# Patient Record
Sex: MALE | Race: BLACK OR AFRICAN AMERICAN | URBAN - METROPOLITAN AREA
[De-identification: names, ages, dates, MRNs, and addresses within clinical notes are randomized per-mention and may not be internally consistent; named-entity substitution may affect disease eponyms.]

---

## 2017-01-18 ENCOUNTER — OFFICE VISIT (OUTPATIENT)
Dept: INTERNAL MEDICINE CLINIC | Age: 52
End: 2017-01-18

## 2017-01-18 VITALS
DIASTOLIC BLOOD PRESSURE: 71 MMHG | SYSTOLIC BLOOD PRESSURE: 128 MMHG | WEIGHT: 176 LBS | HEIGHT: 67 IN | TEMPERATURE: 98.3 F | RESPIRATION RATE: 16 BRPM | OXYGEN SATURATION: 99 % | HEART RATE: 77 BPM | BODY MASS INDEX: 27.62 KG/M2

## 2017-01-18 DIAGNOSIS — I10 ESSENTIAL HYPERTENSION WITH GOAL BLOOD PRESSURE LESS THAN 140/90: Primary | ICD-10-CM

## 2017-01-18 DIAGNOSIS — R00.2 PALPITATIONS: ICD-10-CM

## 2017-01-18 DIAGNOSIS — Z13.1 SCREENING FOR DIABETES MELLITUS (DM): ICD-10-CM

## 2017-01-18 DIAGNOSIS — E78.2 MIXED HYPERLIPIDEMIA: ICD-10-CM

## 2017-01-18 DIAGNOSIS — N52.9 ERECTILE DYSFUNCTION, UNSPECIFIED ERECTILE DYSFUNCTION TYPE: ICD-10-CM

## 2017-01-18 RX ORDER — SILDENAFIL 50 MG/1
TABLET, FILM COATED ORAL
Qty: 4 TAB | Refills: 0 | Status: SHIPPED | OUTPATIENT
Start: 2017-01-18 | End: 2018-03-20 | Stop reason: SDUPTHER

## 2017-01-18 RX ORDER — HYDROCHLOROTHIAZIDE 12.5 MG/1
CAPSULE ORAL
Refills: 2 | COMMUNITY
Start: 2017-01-07 | End: 2017-01-18 | Stop reason: ALTCHOICE

## 2017-01-18 NOTE — PATIENT INSTRUCTIONS
Erectile Dysfunction: Care Instructions  Your Care Instructions  A man has erectile dysfunction (ED) when he routinely can't get or keep an erection that allows satisfactory sex. He may not be able to have an erection at any time. Or he may not be able to have one that is firm enough or lasts long enough to complete intercourse. ED is not the same as having trouble getting an erection now and then. That's common. It happens to most men at some time. ED can be caused by problems with the blood vessels, nerves, or hormones. It can be caused by diabetes, heart disease, and injuries. Nerve disorders, such as multiple sclerosis or Parkinson's disease, can also cause it. ED can also be caused by medicines, alcohol, and tobacco. Or it may be caused by depression, stress, grief, or relationship problems. Follow-up care is a key part of your treatment and safety. Be sure to make and go to all appointments, and call your doctor if you are having problems. It's also a good idea to know your test results and keep a list of the medicines you take. How can you care for yourself at home? Lifestyle  · Limit alcohol. Have no more than 2 drinks a day. · Do not smoke. Smoking makes it harder for the blood vessels in the penis to relax and let blood flow in. If you need help quitting, talk to your doctor about stop-smoking programs and medicines. These can increase your chances of quitting for good. · Do not use cocaine, heroin, or other illegal drugs. · Try to reduce stress. · Give yourself time to adjust to change. Changes in your job, family, relationships, home life, and other areas can cause stress. And stress can cause erection problems. Work with your partner  · Don't assume that you know what your partner likes when it comes to sex. You may be wrong. Talk about what each of you does and does not enjoy. · Make time outside of the bedroom to talk about your sex life.  If you avoid sex because you are afraid of having erection problems, your partner may worry that you are no longer interested. · If you and your partner have trouble talking about sex, see a therapist who can help you talk about it. Reading books with your partner about sexual health may also help. · Relax. Take time for more foreplay. Worrying about your erections may only make things worse. Medicines  · Tell your doctor about all the medicines that you take. ¨ Some medicines can cause erection problems. ¨ Some medicines can have dangerous interactions with medicines that are prescribed for ED, including over-the-counter medicines and herbal products. · Be safe with medicines. Take your medicines exactly as prescribed. Call your doctor if you think you are having a problem with your medicine. · Talk to your doctor about trying a medicine to help you keep an erection. This could be a medicine such as Viagra, Levitra, or Cialis. If you have a heart problem, ask your doctor if these are safe for you. Do not take these medicines if you take nitroglycerin or other nitrate medicine. When should you call for help? Call your doctor now or seek immediate medical care if:  · You have an erection that lasts longer than 3 hours. · You took an erection-enhancing medicine (such as Cialis, Levitra, or Viagra) in the past 24 hours, and you have angina symptoms, such as chest pain or pressure. Do not take nitroglycerin. · You have erection problems along with pain or difficulty with urination, fever, or pain in the lower belly. Watch closely for changes in your health, and be sure to contact your doctor if you have any problems. Where can you learn more? Go to http://manuela-avtar.info/. Enter [id number] in the search box to learn more about \"Erectile Dysfunction: Care Instructions. \"  Current as of: May 24, 2016  Content Version: 11.1  © 9707-4133 [employer].  Care instructions adapted under license by [employer] (which disclaims liability or warranty for this information). If you have questions about a medical condition or this instruction, always ask your healthcare professional. [address] any warranty or liability for your use of this information.

## 2017-01-18 NOTE — PROGRESS NOTES
Reviewed record  In preparation for visit and have obtained necessary documentation. 1. Have you been to the ER, urgent care clinic since your last visit? Hospitalized since your last visit?no  2. Have you seen or consulted any other health care providers outside of the [facility] since your last visit? Include any pap smears or colon screening. No  Patient has been given information on advanced directives at a previous visit.

## 2017-01-18 NOTE — MR AVS SNAPSHOT
Visit Information Date & Time Provider Department Dept. Phone Encounter #  
 1/18/2017  9:30 AM Sheyla Renonso [facility] [phone] [id number] Follow-up Instructions Return in about 4 months (around 5/18/2017), or if symptoms worsen or fail to improve, for HTN, hyperlipidemia. Your Appointments 6/6/2017  9:00 AM  
6 MONTH with Timur Rivas MD  
[facility] [address] [place] [address]) Appt Note: . [address] [facility]  
[phone] [address] [facility] Upcoming Health Maintenance Date Due COLONOSCOPY 6/12/2020 DTaP/Tdap/Td series (2 - Td) 10/4/2022 Allergies as of 1/18/2017  Review Complete On: 1/18/2017 By: Michelle Martinez MD  
 No Known Allergies Current Immunizations  Reviewed on 10/14/2016 Name Date Influenza Vaccine (Madin [place] Canine Kidney) PF 11/3/2015 Influenza Vaccine Arletha Tere) 10/14/2016 Influenza Vaccine (Quad) PF 9/26/2014 Influenza Vaccine PF 11/13/2013 TDAP Vaccine 10/4/2012 Not reviewed this visit You Were Diagnosed With   
  
 Codes Comments Essential hypertension with goal blood pressure less than 140/90    -  Primary ICD-10-CM: I10 
ICD-9-CM: 401.9 Palpitations     ICD-10-CM: R00.2 ICD-9-CM: 785.1 Mixed hyperlipidemia     ICD-10-CM: E78.2 ICD-9-CM: 272.2 Erectile dysfunction, unspecified erectile dysfunction type     ICD-10-CM: N52.9 ICD-9-CM: 607.84 Screening for diabetes mellitus (DM)     ICD-10-CM: Z13.1 ICD-9-CM: V77.1 Vitals BP Pulse Temp Resp Height(growth percentile) Weight(growth percentile) 128/71 (BP 1 Location: Left arm, BP Patient Position: Sitting) 77 98.3 °F (36.8 °C) (Oral) 16 5' 7\" (1.702 m) 176 lb (79.8 kg) SpO2 BMI Smoking Status 99% 27.57 kg/m2 Former Smoker BMI and BSA Data Body Mass Index Body Surface Area 27.57 kg/m 2 1.94 m 2 Preferred Pharmacy Pharmacy Name Phone [facility] Steven Burton [phone] Your Updated Medication List  
  
   
This list is accurate as of: 1/18/17  9:50 AM.  Always use your most recent med list.  
  
  
  
  
 aspirin delayed-release 81 mg tablet Take 81 mg by mouth daily. hydroCHLOROthiazide 12.5 mg tablet Commonly known as:  HYDRODIURIL Take 1 Tab by mouth daily for 30 days. ibuprofen 200 mg tablet Commonly known as:  MOTRIN Take 200 mg by mouth as needed. lisinopril 10 mg tablet Commonly known as:  PRINIVIL, ZESTRIL  
TAKE ONE TABLET DAILY FOR HYPERTENSION  
  
 metoprolol succinate 25 mg XL tablet Commonly known as:  TOPROL-XL Take 1 Tab by mouth nightly. multivitamin tablet Commonly known as:  ONE A DAY Take 1 Tab by mouth daily. We Performed the Following CBC WITH AUTOMATED DIFF [33090 CPT(R)] HEMOGLOBIN A1C WITH EAG [14406 CPT(R)] LIPID PANEL [93999 CPT(R)] METABOLIC PANEL, COMPREHENSIVE [60513 CPT(R)] PSA SCREENING (SCREENING) [ [facility]] TESTOSTERONE, TOTAL, ADULT MALE [77626 CPT(R)] TSH AND FREE T4 [76444 CPT(R)] Follow-up Instructions Return in about 4 months (around 5/18/2017), or if symptoms worsen or fail to improve, for HTN, hyperlipidemia. Patient Instructions Erectile Dysfunction: Care Instructions Your Care Instructions A man has erectile dysfunction (ED) when he routinely can't get or keep an erection that allows satisfactory sex. He may not be able to have an erection at any time. Or he may not be able to have one that is firm enough or lasts long enough to complete intercourse. ED is not the same as having trouble getting an erection now and then. That's common. It happens to most men at some time. ED can be caused by problems with the blood vessels, nerves, or hormones. It can be caused by diabetes, heart disease, and injuries. Nerve disorders, such as multiple sclerosis or Parkinson's disease, can also cause it. ED can also be caused by medicines, alcohol, and tobacco. Or it may be caused by depression, stress, grief, or relationship problems. Follow-up care is a key part of your treatment and safety. Be sure to make and go to all appointments, and call your doctor if you are having problems. It's also a good idea to know your test results and keep a list of the medicines you take. How can you care for yourself at home? Lifestyle · Limit alcohol. Have no more than 2 drinks a day. · Do not smoke. Smoking makes it harder for the blood vessels in the penis to relax and let blood flow in. If you need help quitting, talk to your doctor about stop-smoking programs and medicines. These can increase your chances of quitting for good. · Do not use cocaine, heroin, or other illegal drugs. · Try to reduce stress. · Give yourself time to adjust to change. Changes in your job, family, relationships, home life, and other areas can cause stress. And stress can cause erection problems. Work with your partner · Don't assume that you know what your partner likes when it comes to sex. You may be wrong. Talk about what each of you does and does not enjoy. · Make time outside of the bedroom to talk about your sex life. If you avoid sex because you are afraid of having erection problems, your partner may worry that you are no longer interested. · If you and your partner have trouble talking about sex, see a therapist who can help you talk about it. Reading books with your partner about sexual health may also help. · Relax. Take time for more foreplay. Worrying about your erections may only make things worse. Medicines · Tell your doctor about all the medicines that you take. ¨ Some medicines can cause erection problems. ¨ Some medicines can have dangerous interactions with medicines that are prescribed for ED, including over-the-counter medicines and herbal products. · Be safe with medicines. Take your medicines exactly as prescribed. Call your doctor if you think you are having a problem with your medicine. · Talk to your doctor about trying a medicine to help you keep an erection. This could be a medicine such as Viagra, Levitra, or Cialis. If you have a heart problem, ask your doctor if these are safe for you. Do not take these medicines if you take nitroglycerin or other nitrate medicine. When should you call for help? Call your doctor now or seek immediate medical care if: 
· You have an erection that lasts longer than 3 hours. · You took an erection-enhancing medicine (such as Cialis, Levitra, or Viagra) in the past 24 hours, and you have angina symptoms, such as chest pain or pressure. Do not take nitroglycerin. · You have erection problems along with pain or difficulty with urination, fever, or pain in the lower belly. Watch closely for changes in your health, and be sure to contact your doctor if you have any problems. Where can you learn more? Go to http://manuela-avtar.info/. Enter [id number] [phone] in the search box to learn more about \"Erectile Dysfunction: Care Instructions. \" Current as of: May 24, 2016 Content Version: 11.1 © 0490-2108 [employer]. Care instructions adapted under license by [employer] (which disclaims liability or warranty for this information). If you have questions about a medical condition or this instruction, always ask your healthcare professional. [place] [address] any warranty or liability for your use of this information. Introducing [facility]! Dear Narciso Goldman: 
Thank you for requesting a [employer] account. Our records indicate that you already have an active [employer] account.   You can access your account anytime at [email or web address] Did you know that you can access your hospital and ER discharge instructions at any time in [employer]? You can also review all of your test results from your hospital stay or ER visit. Additional Information If you have questions, please visit the Frequently Asked Questions section of the Mega[employer] website at [email or web address] Remember, MegaPath is NOT to be used for urgent needs. For medical emergencies, dial 911. Now available from your iPhone and Android! Please provide this summary of care documentation to your next provider. Your primary care clinician is listed as Yolanda Peguero. If you have any questions after today's visit, please call [phone].

## 2017-01-18 NOTE — PROGRESS NOTES
CC:  Chief Complaint   Patient presents with    Hypertension    Palpitations     follow up       [address] [facility] III is a 45 y.o. male. Presents for 3 month follow up evaluation. He has HTN, hyperlipidemia, prediabetes, allergic rhinitis, family history of prostate cancer, and family history of colon cancer.     Saw Dr. Cece Bateman about heart palpitations. Stress test and echo were normal. 24 hr Holter monitor showed episodes of sinus tachycardia that correlated with his symptoms. Was started on metoprolol and no longer has palpitations/sensations of slow or skipped beats. He does complain, however, of having ED since starting metoprolol.     Reports compliance with low-salt, low-fat diet and medications. Does not get much regular exercise. Is always under much stress at work.     Soc Hx  . Has 2 children ages 11 and 6. He works as the [employer]. Former smoker; quit in 1997 (smoked 1/2 ppd for 2 yrs). Drinks 1/2 to 1 glass of wine with dinner. Denies recreational drug use.      Health Maintenance  Flu vaccine: 10/14/16                                                            Tetanus vaccine:  Tdap 10/4/12                                    Colonoscopy: 6/12/15 (diverticulosis, grade 1 internal hemorrhoids); next due in 10 yrs (2020)  Eye exam: Fall 2015 ([facility])                ROS  Constitutional: negative for fevers, chills, night sweats  ENT:   negative for sore throat, nasal congestion, ear pains, hoarseness  Respiratory:  negative for cough, hemoptysis, dyspnea,wheezing  CV:   negative for chest pain, palpitations, lower extremity edema  GI:   negative for heartburn, abd pain, nausea, vomiting, diarrhea, constipation  Genitourinary: negative for frequency, dysuria and hematuria  Integument:  negative for rash and pruritus  Musculoskel: negative for myalgias, arthralgias, back pain, muscle weakness, joint pain  Neurological:  negative for headaches, dizziness, vertigo, gait problems  Behavl/Psych: negative for feelings of anxiety, depression, mood change     Patient Active Problem List   Diagnosis Code    Family history of colon cancer Z80.0    Family history of prostate cancer Z80.42    Palpitations R00.2    Low back pain M54.5    Essential hypertension with goal blood pressure less than 140/90 I10    Hyperlipidemia E78.5    Allergic rhinitis J30.9     Past Medical History   Diagnosis Date    Allergic rhinitis     Family history of colon cancer     Family history of prostate cancer     Heart palpitations 2011     Event monitor: occ PVC's. Echo nl. Eval by Dr. Gaby Holt Hypertension     Lumbar back pain      No Known Allergies  Current Outpatient Prescriptions   Medication Sig Dispense Refill    metoprolol succinate (TOPROL-XL) 25 mg XL tablet Take 1 Tab by mouth nightly. 90 Tab 3    lisinopril (PRINIVIL, ZESTRIL) 10 mg tablet TAKE ONE TABLET DAILY FOR HYPERTENSION 30 Tab 11    multivitamin (ONE A DAY) tablet Take 1 Tab by mouth daily.  ibuprofen (MOTRIN) 200 mg tablet Take 200 mg by mouth as needed.  aspirin delayed-release 81 mg tablet Take 81 mg by mouth daily.  hydroCHLOROthiazide (HYDRODIURIL) 12.5 mg tablet Take 1 Tab by mouth daily for 30 days. 30 Tab 2         PHYSICAL EXAM  Visit Vitals    /71 (BP 1 Location: Left arm, BP Patient Position: Sitting)    Pulse 77    Temp 98.3 °F (36.8 °C) (Oral)    Resp 16    Ht 5' 7\" (1.702 m)    Wt 176 lb (79.8 kg)    SpO2 99%    BMI 27.57 kg/m2       General: Well-developed and well-nourished, no distress. HEENT:  Head normocephalic/atraumatic, no scleral icterus  Lungs:  Clear to ausculation bilaterally. Good air movement. Heart:  Regular rate and rhythm, normal S1 and S2, no murmur, gallop, or rub  Extremities: No clubbing, cyanosis, or edema. Varicose veins at LLE. Neurological: Alert and oriented. Psychiatric: Normal mood and affect.  Behavior is normal. ASSESSMENT AND PLAN    ICD-10-CM ICD-9-CM    1. Essential hypertension with goal blood pressure less than 140/90 A93 775.6 METABOLIC PANEL, COMPREHENSIVE      CBC WITH AUTOMATED DIFF   2. Palpitations R00.2 785.1    3. Mixed hyperlipidemia E78.2 272.2 LIPID PANEL      TSH AND FREE T4   4. Erectile dysfunction, unspecified erectile dysfunction type N52.9 607.84 TESTOSTERONE, TOTAL, ADULT MALE      PSA SCREENING (SCREENING)      sildenafil citrate (VIAGRA) 50 mg tablet   5. Screening for diabetes mellitus (DM) Z13.1 V77.1 HEMOGLOBIN A1C WITH EAG       Doroteo Grimm was seen today for hypertension and palpitations. Diagnoses and all orders for this visit:    Essential hypertension with goal blood pressure less than 178/82  -     METABOLIC PANEL, COMPREHENSIVE  -     CBC WITH AUTOMATED DIFF    Palpitations, resolved    Mixed hyperlipidemia  -     LIPID PANEL  -     TSH AND FREE T4    Erectile dysfunction, unspecified erectile dysfunction type  -     TESTOSTERONE, TOTAL, ADULT MALE  -     PSA SCREENING (SCREENING)  -     Start sildenafil citrate (VIAGRA) 50 mg tablet; Take 30 mins to 1 hr before planned intercourse. Screening for diabetes mellitus (DM)  -     HEMOGLOBIN A1C WITH EAG      Follow-up Disposition:  Return in about 4 months (around 5/18/2017), or if symptoms worsen or fail to improve, for HTN, hyperlipidemia. Provided patient and/or family with advanced directive information and answered pertinent questions. Encouraged patient to provide a copy of advanced directive to the office when available. I have discussed the diagnosis with the patient and the intended plan as seen in the above orders. Patient is in agreement. The patient has received an after-visit summary and questions were answered concerning future plans. I have discussed medication side effects and warnings with the patient as well.

## 2017-01-19 LAB
ALBUMIN SERPL-MCNC: 4.7 G/DL (ref 3.5–5.5)
ALBUMIN/GLOB SERPL: 2.2 {RATIO} (ref 1.1–2.5)
ALP SERPL-CCNC: 45 IU/L (ref 39–117)
ALT SERPL-CCNC: 19 IU/L (ref 0–44)
AST SERPL-CCNC: 25 IU/L (ref 0–40)
BASOPHILS # BLD AUTO: 0 X10E3/UL (ref 0–0.2)
BASOPHILS NFR BLD AUTO: 1 %
BILIRUB SERPL-MCNC: 0.9 MG/DL (ref 0–1.2)
BUN SERPL-MCNC: 12 MG/DL (ref 6–24)
BUN/CREAT SERPL: 10 (ref 9–20)
CALCIUM SERPL-MCNC: 9.7 MG/DL (ref 8.7–10.2)
CHLORIDE SERPL-SCNC: 98 MMOL/L (ref 96–106)
CHOLEST SERPL-MCNC: 235 MG/DL (ref 100–199)
CO2 SERPL-SCNC: 24 MMOL/L (ref 18–29)
COMMENT: NORMAL
CREAT SERPL-MCNC: 1.15 MG/DL (ref 0.76–1.27)
EOSINOPHIL # BLD AUTO: 0 X10E3/UL (ref 0–0.4)
EOSINOPHIL NFR BLD AUTO: 1 %
ERYTHROCYTE [DISTWIDTH] IN BLOOD BY AUTOMATED COUNT: 14 % (ref 12.3–15.4)
EST. AVERAGE GLUCOSE BLD GHB EST-MCNC: 111 MG/DL
GLOBULIN SER CALC-MCNC: 2.1 G/DL (ref 1.5–4.5)
GLUCOSE SERPL-MCNC: 91 MG/DL (ref 65–99)
HBA1C MFR BLD: 5.5 % (ref 4.8–5.6)
HCT VFR BLD AUTO: 38.7 % (ref 37.5–51)
HDLC SERPL-MCNC: 116 MG/DL
HGB BLD-MCNC: 13 G/DL (ref 12.6–17.7)
IMM GRANULOCYTES # BLD: 0 X10E3/UL (ref 0–0.1)
IMM GRANULOCYTES NFR BLD: 0 %
INTERPRETATION, 910389: NORMAL
LDLC SERPL CALC-MCNC: 110 MG/DL (ref 0–99)
LYMPHOCYTES # BLD AUTO: 1.5 X10E3/UL (ref 0.7–3.1)
LYMPHOCYTES NFR BLD AUTO: 36 %
MCH RBC QN AUTO: 26.6 PG (ref 26.6–33)
MCHC RBC AUTO-ENTMCNC: 33.6 G/DL (ref 31.5–35.7)
MCV RBC AUTO: 79 FL (ref 79–97)
MONOCYTES # BLD AUTO: 0.5 X10E3/UL (ref 0.1–0.9)
MONOCYTES NFR BLD AUTO: 11 %
NEUTROPHILS # BLD AUTO: 2.1 X10E3/UL (ref 1.4–7)
NEUTROPHILS NFR BLD AUTO: 51 %
PLATELET # BLD AUTO: 324 X10E3/UL (ref 150–379)
POTASSIUM SERPL-SCNC: 4.3 MMOL/L (ref 3.5–5.2)
PROT SERPL-MCNC: 6.8 G/DL (ref 6–8.5)
PSA SERPL-MCNC: 0.9 NG/ML (ref 0–4)
RBC # BLD AUTO: 4.89 X10E6/UL (ref 4.14–5.8)
SODIUM SERPL-SCNC: 139 MMOL/L (ref 134–144)
T4 FREE SERPL-MCNC: 1.43 NG/DL (ref 0.82–1.77)
TESTOST SERPL-MCNC: 493 NG/DL (ref 348–1197)
TRIGL SERPL-MCNC: 44 MG/DL (ref 0–149)
TSH SERPL DL<=0.005 MIU/L-ACNC: 1.02 UIU/ML (ref 0.45–4.5)
VLDLC SERPL CALC-MCNC: 9 MG/DL (ref 5–40)
WBC # BLD AUTO: 4.2 X10E3/UL (ref 3.4–10.8)

## 2017-01-20 ENCOUNTER — TELEPHONE (OUTPATIENT)
Dept: INTERNAL MEDICINE CLINIC | Age: 52
End: 2017-01-20

## 2017-01-20 DIAGNOSIS — E78.2 MIXED HYPERLIPIDEMIA: Primary | ICD-10-CM

## 2017-01-20 RX ORDER — ATORVASTATIN CALCIUM 10 MG/1
10 TABLET, FILM COATED ORAL DAILY
Qty: 30 TAB | Refills: 5 | Status: SHIPPED | OUTPATIENT
Start: 2017-01-20 | End: 2017-08-11 | Stop reason: SDUPTHER

## 2017-01-20 NOTE — PROGRESS NOTES
Inform patient that his labs showed normal kidney and liver tests, blood counts, diabetes screening test, thyroid tests, testosterone level, and PSA, or prostate blood test. His cholesterol was high at 235. It should be less than 200. It has been high over the past 2 years. Dr. Georgette Acevedo wants to start him on cholesterol-lowering medication. It will be called in to his pharmacy if he is agreeable on starting it.

## 2017-01-20 NOTE — PROGRESS NOTES
Spoke with patient and after verifying name and date of birth of patient gave him test results per Dr. Diana Naidu. Patient stated understanding. Patient is agreeable to starting cholesterol medications.

## 2017-02-08 RX ORDER — HYDROCHLOROTHIAZIDE 12.5 MG/1
CAPSULE ORAL
Qty: 30 CAP | Refills: 2 | Status: SHIPPED | OUTPATIENT
Start: 2017-02-08 | End: 2018-03-01 | Stop reason: SDUPTHER

## 2017-02-09 RX ORDER — HYDROCHLOROTHIAZIDE 12.5 MG/1
12.5 CAPSULE ORAL DAILY
Qty: 30 CAP | Refills: 2 | OUTPATIENT
Start: 2017-02-09

## 2017-05-16 ENCOUNTER — OFFICE VISIT (OUTPATIENT)
Dept: INTERNAL MEDICINE CLINIC | Age: 52
End: 2017-05-16

## 2017-05-16 VITALS
DIASTOLIC BLOOD PRESSURE: 74 MMHG | TEMPERATURE: 98 F | WEIGHT: 170 LBS | OXYGEN SATURATION: 99 % | HEIGHT: 67 IN | SYSTOLIC BLOOD PRESSURE: 126 MMHG | BODY MASS INDEX: 26.68 KG/M2 | HEART RATE: 68 BPM | RESPIRATION RATE: 16 BRPM

## 2017-05-16 DIAGNOSIS — N50.82 SCROTAL PAIN: Primary | ICD-10-CM

## 2017-05-16 DIAGNOSIS — E78.2 MIXED HYPERLIPIDEMIA: ICD-10-CM

## 2017-05-16 DIAGNOSIS — I10 ESSENTIAL HYPERTENSION WITH GOAL BLOOD PRESSURE LESS THAN 140/90: ICD-10-CM

## 2017-05-16 NOTE — PATIENT INSTRUCTIONS
Learning About High Cholesterol  What is high cholesterol? Cholesterol is a type of fat in your blood. It is needed for many body functions, such as making new cells. Cholesterol is made by your body. It also comes from food you eat. If you have too much cholesterol, it starts to build up in your arteries. This is called hardening of the arteries, or atherosclerosis. High cholesterol raises your risk of a heart attack and stroke. There are different types of cholesterol. LDL is the \"bad\" cholesterol. High LDL can raise your risk for heart disease, heart attack, and stroke. HDL is the \"good\" cholesterol. High HDL is linked with a lower risk for heart disease, heart attack, and stroke. Your cholesterol levels help your doctor find out your risk for having a heart attack or stroke. How can you prevent high cholesterol? A heart-healthy lifestyle can help you prevent high cholesterol. This lifestyle helps lower your risk for a heart attack and stroke. · Eat heart-healthy foods. ¨ Eat fruits, vegetables, whole grains (like oatmeal), dried beans and peas, nuts and seeds, soy products (like tofu), and fat-free or low-fat dairy products. ¨ Replace butter, margarine, and hydrogenated or partially hydrogenated oils with olive and canola oils. (Canola oil margarine without trans fat is fine.)  ¨ Replace red meat with fish, poultry, and soy protein (like tofu). ¨ Limit processed and packaged foods like chips, crackers, and cookies. · Be active. Exercise can improve your cholesterol level. Get at least 30 minutes of exercise on most days of the week. Walking is a good choice. You also may want to do other activities, such as running, swimming, cycling, or playing tennis or team sports. · Stay at a healthy weight. Lose weight if you need to. · Don't smoke. If you need help quitting, talk to your doctor about stop-smoking programs and medicines. These can increase your chances of quitting for good.   How is high cholesterol treated? The goal of treatment is to reduce your chances of having a heart attack or stroke. The goal is not to lower your cholesterol numbers only. · You may make lifestyle changes, such as eating healthy foods, not smoking, losing weight, and being more active. · You may have to take medicine. Follow-up care is a key part of your treatment and safety. Be sure to make and go to all appointments, and call your doctor if you are having problems. It's also a good idea to know your test results and keep a list of the medicines you take. Where can you learn more? Go to http://manuela-avtar.info/. Enter U031 in the search box to learn more about \"Learning About High Cholesterol. \"  Current as of: January 27, 2016  Content Version: 11.2  © 4934-0548 [employer]. Care instructions adapted under license by [employer] (which disclaims liability or warranty for this information). If you have questions about a medical condition or this instruction, always ask your healthcare professional. [place] [address] any warranty or liability for your use of this information.

## 2017-05-16 NOTE — PROGRESS NOTES
CC:  Chief Complaint   Patient presents with    Hypertension    Cholesterol Problem    Groin Pain     HISTORY OF PRESENT ILLNESS  Avi Liang III is a 45 y.o. male. Presents for 3 month follow up evaluation. He has HTN, hyperlipidemia, prediabetes, heart palpitations, allergic rhinitis, family history of prostate cancer, and family history of colon cancer. Today he complains of right-sided groin pain for the past 3 weeks. Noticed a lump at the right scrotal area that comes and goes and is associated with pain. Worse after he plays basketball. Had same thing a couple of years; scrotal US returned normal, no definitive diagnosis made, pain and swelling resolved on its own. Reports compliance with low-salt, low-fat diet and medications. Does not get much regular exercise. Saw Dr. Andrey Fatima in 10/16 about heart palpitations. Stress test and echo were normal. 24 hr Holter monitor showed episodes of sinus tachycardia that correlated with his symptoms. Was started on metoprolol. Heart palpitations less frequent and less severe now. Soc Hx  . Has 2 children ages 11 and 6. He works as the [employer]. Job is stressful. Former smoker; quit in 1997 (smoked 1/2 ppd for 2 yrs). Drinks 1/2 to 1 glass of wine with dinner. Denies recreational drug use. Gets occasional exercise by playing basket      Health Maintenance  Flu vaccine: 10/14/16                                                            Tetanus vaccine:  Tdap 10/4/12                                    Colonoscopy: 6/12/15 (diverticulosis, grade 1 internal hemorrhoids); next due in 10 yrs (2020)  Eye exam: Fall 2015 ([facility])                 ROS  Constitutional: negative for fevers, chills, night sweats  ENT:   negative for sore throat, nasal congestion, ear pains  Respiratory:  negative for cough, dyspnea, wheezing  CV:   negative for chest pain, palpitations, lower extremity edema  GI:   negative for heartburn, abd pain, nausea, vomiting, diarrhea, constipation  Genitourinary: negative for frequency, dysuria and hematuria  Integument:  negative for rash and pruritus  Musculoskel: negative for myalgias, arthralgias, back pain, muscle weakness, joint pain  Neurological:  negative for headaches, dizziness, vertigo, gait problems  Behavl/Psych: negative for feelings of anxiety, depression, mood change     Patient Active Problem List   Diagnosis Code    Family history of colon cancer Z80.0    Family history of prostate cancer Z80.42    Palpitations R00.2    Low back pain M54.5    Essential hypertension with goal blood pressure less than 140/90 I10    Hyperlipidemia E78.5    Allergic rhinitis J30.9     Past Medical History:   Diagnosis Date    Allergic rhinitis     Family history of colon cancer     Family history of prostate cancer     Heart palpitations 2011    Event monitor: occ PVC's. Echo nl. Eval by Dr. Dean Mcdonnell Hypertension     Lumbar back pain      No Known Allergies  Current Outpatient Prescriptions   Medication Sig Dispense Refill    hydroCHLOROthiazide (MICROZIDE) 12.5 mg capsule TAKE ONE TABLET DAILY FOR 30 DAYS-NODE DOSE DECREASE 30 Cap 2    atorvastatin (LIPITOR) 10 mg tablet Take 1 Tab by mouth daily. 30 Tab 5    sildenafil citrate (VIAGRA) 50 mg tablet Take 30 mins to 1 hr before planned intercourse. 4 Tab 0    metoprolol succinate (TOPROL-XL) 25 mg XL tablet Take 1 Tab by mouth nightly. 90 Tab 3    lisinopril (PRINIVIL, ZESTRIL) 10 mg tablet TAKE ONE TABLET DAILY FOR HYPERTENSION 30 Tab 11    multivitamin (ONE A DAY) tablet Take 1 Tab by mouth daily.  ibuprofen (MOTRIN) 200 mg tablet Take 200 mg by mouth as needed.  aspirin delayed-release 81 mg tablet Take 81 mg by mouth daily.  hydroCHLOROthiazide (HYDRODIURIL) 12.5 mg tablet Take 1 Tab by mouth daily for 30 days.  30 Tab 2         PHYSICAL EXAM  Visit Vitals    /74 (BP 1 Location: Left arm, BP Patient Position: Sitting)    Pulse 68    Temp 98 °F (36.7 °C) (Oral)    Resp 16    Ht 5' 7\" (1.702 m)    Wt 170 lb (77.1 kg)    SpO2 99%    BMI 26.63 kg/m2       General: Well-developed and well-nourished, no distress. HEENT:  Head normocephalic/atraumatic, no scleral icterus  Lungs:  Clear to ausculation bilaterally. Good air movement. Heart:  Regular rate and rhythm, normal S1 and S2, no murmur, gallop, or rub  : Normal bilateral descended testes. No palpable scrotal mass. No hernias. No penile lesions or discharge. Extremities: No clubbing, cyanosis, or edema. Neurological: Alert and oriented. Psychiatric: Normal mood and affect. Behavior is normal.     Results for orders placed or performed in visit on 01/18/17   LIPID PANEL   Result Value Ref Range    Cholesterol, total 235 (H) 100 - 199 mg/dL    Triglyceride 44 0 - 149 mg/dL    HDL Cholesterol 116 >39 mg/dL    VLDL, calculated 9 5 - 40 mg/dL    LDL, calculated 110 (H) 0 - 99 mg/dL   METABOLIC PANEL, COMPREHENSIVE   Result Value Ref Range    Glucose 91 65 - 99 mg/dL    BUN 12 6 - 24 mg/dL    Creatinine 1.15 0.76 - 1.27 mg/dL    GFR est non-AA 73 >59 mL/min/1.73    GFR est AA 84 >59 mL/min/1.73    BUN/Creatinine ratio 10 9 - 20    Sodium 139 134 - 144 mmol/L    Potassium 4.3 3.5 - 5.2 mmol/L    Chloride 98 96 - 106 mmol/L    CO2 24 18 - 29 mmol/L    Calcium 9.7 8.7 - 10.2 mg/dL    Protein, total 6.8 6.0 - 8.5 g/dL    Albumin 4.7 3.5 - 5.5 g/dL    GLOBULIN, TOTAL 2.1 1.5 - 4.5 g/dL    A-G Ratio 2.2 1.1 - 2.5    Bilirubin, total 0.9 0.0 - 1.2 mg/dL    Alk.  phosphatase 45 39 - 117 IU/L    AST (SGOT) 25 0 - 40 IU/L    ALT (SGPT) 19 0 - 44 IU/L   CBC WITH AUTOMATED DIFF   Result Value Ref Range    WBC 4.2 3.4 - 10.8 x10E3/uL    RBC 4.89 4.14 - 5.80 x10E6/uL    HGB 13.0 12.6 - 17.7 g/dL    HCT 38.7 37.5 - 51.0 %    MCV 79 79 - 97 fL    MCH 26.6 26.6 - 33.0 pg    MCHC 33.6 31.5 - 35.7 g/dL    RDW 14.0 12.3 - 15.4 %    PLATELET 811 234 - 929 x10E3/uL    NEUTROPHILS 51 %    Lymphocytes 36 %    MONOCYTES 11 %    EOSINOPHILS 1 %    BASOPHILS 1 %    ABS. NEUTROPHILS 2.1 1.4 - 7.0 x10E3/uL    Abs Lymphocytes 1.5 0.7 - 3.1 x10E3/uL    ABS. MONOCYTES 0.5 0.1 - 0.9 x10E3/uL    ABS. EOSINOPHILS 0.0 0.0 - 0.4 x10E3/uL    ABS. BASOPHILS 0.0 0.0 - 0.2 x10E3/uL    IMMATURE GRANULOCYTES 0 %    ABS. IMM. GRANS. 0.0 0.0 - 0.1 x10E3/uL   HEMOGLOBIN A1C WITH EAG   Result Value Ref Range    Hemoglobin A1c 5.5 4.8 - 5.6 %    Estimated average glucose 111 mg/dL   TSH AND FREE T4   Result Value Ref Range    TSH 1.020 0.450 - 4.500 uIU/mL    T4, Free 1.43 0.82 - 1.77 ng/dL   TESTOSTERONE, TOTAL, ADULT MALE   Result Value Ref Range    Testosterone 493 348 - 1197 ng/dL    COMMENT Comment    PSA SCREENING (SCREENING)   Result Value Ref Range    Prostate Specific Ag 0.9 0.0 - 4.0 ng/mL   CVD REPORT   Result Value Ref Range    INTERPRETATION Note          ASSESSMENT AND PLAN    ICD-10-CM ICD-9-CM    1. Scrotal pain N50.82 608.9 US SCROTUM/TESTICLES   2. Mixed hyperlipidemia E78.2 272.2 LIPID PANEL   3. Essential hypertension with goal blood pressure less than 140/90 I10 401.9        August Karolina was seen today for hypertension, cholesterol problem and groin pain. Diagnoses and all orders for this visit:    Scrotal pain, right-sided  He has noticed swelling along right testicle. Most likely secondary to non-infectious epididymitis, which can be precipitated by prolonged periods of sitting (as with sedentary desk jobs or long plane rides) or vigorous exercise. -     US SCROTUM/TESTICLES; Future    Mixed hyperlipidemia  -     LIPID PANEL    Essential hypertension with goal blood pressure less than 140/90  Well-controlled. Continue HCTZ and metoprolol. Follow-up Disposition:  Return in about 4 months (around 9/16/2017), or if symptoms worsen or fail to improve, for HTN, hyperlipidemia. Provided patient and/or family with advanced directive information and answered pertinent questions.   Encouraged patient to provide a copy of advanced directive to the office when available. I have discussed the diagnosis with the patient and the intended plan as seen in the above orders. Patient is in agreement. The patient has received an after-visit summary and questions were answered concerning future plans. I have discussed medication side effects and warnings with the patient as well.

## 2017-05-16 NOTE — LETTER
5/23/2017 11:17 AM 
 
Mr. Angela Castañeda III 
[address] 
[address] [facility] [address] Dear Angela Castañeda III: 
 
Please find your most recent results below. Resulted Orders LIPID PANEL Result Value Ref Range Cholesterol, total 196 100 - 199 mg/dL Triglyceride 42 0 - 149 mg/dL HDL Cholesterol 124 >39 mg/dL VLDL, calculated 8 5 - 40 mg/dL LDL, calculated 64 0 - 99 mg/dL RECOMMENDATIONS: 
 
Your cholesterol level returned normal. Total cholesterol was 196. Normal is less than 200. Was 235 last month. Continue taking atorvastatin. Please call me if you have any questions: [phone] Sincerely, Renetta oRdriguez LPN

## 2017-05-16 NOTE — MR AVS SNAPSHOT
Visit Information Date & Time Provider Department Dept. Phone Encounter #  
 5/16/2017  9:15 AM Eva Cleary MD Tahmina Fu [phone] [id number] Follow-up Instructions Return in about 4 months (around 9/16/2017), or if symptoms worsen or fail to improve, for HTN, hyperlipidemia. Your Appointments 6/6/2017  9:00 AM  
6 MONTH with Efrain Kayser, MD  
[facility] Appt Note: . [id number] [facility] [address]  
[phone] [id number] [facility] [address] Upcoming Health Maintenance Date Due INFLUENZA AGE 9 TO ADULT 8/1/2017 COLONOSCOPY 6/12/2020 DTaP/Tdap/Td series (2 - Td) 10/4/2022 Allergies as of 5/16/2017  Review Complete On: 5/16/2017 By: Eva Cleary MD  
 No Known Allergies Current Immunizations  Reviewed on 10/14/2016 Name Date Influenza Vaccine (Madin Erna Canine Kidney) PF 11/3/2015 Influenza Vaccine Evins Sluder) 10/14/2016 Influenza Vaccine (Quad) PF 9/26/2014 Influenza Vaccine PF 11/13/2013 TDAP Vaccine 10/4/2012 Not reviewed this visit You Were Diagnosed With   
  
 Codes Comments Scrotal pain    -  Primary ICD-10-CM: Q59.20 ICD-9-CM: 608.9 Mixed hyperlipidemia     ICD-10-CM: E78.2 ICD-9-CM: 272.2 Essential hypertension with goal blood pressure less than 140/90     ICD-10-CM: I10 
ICD-9-CM: 401.9 Vitals BP Pulse Temp Resp Height(growth percentile) Weight(growth percentile) 126/74 (BP 1 Location: Left arm, BP Patient Position: Sitting) 68 98 °F (36.7 °C) (Oral) 16 5' 7\" (1.702 m) 170 lb (77.1 kg) SpO2 BMI Smoking Status 99% 26.63 kg/m2 Former Smoker Vitals History BMI and BSA Data Body Mass Index Body Surface Area  
 26.63 kg/m 2 1.91 m 2 Preferred Pharmacy Pharmacy Name Phone [facility] [address] [phone] Your Updated Medication List  
  
   
This list is accurate as of: 5/16/17 10:10 AM.  Always use your most recent med list.  
  
  
  
  
 aspirin delayed-release 81 mg tablet Take 81 mg by mouth daily. atorvastatin 10 mg tablet Commonly known as:  LIPITOR Take 1 Tab by mouth daily. * hydroCHLOROthiazide 12.5 mg tablet Commonly known as:  HYDRODIURIL Take 1 Tab by mouth daily for 30 days. * hydroCHLOROthiazide 12.5 mg capsule Commonly known as:  Ada Nian TAKE ONE TABLET DAILY FOR 30 DAYS-NODE DOSE DECREASE  
  
 ibuprofen 200 mg tablet Commonly known as:  MOTRIN Take 200 mg by mouth as needed. lisinopril 10 mg tablet Commonly known as:  PRINIVIL, ZESTRIL  
TAKE ONE TABLET DAILY FOR HYPERTENSION  
  
 metoprolol succinate 25 mg XL tablet Commonly known as:  TOPROL-XL Take 1 Tab by mouth nightly. multivitamin tablet Commonly known as:  ONE A DAY Take 1 Tab by mouth daily. sildenafil citrate 50 mg tablet Commonly known as:  VIAGRA Take 30 mins to 1 hr before planned intercourse. * Notice: This list has 2 medication(s) that are the same as other medications prescribed for you. Read the directions carefully, and ask your doctor or other care provider to review them with you. We Performed the Following LIPID PANEL [93706 CPT(R)] Follow-up Instructions Return in about 4 months (around 9/16/2017), or if symptoms worsen or fail to improve, for HTN, hyperlipidemia. To-Do List   
 05/17/2017 Imaging:  US SCROTUM/TESTICLES Patient Instructions Learning About High Cholesterol What is high cholesterol? Cholesterol is a type of fat in your blood. It is needed for many body functions, such as making new cells. Cholesterol is made by your body. It also comes from food you eat. If you have too much cholesterol, it starts to build up in your arteries. This is called hardening of the arteries, or atherosclerosis. High cholesterol raises your risk of a heart attack and stroke. There are different types of cholesterol. LDL is the \"bad\" cholesterol. High LDL can raise your risk for heart disease, heart attack, and stroke. HDL is the \"good\" cholesterol. High HDL is linked with a lower risk for heart disease, heart attack, and stroke. Your cholesterol levels help your doctor find out your risk for having a heart attack or stroke. How can you prevent high cholesterol? A heart-healthy lifestyle can help you prevent high cholesterol. This lifestyle helps lower your risk for a heart attack and stroke. · Eat heart-healthy foods. ¨ Eat fruits, vegetables, whole grains (like oatmeal), dried beans and peas, nuts and seeds, soy products (like tofu), and fat-free or low-fat dairy products. ¨ Replace butter, margarine, and hydrogenated or partially hydrogenated oils with olive and canola oils. (Canola oil margarine without trans fat is fine.) ¨ Replace red meat with fish, poultry, and soy protein (like tofu). ¨ Limit processed and packaged foods like chips, crackers, and cookies. · Be active. Exercise can improve your cholesterol level. Get at least 30 minutes of exercise on most days of the week. Walking is a good choice. You also may want to do other activities, such as running, swimming, cycling, or playing tennis or team sports. · Stay at a healthy weight. Lose weight if you need to. · Don't smoke. If you need help quitting, talk to your doctor about stop-smoking programs and medicines. These can increase your chances of quitting for good. How is high cholesterol treated? The goal of treatment is to reduce your chances of having a heart attack or stroke. The goal is not to lower your cholesterol numbers only.  
· You may make lifestyle changes, such as eating healthy foods, not smoking, losing weight, and being more active. · You may have to take medicine. Follow-up care is a key part of your treatment and safety. Be sure to make and go to all appointments, and call your doctor if you are having problems. It's also a good idea to know your test results and keep a list of the medicines you take. Where can you learn more? Go to http://manuela-avtar.info/. Enter I110 in the search box to learn more about \"Learning About High Cholesterol. \" Current as of: January 27, 2016 Content Version: 11.2 © 2713-4986 [employer]. Care instructions adapted under license by [employer] (which disclaims liability or warranty for this information). If you have questions about a medical condition or this instruction, always ask your healthcare professional. [address] any warranty or liability for your use of this information. Introducing [facility]! Dear Kimi Knight: 
Thank you for requesting a blinkbox music account. Our records indicate that you already have an active blinkbox music account. You can access your account anytime at [email or web address] Did you know that you can access your hospital and ER discharge instructions at any time in blinkbox music? You can also review all of your test results from your hospital stay or ER visit. Additional Information If you have questions, please visit the Frequently Asked Questions section of the [employer]box music website at [email or web address] Remember, blinkbox music is NOT to be used for urgent needs. For medical emergencies, dial 911. Now available from your iPhone and Android! Please provide this summary of care documentation to your next provider. Your primary care clinician is listed as Elías Tavera. If you have any questions after today's visit, please call [phone].

## 2017-05-16 NOTE — PROGRESS NOTES
Reviewed record  In preparation for visit and have obtained necessary documentation. 1. Have you been to the ER, urgent care clinic since your last visit? Hospitalized since your last visit?no  2. Have you seen or consulted any other health care providers outside of the [address] [place] [address] since your last visit? Include any pap smears or colon screening. No  Patient does not currently have advance directives. Patient given information on advance directives and brochure and printed blank advance directive form made available to patient. Patient is also asked to make copy of directives available to this office for our/[facility] records once advanced directives are completed.

## 2017-05-17 LAB
CHOLEST SERPL-MCNC: 196 MG/DL (ref 100–199)
HDLC SERPL-MCNC: 124 MG/DL
INTERPRETATION, 910389: NORMAL
LDLC SERPL CALC-MCNC: 64 MG/DL (ref 0–99)
TRIGL SERPL-MCNC: 42 MG/DL (ref 0–149)
VLDLC SERPL CALC-MCNC: 8 MG/DL (ref 5–40)

## 2017-05-17 NOTE — PROGRESS NOTES
Your cholesterol level returned normal. Total cholesterol was 196. Normal is less than 200. Was 235 last month. Continue taking atorvastatin.

## 2017-06-21 ENCOUNTER — HOSPITAL ENCOUNTER (OUTPATIENT)
Dept: ULTRASOUND IMAGING | Age: 52
Discharge: HOME | End: 2017-06-21
Attending: INTERNAL MEDICINE
Payer: COMMERCIAL

## 2017-06-21 DIAGNOSIS — N50.82 SCROTAL PAIN: ICD-10-CM

## 2017-06-21 PROCEDURE — 76870 US EXAM SCROTUM: CPT

## 2017-06-27 NOTE — PROGRESS NOTES
Your testicular ultrasound returned normal. Dr. Yosef Salcedo would like you to schedule an appointment with her within the next 30 days to discuss the results further.

## 2017-07-19 ENCOUNTER — OFFICE VISIT (OUTPATIENT)
Dept: CARDIOLOGY CLINIC | Age: 52
End: 2017-07-19

## 2017-07-19 VITALS
DIASTOLIC BLOOD PRESSURE: 90 MMHG | WEIGHT: 173.8 LBS | RESPIRATION RATE: 16 BRPM | HEIGHT: 67 IN | OXYGEN SATURATION: 99 % | BODY MASS INDEX: 27.28 KG/M2 | HEART RATE: 68 BPM | SYSTOLIC BLOOD PRESSURE: 132 MMHG

## 2017-07-19 DIAGNOSIS — I10 ESSENTIAL HYPERTENSION WITH GOAL BLOOD PRESSURE LESS THAN 140/90: ICD-10-CM

## 2017-07-19 DIAGNOSIS — R00.2 PALPITATIONS: Primary | ICD-10-CM

## 2017-07-19 DIAGNOSIS — E78.2 MIXED HYPERLIPIDEMIA: ICD-10-CM

## 2017-07-19 NOTE — PROGRESS NOTES
Subjective/HPI:     Caroline Holland III is a 45 y.o. male is here for f/u appt. He reports that he continues to have rare palpitations on the increased dose of Metoprolol. Doing much better than he was on the low dose. He continues to have some discomfort in his left calf from varicose veins. The patient denies chest pain/ shortness of breath, orthopnea, PND, LE edema, syncope, presyncope or fatigue. PCP Provider  Ariel Jim MD  Past Medical History:   Diagnosis Date    Allergic rhinitis     Family history of colon cancer     Family history of prostate cancer     Heart palpitations 2011    Event monitor: occ PVC's. Echo nl. Eval by Dr. Korey Díaz Hypertension     Lumbar back pain       Past Surgical History:   Procedure Laterality Date    HX COLONOSCOPY  2008    Dr Genevieve Gallegos HX COLONOSCOPY  6/12/15    Dr. Rukhsana Park; diverticulosis, int. hemorrhoids     No Known Allergies   Family History   Problem Relation Age of Onset    Cancer Mother      rectal ca    Diabetes Mother     Other Mother      thyroid issues/unknown type    Cancer Sister       Current Outpatient Prescriptions   Medication Sig    hydroCHLOROthiazide (MICROZIDE) 12.5 mg capsule TAKE ONE TABLET DAILY FOR 30 DAYS-NODE DOSE DECREASE    atorvastatin (LIPITOR) 10 mg tablet Take 1 Tab by mouth daily.  sildenafil citrate (VIAGRA) 50 mg tablet Take 30 mins to 1 hr before planned intercourse.  metoprolol succinate (TOPROL-XL) 25 mg XL tablet Take 1 Tab by mouth nightly.  lisinopril (PRINIVIL, ZESTRIL) 10 mg tablet TAKE ONE TABLET DAILY FOR HYPERTENSION    multivitamin (ONE A DAY) tablet Take 1 Tab by mouth daily.  ibuprofen (MOTRIN) 200 mg tablet Take 200 mg by mouth as needed.  aspirin delayed-release 81 mg tablet Take 81 mg by mouth daily.  hydroCHLOROthiazide (HYDRODIURIL) 12.5 mg tablet Take 1 Tab by mouth daily for 30 days. No current facility-administered medications for this visit.        Vitals: 07/19/17 0921 07/19/17 0928   BP: 138/90 132/90   Pulse: 68    Resp: 16    SpO2: 99%    Weight: 173 lb 12.8 oz (78.8 kg)    Height: 5' 7\" (1.702 m)      Social History     Social History    Marital status:      Spouse name: N/A    Number of children: N/A    Years of education: N/A     Occupational History    Not on file. Social History Main Topics    Smoking status: Former Smoker     Packs/day: 0.50     Years: 2.00     Types: Cigarettes     Quit date: 1/3/1997    Smokeless tobacco: Former User     Types: Chew     Quit date: 1/3/1996    Alcohol use 3.5 oz/week     7 Standard drinks or equivalent per week    Drug use: No    Sexual activity: Not on file     Other Topics Concern    Not on file     Social History Narrative       I have reviewed the nurses notes, vitals, problem list, allergy list, medical history, family, social history and medications. Review of Symptoms:    General: Pt denies excessive weight gain or loss. Pt is able to conduct ADL's  HEENT: Denies blurred vision, headaches, epistaxis and difficulty swallowing. Respiratory: Denies shortness of breath, [place], wheezing or stridor. Cardiovascular: Denies precordial pain, denies worsening palpitations, edema or PND. +varicose veins left leg  Gastrointestinal: Denies poor appetite, indigestion, abdominal pain or blood in stool  Urinary: Denies dysuria, pyuria  Musculoskeletal: Denies pain or swelling from muscles or joints  Neurologic: Denies tremor, paresthesias, or sensory motor disturbance  Skin: Denies rash, itching or texture change. Psych: Denies depression        Physical Exam:      General: Well developed, in no acute distress, cooperative and alert  HEENT: No carotid bruits, no JVD, trach is midline. Neck Supple, PEERL, EOM intact. Heart:  Normal S1/S2 negative S3 or S4.  Regular, no murmur, gallop or rub.   Respiratory: Clear bilaterally x 4, no wheezing or rales  Abdomen:   Soft, non-tender, no masses, bowel sounds are active.   Extremities:  No edema, normal cap refill, no cyanosis, atraumatic. Neuro: A&Ox3, speech clear, gait stable. Skin: Skin color is normal. No rashes or lesions. Non diaphoretic  Vascular: 2+ pulses symmetric in all extremities. Left mid calf with varicosities noted    Cardiographics    ECG: SR, HR 71    Results for orders placed or performed in visit on 10/17/16   CARDIAC HOLTER MONITOR, 24 HOURS    Narrative    ECG Monitor/24 hours, Complete    Reason for Holter Monitor   BRADYCARDIA    Heartbeat    Slowest 43  Average 70  Fastest  117          Results:   Underlying Rhythm: Normal sinus rhythm      Atrial Arrhythmias: premature atrial contractions; rare            AV Conduction: normal    Ventricular Arrhythmias: premature ventricular contractions; rare     ST Segment Analysis:non-specific changes     Symptom Correlation:  Sinus tachycardia    Comment:   Sinus tachycardia that correlates with patient symptoms.      Sangita Arechiga MD           Results for orders placed or performed during the hospital encounter of 09/07/16   EKG, 12 LEAD, INITIAL   Result Value Ref Range    Ventricular Rate 79 BPM    Atrial Rate 79 BPM    P-R Interval 152 ms    QRS Duration 88 ms    Q-T Interval 380 ms    QTC Calculation (Bezet) 435 ms    Calculated P Axis 22 degrees    Calculated R Axis 19 degrees    Calculated T Axis 25 degrees    Diagnosis       Normal sinus rhythm  No previous ECGs available  Confirmed by Tony Carlos ([id number]) on 9/7/2016 4:29:21 PM     Results for orders placed or performed in visit on 03/25/11   AMB POC EKG ROUTINE W/ 12 LEADS, INTER & REP    Narrative    NSR, intervals wnl, axis wnl - no change compared with 1/2011         Cardiology Labs:  Lab Results   Component Value Date/Time    Cholesterol, total 196 05/16/2017 10:22 AM    HDL Cholesterol 124 05/16/2017 10:22 AM    LDL, calculated 64 05/16/2017 10:22 AM    Triglyceride 42 05/16/2017 10:22 AM       Lab Results   Component Value Date/Time    Sodium 139 01/18/2017 09:54 AM    Potassium 4.3 01/18/2017 09:54 AM    Chloride 98 01/18/2017 09:54 AM    CO2 24 01/18/2017 09:54 AM    Anion gap 10 09/07/2016 03:45 PM    Glucose 91 01/18/2017 09:54 AM    BUN 12 01/18/2017 09:54 AM    Creatinine 1.15 01/18/2017 09:54 AM    BUN/Creatinine ratio 10 01/18/2017 09:54 AM    GFR est AA 84 01/18/2017 09:54 AM    GFR est non-AA 73 01/18/2017 09:54 AM    Calcium 9.7 01/18/2017 09:54 AM    AST (SGOT) 25 01/18/2017 09:54 AM    Alk. phosphatase 45 01/18/2017 09:54 AM    Protein, total 6.8 01/18/2017 09:54 AM    Albumin 4.7 01/18/2017 09:54 AM    Globulin 3.4 09/07/2016 03:45 PM    A-G Ratio 2.2 01/18/2017 09:54 AM    ALT (SGPT) 19 01/18/2017 09:54 AM           Assessment:     Assessment:     Sky KNIGHT was seen today for other. Diagnoses and all orders for this visit:    Palpitations    Essential hypertension with goal blood pressure less than 140/90  -     AMB POC EKG ROUTINE W/ 12 LEADS, INTER & REP    Mixed hyperlipidemia        ICD-10-CM ICD-9-CM    1. Palpitations R00.2 785.1    2. Essential hypertension with goal blood pressure less than 140/90 I10 401.9 AMB POC EKG ROUTINE W/ 12 LEADS, INTER & REP   3. Mixed hyperlipidemia E78.2 272.2      Orders Placed This Encounter    AMB POC EKG ROUTINE W/ 12 LEADS, INTER & REP     Order Specific Question:   Reason for Exam:     Answer:   routine        Plan:     Palpitations- Patient presents today for f/u appt, reports continued improvement in his palpitations on Metoprolol 25mg. EKG remains SR. Continue current care and f/u in 1 year. HTN- well controlled    Hyperlipidemia- lipids and labs managed by PCP          Beth Sweeney NP      [facility]    7/19/2017         Agree with note as outlined by  NP. I confirm findings in history and physical exam. No additional findings noted. Agree with plan as outlined above.      Justyn Gabriel MD

## 2017-08-11 DIAGNOSIS — E78.2 MIXED HYPERLIPIDEMIA: ICD-10-CM

## 2017-08-11 RX ORDER — ATORVASTATIN CALCIUM 10 MG/1
10 TABLET, FILM COATED ORAL DAILY
Qty: 30 TAB | Refills: 5 | Status: SHIPPED | OUTPATIENT
Start: 2017-08-11 | End: 2017-08-11 | Stop reason: SDUPTHER

## 2017-08-11 RX ORDER — ATORVASTATIN CALCIUM 10 MG/1
10 TABLET, FILM COATED ORAL DAILY
Qty: 90 TAB | Refills: 1 | OUTPATIENT
Start: 2017-08-11 | End: 2018-02-03 | Stop reason: SDUPTHER

## 2017-08-11 NOTE — TELEPHONE ENCOUNTER
Prescription for atorvastatin 10 mg tablet take 1 by mouth daily #90 1 refill called to [address] [place] [address] per written order of Dr. Nancy Kearns.   Prescription left on pharmacy voice mail at 4:26 pm.

## 2017-08-29 RX ORDER — METOPROLOL SUCCINATE 25 MG/1
25 TABLET, EXTENDED RELEASE ORAL
Qty: 90 TAB | Refills: 3 | Status: SHIPPED | OUTPATIENT
Start: 2017-08-29 | End: 2018-08-29 | Stop reason: SDUPTHER

## 2017-08-29 NOTE — TELEPHONE ENCOUNTER
From: Norm Emmanuel III  To: Laura Bains NP  Sent: 8/29/2017 8:39 AM EDT  Subject: Medication Renewal Request    Original authorizing provider: PAU Orantes III would like a refill of the following medications:  metoprolol succinate (TOPROL-XL) 25 mg XL tablet Laura Bains NP]    Preferred pharmacy: [id number] - [place]:  hoping to get a refill of Metoprol. ... ideally a 90 day prescription to cut down on my visits to the pharmacy. Thanks.  Olivia Mccall

## 2017-09-19 ENCOUNTER — OFFICE VISIT (OUTPATIENT)
Dept: INTERNAL MEDICINE CLINIC | Age: 52
End: 2017-09-19

## 2017-09-19 VITALS
DIASTOLIC BLOOD PRESSURE: 88 MMHG | BODY MASS INDEX: 27.72 KG/M2 | RESPIRATION RATE: 18 BRPM | SYSTOLIC BLOOD PRESSURE: 138 MMHG | WEIGHT: 176.6 LBS | HEART RATE: 64 BPM | TEMPERATURE: 98.3 F | OXYGEN SATURATION: 98 % | HEIGHT: 67 IN

## 2017-09-19 DIAGNOSIS — Z23 ENCOUNTER FOR IMMUNIZATION: ICD-10-CM

## 2017-09-19 DIAGNOSIS — R10.31 GROIN PAIN, RIGHT: ICD-10-CM

## 2017-09-19 DIAGNOSIS — E78.2 MIXED HYPERLIPIDEMIA: ICD-10-CM

## 2017-09-19 DIAGNOSIS — J30.1 SEASONAL ALLERGIC RHINITIS DUE TO POLLEN: ICD-10-CM

## 2017-09-19 DIAGNOSIS — E66.3 OVERWEIGHT (BMI 25.0-29.9): ICD-10-CM

## 2017-09-19 DIAGNOSIS — I10 ESSENTIAL HYPERTENSION WITH GOAL BLOOD PRESSURE LESS THAN 140/90: Primary | ICD-10-CM

## 2017-09-19 NOTE — MR AVS SNAPSHOT
Visit Information Date & Time Provider Department Dept. Phone Encounter #  
 9/19/2017  8:50 AM Austin ChenCindy [phone] [id number] Upcoming Health Maintenance Date Due INFLUENZA AGE 9 TO ADULT 8/1/2017 COLONOSCOPY 6/12/2020 DTaP/Tdap/Td series (2 - Td) 10/4/2022 Allergies as of 9/19/2017  Review Complete On: 9/19/2017 By: Austin Chen MD  
 No Known Allergies Current Immunizations  Reviewed on 10/14/2016 Name Date Influenza Vaccine (Madin Nisland Canine Kidney) PF 11/3/2015 Influenza Vaccine Deri Mulling) 10/14/2016 Influenza Vaccine (Quad) PF  Incomplete, 9/26/2014 Influenza Vaccine PF 11/13/2013 TDAP Vaccine 10/4/2012 Not reviewed this visit You Were Diagnosed With   
  
 Codes Comments Essential hypertension with goal blood pressure less than 140/90    -  Primary ICD-10-CM: I10 
ICD-9-CM: 401.9 Mixed hyperlipidemia     ICD-10-CM: E78.2 ICD-9-CM: 272.2 Seasonal allergic rhinitis due to pollen     ICD-10-CM: J30.1 ICD-9-CM: 477.0 Encounter for immunization     ICD-10-CM: Q79 ICD-9-CM: V03.89 Overweight (BMI 25.0-29. 9)     ICD-10-CM: J32.3 ICD-9-CM: 278.02 Vitals BP Pulse Temp Resp Height(growth percentile) Weight(growth percentile) 138/88 (BP 1 Location: Left arm, BP Patient Position: Sitting) 64 98.3 °F (36.8 °C) (Oral) 18 5' 7\" (1.702 m) 176 lb 9.6 oz (80.1 kg) SpO2 BMI Smoking Status 98% 27.66 kg/m2 Former Smoker BMI and BSA Data Body Mass Index Body Surface Area  
 27.66 kg/m 2 1.95 m 2 Preferred Pharmacy Pharmacy Name Phone [facility] [address] [facility], [address] [phone] Your Updated Medication List  
  
   
This list is accurate as of: 9/19/17  9:12 AM.  Always use your most recent med list.  
  
  
  
  
 aspirin delayed-release 81 mg tablet Take 81 mg by mouth daily. atorvastatin 10 mg tablet Commonly known as:  LIPITOR Take 1 Tab by mouth daily. * hydroCHLOROthiazide 12.5 mg tablet Commonly known as:  HYDRODIURIL Take 1 Tab by mouth daily for 30 days. * hydroCHLOROthiazide 12.5 mg capsule Commonly known as:  Reymundo Calderon TAKE ONE TABLET DAILY FOR 30 DAYS-NODE DOSE DECREASE  
  
 ibuprofen 200 mg tablet Commonly known as:  MOTRIN Take 200 mg by mouth as needed. lisinopril 10 mg tablet Commonly known as:  Katlyn Gloria Take 1 Tab by mouth daily. Indications: hypertension  
  
 metoprolol succinate 25 mg XL tablet Commonly known as:  TOPROL-XL Take 1 Tab by mouth nightly. multivitamin tablet Commonly known as:  ONE A DAY Take 1 Tab by mouth daily. sildenafil citrate 50 mg tablet Commonly known as:  VIAGRA Take 30 mins to 1 hr before planned intercourse. * Notice: This list has 2 medication(s) that are the same as other medications prescribed for you. Read the directions carefully, and ask your doctor or other care provider to review them with you. We Performed the Following INFLUENZA VIRUS VAC QUAD,SPLIT,PRESV FREE SYRINGE IM [id number] CPT(R)] LIPID PANEL [02561 CPT(R)] Patient Instructions Vaccine Information Statement Influenza (Flu) Vaccine (Inactivated or Recombinant): What you need to know Many Vaccine Information Statements are available in Urdu and other languages. See www.immunize.org/vis Hojas de Información Sobre Vacunas están disponibles en Español y en muchos otros idiomas. Visite www.immunize.org/vis 1. Why get vaccinated? Influenza (flu) is a contagious disease that spreads around the [place] every year, usually between October and May. Flu is caused by influenza viruses, and is spread mainly by coughing, sneezing, and close contact. Anyone can get flu. Flu strikes suddenly and can last several days. Symptoms vary by age, but can include:  fever/chills  sore throat  muscle aches  fatigue  cough  headache  runny or stuffy nose Flu can also lead to pneumonia and blood infections, and cause diarrhea and seizures in children. If you have a medical condition, such as heart or lung disease, flu can make it worse. Flu is more dangerous for some people. Infants and young children, people 72years of age and older, pregnant women, and people with certain health conditions or a weakened immune system are at greatest risk. Each year thousands of people in the [facility] die from flu, and many more are hospitalized. Flu vaccine can: 
 keep you from getting flu, 
 make flu less severe if you do get it, and 
 keep you from spreading flu to your family and other people. 2. Inactivated and recombinant flu vaccines A dose of flu vaccine is recommended every flu season. Children 6 months through 6years of age may need two doses during the same flu season. Everyone else needs only one dose each flu season. Some inactivated flu vaccines contain a very small amount of a mercury-based preservative called thimerosal. Studies have not shown thimerosal in vaccines to be harmful, but flu vaccines that do not contain thimerosal are available. There is no live flu virus in flu shots. They cannot cause the flu. There are many flu viruses, and they are always changing. Each year a new flu vaccine is made to protect against three or four viruses that are likely to cause disease in the upcoming flu season. But even when the vaccine doesnt exactly match these viruses, it may still provide some protection Flu vaccine cannot prevent: 
 flu that is caused by a virus not covered by the vaccine, or 
 illnesses that look like flu but are not. It takes about 2 weeks for protection to develop after vaccination, and protection lasts through the flu season. 3. Some people should not get this vaccine Tell the person who is giving you the vaccine:  If you have any severe, life-threatening allergies. If you ever had a life-threatening allergic reaction after a dose of flu vaccine, or have a severe allergy to any part of this vaccine, you may be advised not to get vaccinated. Most, but not all, types of flu vaccine contain a small amount of egg protein.  If you ever had Guillain-Barré Syndrome (also called GBS). Some people with a history of GBS should not get this vaccine. This should be discussed with your doctor.  If you are not feeling well. It is usually okay to get flu vaccine when you have a mild illness, but you might be asked to come back when you feel better. 4. Risks of a vaccine reaction With any medicine, including vaccines, there is a chance of reactions. These are usually mild and go away on their own, but serious reactions are also possible. Most people who get a flu shot do not have any problems with it. Minor problems following a flu shot include:  
 soreness, redness, or swelling where the shot was given  hoarseness  sore, red or itchy eyes  cough  fever  aches  headache  itching  fatigue If these problems occur, they usually begin soon after the shot and last 1 or 2 days. More serious problems following a flu shot can include the following:  There may be a small increased risk of Guillain-Barré Syndrome (GBS) after inactivated flu vaccine. This risk has been estimated at 1 or 2 additional cases per million people vaccinated. This is much lower than the risk of severe complications from flu, which can be prevented by flu vaccine.  Young children who get the flu shot along with pneumococcal vaccine (PCV13) and/or DTaP vaccine at the same time might be slightly more likely to have a seizure caused by fever. Ask your doctor for more information. Tell your doctor if a child who is getting flu vaccine has ever had a seizure. Problems that could happen after any injected vaccine:  People sometimes faint after a medical procedure, including vaccination. Sitting or lying down for about 15 minutes can help prevent fainting, and injuries caused by a fall. Tell your doctor if you feel dizzy, or have vision changes or ringing in the ears.  Some people get severe pain in the shoulder and have difficulty moving the arm where a shot was given. This happens very rarely.  Any medication can cause a severe allergic reaction. Such reactions from a vaccine are very rare, estimated at about 1 in a million doses, and would happen within a few minutes to a few hours after the vaccination. As with any medicine, there is a very remote chance of a vaccine causing a serious injury or death. The safety of vaccines is always being monitored. For more information, visit: www.cdc.gov/vaccinesafety/ 
 
 
6. The National Vaccine Injury Compensation Program 
 
The [facility] Vaccine Injury Compensation Program (VICP) is a federal program that was created to compensate people who may have been injured by certain vaccines. Persons who believe they may have been injured by a vaccine can learn about the program and about filing a claim by calling 0-669.979.5431 or visiting the [employer] website at www.[facility].gov/vaccinecompensation. There is a time limit to file a claim for compensation. 7. How can I learn more?  Ask your healthcare provider. He or she can give you the vaccine package insert or suggest other sources of information.  Call your local or state health department.  Contact the Centers for Disease Control and Prevention (CDC): 
- Call 8-296.614.8991 (1-800-CDC-INFO) or 
- Visit CDCs website at www.cdc.gov/flu Vaccine Information Statement Inactivated Influenza Vaccine 8/7/2015 
[address] Shermand[address] [id number] [facility] and [employer] [place] Centers for Disease Control and Prevention Office Use Only Seasonal Allergies: Care Instructions Your Care Instructions Allergies occur when your body's defense system (immune system) overreacts to certain substances. The immune system treats a harmless substance as if it were a harmful germ or virus. Many things can cause this to happen. Examples include pollens, medicine, food, dust, animal dander, and mold. Your allergies are seasonal if you have symptoms just at certain times of the year. In that case, you are probably allergic to pollens from certain trees, grasses, or weeds. Allergies can be mild or severe. Over-the-counter allergy medicine may help with some symptoms. Read and follow all instructions on the label. Managing your allergies is an important part of staying healthy. Your doctor may suggest that you have tests to help find the cause of your allergies. When you know what things trigger your symptoms, you can avoid them. This can prevent allergy symptoms and other health problems. In some cases, immunotherapy might help. For this treatment, you get shots or use pills that have a small amount of certain allergens in them. Your body \"gets used to\" the allergen, so you react less to it over time. This kind of treatment may help prevent or reduce some allergy symptoms. Follow-up care is a key part of your treatment and safety. Be sure to make and go to all appointments, and call your doctor if you are having problems. It's also a good idea to know your test results and keep a list of the medicines you take. How can you care for yourself at home? · Be safe with medicines. Take your medicines exactly as prescribed. Call your doctor if you think you are having a problem with your medicine. · During your allergy season, keep windows closed. If you need to use air-conditioning, change or clean all filters every month. Take a shower and change your clothes after you have been outside. · Stay inside when pollen counts are high. Vacuum once or twice a week. Use a vacuum  with a HEPA filter or a double-thickness filter. When should you call for help? Give an epinephrine shot if: 
· You think you are having a severe allergic reaction. After giving an epinephrine shot, call 911, even if you feel better. Call 911 if: 
· You have symptoms of a severe allergic reaction. These may include: 
¨ Sudden raised, red areas (hives) all over your body. ¨ Swelling of the throat, mouth, lips, or tongue. ¨ Trouble breathing. ¨ Passing out (losing consciousness). Or you may feel very lightheaded or suddenly feel weak, confused, or restless. · You have been given an epinephrine shot, even if you feel better. Call your doctor now or seek immediate medical care if: 
· You have symptoms of an allergic reaction, such as: ¨ A rash or hives (raised, red areas on the skin). ¨ Itching. ¨ Swelling. ¨ Belly pain, nausea, or vomiting.  
Watch closely for changes in your health, and be sure to contact your doctor if: 
· You do not get better as expected. Where can you learn more? Go to http://manuela-avtar.info/. Enter J912 in the search box to learn more about \"Seasonal Allergies: Care Instructions. \" Current as of: September 29, 2016 Content Version: 11.3 © 2888-6292 [facility]. Care instructions adapted under license by [employer] (which disclaims liability or warranty for this information). If you have questions about a medical condition or this instruction, always ask your healthcare professional. [address] any warranty or liability for your use of this information. Introducing [facility]! Dear Polo Mcgee: 
Thank you for requesting a [employer] account. Our records indicate that you already have an active [employer] account. You can access your account anytime at [email or web address] Did you know that you can access your hospital and ER discharge instructions at any time in [employer]? You can also review all of your test results from your hospital stay or ER visit. Additional Information If you have questions, please visit the Frequently Asked Questions section of the [employer] website at [email or web address] Remember, [employer] is NOT to be used for urgent needs. For medical emergencies, dial 911. Now available from your iPhone and Android! Please provide this summary of care documentation to your next provider. Your primary care clinician is listed as Claude Marin. If you have any questions after today's visit, please call [phone].

## 2017-09-19 NOTE — PROGRESS NOTES
CC:   Chief Complaint   Patient presents with    Cholesterol Problem    Hypertension       HISTORY OF PRESENT ILLNESS  Cristina Case III is a 47 y.o. male. Presents for 3 month follow up evaluation. He has HTN, hyperlipidemia, prediabetes, heart palpitations, allergic rhinitis, family history of prostate cancer, and family history of colon cancer. He has no complaints today. Recently having runny nose and sneezing. Denies CP, SOB, palpitations, abdominal pain, or leg swelling. No recent groin pain.     Reports compliance with low-salt, low-fat diet and medications. Gets sporadic exercise. Saw Dr. Mary Stubbs in 10/16 about heart palpitations. Stress test and echo were normal. 24 hr Holter monitor showed episodes of sinus tachycardia that correlated with his symptoms. Was started on metoprolol. Heart palpitations less frequent and less severe now.     Soc Hx  . Has 2 children ages 11 and 6. He works as the [employer]. Job is stressful. Former smoker; quit in 1997 (smoked 1/2 ppd for 2 yrs). Drinks 1/2 to 1 glass of wine with dinner. Denies recreational drug use. Gets occasional exercise by playing basket      Health Maintenance  Flu vaccine: 9/19/17                                                           Tetanus vaccine: Tdap 10/4/12                                    Colonoscopy: 6/12/15 (diverticulosis, grade 1 internal hemorrhoids); next due in 10 yrs (2020)  Eye exam: Fall 2015 ([facility])                  ROS  A complete review of systems was performed and is negative except for those mentioned in the HPI. Patient Active Problem List   Diagnosis Code    Family history of colon cancer Z80.0    Family history of prostate cancer Z80.42    Palpitations R00.2    Low back pain M54.5    Essential hypertension with goal blood pressure less than 140/90 I10    Hyperlipidemia E78.5    Allergic rhinitis J30.9    Overweight (BMI 25.0-29. 9) E66.3     Past Medical History: Diagnosis Date    Allergic rhinitis     Family history of colon cancer     Family history of prostate cancer     Heart palpitations 2011    Event monitor: occ PVC's. Echo nl. Eval by Dr. Fay Byrne Hypertension     Lumbar back pain      No Known Allergies     Current Outpatient Prescriptions   Medication Sig Dispense Refill    metoprolol succinate (TOPROL-XL) 25 mg XL tablet Take 1 Tab by mouth nightly. 90 Tab 3    lisinopril (PRINIVIL, ZESTRIL) 10 mg tablet Take 1 Tab by mouth daily. Indications: hypertension 90 Tab 3    atorvastatin (LIPITOR) 10 mg tablet Take 1 Tab by mouth daily. 90 Tab 1    hydroCHLOROthiazide (MICROZIDE) 12.5 mg capsule TAKE ONE TABLET DAILY FOR 30 DAYS-NODE DOSE DECREASE 30 Cap 2    sildenafil citrate (VIAGRA) 50 mg tablet Take 30 mins to 1 hr before planned intercourse. 4 Tab 0    multivitamin (ONE A DAY) tablet Take 1 Tab by mouth daily.  ibuprofen (MOTRIN) 200 mg tablet Take 200 mg by mouth as needed.  aspirin delayed-release 81 mg tablet Take 81 mg by mouth daily.  hydroCHLOROthiazide (HYDRODIURIL) 12.5 mg tablet Take 1 Tab by mouth daily for 30 days. 30 Tab 2         PHYSICAL EXAM  Visit Vitals    /88 (BP 1 Location: Left arm, BP Patient Position: Sitting)    Pulse 64    Temp 98.3 °F (36.8 °C) (Oral)    Resp 18    Ht 5' 7\" (1.702 m)    Wt 176 lb 9.6 oz (80.1 kg)    SpO2 98%    BMI 27.66 kg/m2       General: Well-developed and well-nourished, no distress. HEENT:  Head normocephalic/atraumatic, no scleral icterus  Neck: Supple. No carotid bruits, JVD, lymphadenopathy, or thyromegaly. Lungs:  Clear to ausculation bilaterally. Good air movement. Heart:  Regular rate and rhythm, normal S1 and S2, no murmur, gallop, or rub  Abdomen: Soft, non-distended, normal bowel sounds, no tenderness, no guarding, masses, rebound tenderness, or HSM. Extremities: No clubbing, cyanosis, or edema. Neurological: Alert and oriented.   Psychiatric: Normal mood and affect. Behavior is normal.     Results for orders placed or performed in visit on 05/16/17   LIPID PANEL   Result Value Ref Range    Cholesterol, total 196 100 - 199 mg/dL    Triglyceride 42 0 - 149 mg/dL    HDL Cholesterol 124 >39 mg/dL    VLDL, calculated 8 5 - 40 mg/dL    LDL, calculated 64 0 - 99 mg/dL   CVD REPORT   Result Value Ref Range    INTERPRETATION Note          ASSESSMENT AND PLAN    ICD-10-CM ICD-9-CM    1. Essential hypertension with goal blood pressure less than 140/90 I10 401.9    2. Mixed hyperlipidemia E78.2 272.2 LIPID PANEL   3. Seasonal allergic rhinitis due to pollen J30.1 477.0    4. Groin pain, right R10.31 789.09    5. Overweight (BMI 25.0-29. 9) E66.3 278.02    6. Encounter for immunization Z23 V03.89 INFLUENZA VIRUS VAC QUAD,SPLIT,PRESV FREE SYRINGE IM        Diagnoses and all orders for this visit:    1. Essential hypertension with goal blood pressure less than 140/90  /88 today. Controlled although getting closer to upper limit. Continue lisinopril, HCTZ, and metoprolol. 2. Mixed hyperlipidemia  Controlled on atorvastatin 10 mg daily.  -     LIPID PANEL     3. Seasonal allergic rhinitis due to pollen  Recommended OTC antihistamine and Flonase nasal spray as needed. 4. Groin pain, right  Resolved. US scrotum normal. Most likely secondary to non-infectious epididymitis, which can be precipitated by prolonged periods of sitting (as with sedentary desk jobs or long plane rides) or vigorous exercise. 5. Overweight (BMI 25.0-29. 9)  Counseled on diet, exercise, and weight loss. Follow up BMI in 6 months. 6. Encounter for immunization  -     Influenza virus vaccine (QUADRIVALENT PRES FREE SYRINGE) IM (26067)      Follow-up Disposition:  Return in about 6 months (around 3/19/2018), or if symptoms worsen or fail to improve, for HTN, hyperlipidemia. Provided patient and/or family with advanced directive information and answered pertinent questions.   Encouraged patient to provide a copy of advanced directive to the office when available. I have discussed the diagnosis with the patient and the intended plan as seen in the above orders. Patient is in agreement. The patient has received an after-visit summary and questions were answered concerning future plans. I have discussed medication side effects and warnings with the patient as well.

## 2017-09-19 NOTE — LETTER
9/26/2017 11:34 AM 
 
Mr. Juana Keyes III 
[address] 
[address] [address] Dear Juana Keyes III: 
 
Please find your most recent results below. Resulted Orders LIPID PANEL Result Value Ref Range Cholesterol, total 205 (H) 100 - 199 mg/dL Triglyceride 42 0 - 149 mg/dL HDL Cholesterol 133 >39 mg/dL Comment:  
   Results confirmed on 
dilution. VLDL, calculated 8 5 - 40 mg/dL LDL, calculated 64 0 - 99 mg/dL Narrative Performed at:  [address]  [id number] : Praful Oneil MD, Phone:  [phone] CVD REPORT Result Value Ref Range INTERPRETATION Note Comment:  
   Supplement report is available. Narrative Performed at:  [address] [place] [address]  [id number] : Michelle Valverde PhD, Phone:  [phone] RECOMMENDATIONS: 
Your cholesterol returned very good. Your total cholesterol is a little high because your HDL, or good cholesterol, is high. Your LDL, or bad cholesterol, is at a good level. Continue taking atorvastatin 10 mg once daily. Please call me if you have any questions: [phone] Sincerely, Lisa Roblero MD

## 2017-09-19 NOTE — PROGRESS NOTES
Emilie Donald III is a 47 y.o. male  Chief Complaint   Patient presents with    Cholesterol Problem    Hypertension     1. Have you been to an emergency room, urgent clinic, or hospitalized since your last visit? NO  If yes, where when, and reason for visit? 2. Have seen or consulted any other health care provider since your last visit? NO  Please include any pap smears or colon screening in this section  If yes, where when, and reason for visit? 6. Do you have an Advanced Directive/ Living Will in place?  YES  If yes, do we have a copy on file NO  If no, would you like information NO

## 2017-09-19 NOTE — PATIENT INSTRUCTIONS
Vaccine Information Statement    Influenza (Flu) Vaccine (Inactivated or Recombinant): What you need to know    Many Vaccine Information Statements are available in Thai and other languages. See www.immunize.org/vis  Hojas de Información Sobre Vacunas están disponibles en Español y en muchos otros idiomas. Visite www.immunize.org/vis    1. Why get vaccinated? Influenza (flu) is a contagious disease that spreads around the [place] every year, usually between October and May. Flu is caused by influenza viruses, and is spread mainly by coughing, sneezing, and close contact. Anyone can get flu. Flu strikes suddenly and can last several days. Symptoms vary by age, but can include:   fever/chills   sore throat   muscle aches   fatigue   cough   headache    runny or stuffy nose    Flu can also lead to pneumonia and blood infections, and cause diarrhea and seizures in children. If you have a medical condition, such as heart or lung disease, flu can make it worse. Flu is more dangerous for some people. Infants and young children, people 72years of age and older, pregnant women, and people with certain health conditions or a weakened immune system are at greatest risk. Each year thousands of people in the [facility] die from flu, and many more are hospitalized. Flu vaccine can:   keep you from getting flu,   make flu less severe if you do get it, and   keep you from spreading flu to your family and other people. 2. Inactivated and recombinant flu vaccines    A dose of flu vaccine is recommended every flu season. Children 6 months through 6years of age may need two doses during the same flu season. Everyone else needs only one dose each flu season.        Some inactivated flu vaccines contain a very small amount of a mercury-based preservative called thimerosal. Studies have not shown thimerosal in vaccines to be harmful, but flu vaccines that do not contain thimerosal are available. There is no live flu virus in flu shots. They cannot cause the flu. There are many flu viruses, and they are always changing. Each year a new flu vaccine is made to protect against three or four viruses that are likely to cause disease in the upcoming flu season. But even when the vaccine doesnt exactly match these viruses, it may still provide some protection    Flu vaccine cannot prevent:   flu that is caused by a virus not covered by the vaccine, or   illnesses that look like flu but are not. It takes about 2 weeks for protection to develop after vaccination, and protection lasts through the flu season. 3. Some people should not get this vaccine    Tell the person who is giving you the vaccine:     If you have any severe, life-threatening allergies. If you ever had a life-threatening allergic reaction after a dose of flu vaccine, or have a severe allergy to any part of this vaccine, you may be advised not to get vaccinated. Most, but not all, types of flu vaccine contain a small amount of egg protein.  If you ever had Guillain-Barré Syndrome (also called GBS). Some people with a history of GBS should not get this vaccine. This should be discussed with your doctor.  If you are not feeling well. It is usually okay to get flu vaccine when you have a mild illness, but you might be asked to come back when you feel better. 4. Risks of a vaccine reaction    With any medicine, including vaccines, there is a chance of reactions. These are usually mild and go away on their own, but serious reactions are also possible. Most people who get a flu shot do not have any problems with it.      Minor problems following a flu shot include:    soreness, redness, or swelling where the shot was given     hoarseness   sore, red or itchy eyes   cough   fever   aches   headache   itching   fatigue  If these problems occur, they usually begin soon after the shot and last 1 or 2 days. More serious problems following a flu shot can include the following:     There may be a small increased risk of Guillain-Barré Syndrome (GBS) after inactivated flu vaccine. This risk has been estimated at 1 or 2 additional cases per million people vaccinated. This is much lower than the risk of severe complications from flu, which can be prevented by flu vaccine.  Young children who get the flu shot along with pneumococcal vaccine (PCV13) and/or DTaP vaccine at the same time might be slightly more likely to have a seizure caused by fever. Ask your doctor for more information. Tell your doctor if a child who is getting flu vaccine has ever had a seizure. Problems that could happen after any injected vaccine:      People sometimes faint after a medical procedure, including vaccination. Sitting or lying down for about 15 minutes can help prevent fainting, and injuries caused by a fall. Tell your doctor if you feel dizzy, or have vision changes or ringing in the ears.  Some people get severe pain in the shoulder and have difficulty moving the arm where a shot was given. This happens very rarely.  Any medication can cause a severe allergic reaction. Such reactions from a vaccine are very rare, estimated at about 1 in a million doses, and would happen within a few minutes to a few hours after the vaccination. As with any medicine, there is a very remote chance of a vaccine causing a serious injury or death. The safety of vaccines is always being monitored. For more information, visit: www.cdc.gov/vaccinesafety/    5. What if there is a serious reaction? What should I look for?  Look for anything that concerns you, such as signs of a severe allergic reaction, very high fever, or unusual behavior.     Signs of a severe allergic reaction can include hives, swelling of the face and throat, difficulty breathing, a fast heartbeat, dizziness, and weakness  usually within a few minutes to a few hours after the vaccination. What should I do?  If you think it is a severe allergic reaction or other emergency that cant wait, call 9-1-1 and get the person to the nearest hospital. Otherwise, call your doctor.  Reactions should be reported to the Vaccine Adverse Event Reporting System (VAERS). Your doctor should file this report, or you can do it yourself through  the VAERS web site at www.vaers. [facility].gov, or by calling 2-668.832.8950. VAERS does not give medical advice. 6. The National Vaccine Injury Compensation Program    The [facility] Vaccine Injury Compensation Program (VICP) is a federal program that was created to compensate people who may have been injured by certain vaccines. Persons who believe they may have been injured by a vaccine can learn about the program and about filing a claim by calling 1-979.499.5816 or visiting the [employer] website at www.[facility].gov/vaccinecompensation. There is a time limit to file a claim for compensation. 7. How can I learn more?  Ask your healthcare provider. He or she can give you the vaccine package insert or suggest other sources of information.  Call your local or state health department.  Contact the Centers for Disease Control and Prevention (CDC):  - Call 8-275.303.7299 (1-800-CDC-INFO) or  - Visit CDCs website at www.cdc.gov/flu    Vaccine Information Statement   Inactivated Influenza Vaccine   8/7/2015  [address] [id number]    Department of Health and Human Services  Centers for Disease Control and Prevention    Office Use Only       Seasonal Allergies: Care Instructions  Your Care Instructions  Allergies occur when your body's defense system (immune system) overreacts to certain substances. The immune system treats a harmless substance as if it were a harmful germ or virus. Many things can cause this to happen. Examples include pollens, medicine, food, dust, animal dander, and mold.   Your allergies are seasonal if you have symptoms just at certain times of the year. In that case, you are probably allergic to pollens from certain trees, grasses, or weeds. Allergies can be mild or severe. Over-the-counter allergy medicine may help with some symptoms. Read and follow all instructions on the label. Managing your allergies is an important part of staying healthy. Your doctor may suggest that you have tests to help find the cause of your allergies. When you know what things trigger your symptoms, you can avoid them. This can prevent allergy symptoms and other health problems. In some cases, immunotherapy might help. For this treatment, you get shots or use pills that have a small amount of certain allergens in them. Your body \"gets used to\" the allergen, so you react less to it over time. This kind of treatment may help prevent or reduce some allergy symptoms. Follow-up care is a key part of your treatment and safety. Be sure to make and go to all appointments, and call your doctor if you are having problems. It's also a good idea to know your test results and keep a list of the medicines you take. How can you care for yourself at home? · Be safe with medicines. Take your medicines exactly as prescribed. Call your doctor if you think you are having a problem with your medicine. · During your allergy season, keep windows closed. If you need to use air-conditioning, change or clean all filters every month. Take a shower and change your clothes after you have been outside. · Stay inside when pollen counts are high. Vacuum once or twice a week. Use a vacuum  with a HEPA filter or a double-thickness filter. When should you call for help? Give an epinephrine shot if:  · You think you are having a severe allergic reaction. After giving an epinephrine shot, call 911, even if you feel better. Call 911 if:  · You have symptoms of a severe allergic reaction.  These may include:  ¨ Sudden raised, red areas (hives) all over your body.  ¨ Swelling of the throat, mouth, lips, or tongue. ¨ Trouble breathing. ¨ Passing out (losing consciousness). Or you may feel very lightheaded or suddenly feel weak, confused, or restless. · You have been given an epinephrine shot, even if you feel better. Call your doctor now or seek immediate medical care if:  · You have symptoms of an allergic reaction, such as:  ¨ A rash or hives (raised, red areas on the skin). ¨ Itching. ¨ Swelling. ¨ Belly pain, nausea, or vomiting. Watch closely for changes in your health, and be sure to contact your doctor if:  · You do not get better as expected. Where can you learn more? Go to http://manuela-avtar.info/. Enter J912 in the search box to learn more about \"Seasonal Allergies: Care Instructions. \"  Current as of: September 29, 2016  Content Version: 11.3  © 4327-2865 [employer]. Care instructions adapted under license by [employer] (which disclaims liability or warranty for this information). If you have questions about a medical condition or this instruction, always ask your healthcare professional. [address] [address] any warranty or liability for your use of this information.

## 2017-09-20 LAB
CHOLEST SERPL-MCNC: 205 MG/DL (ref 100–199)
HDLC SERPL-MCNC: 133 MG/DL
INTERPRETATION, 910389: NORMAL
LDLC SERPL CALC-MCNC: 64 MG/DL (ref 0–99)
TRIGL SERPL-MCNC: 42 MG/DL (ref 0–149)
VLDLC SERPL CALC-MCNC: 8 MG/DL (ref 5–40)

## 2017-09-21 NOTE — PROGRESS NOTES
Your cholesterol returned very good. Your total cholesterol is a little high because your HDL, or good cholesterol, is high. Your LDL, or bad cholesterol, is at a good level. Continue taking atorvastatin 10 mg once daily.

## 2017-12-21 ENCOUNTER — TELEPHONE (OUTPATIENT)
Dept: INTERNAL MEDICINE CLINIC | Age: 52
End: 2017-12-21

## 2017-12-21 NOTE — TELEPHONE ENCOUNTER
Patient stopped by office to be seen for groin pain and lower back pain. Denies any new urinary issues(states stream is always weak) or any bowel issues. He does not appear to be in any distress. Unable to get patient in today to be seen. Offered appt for 11/22/17. Patient prefers to be seen today and he will go to urgent care.

## 2018-01-12 ENCOUNTER — OFFICE VISIT (OUTPATIENT)
Dept: SURGERY | Age: 53
End: 2018-01-12

## 2018-01-12 VITALS
DIASTOLIC BLOOD PRESSURE: 88 MMHG | SYSTOLIC BLOOD PRESSURE: 134 MMHG | WEIGHT: 180 LBS | HEIGHT: 67 IN | BODY MASS INDEX: 28.25 KG/M2 | HEART RATE: 71 BPM | OXYGEN SATURATION: 98 %

## 2018-01-12 DIAGNOSIS — K40.90 RIGHT INGUINAL HERNIA: Primary | ICD-10-CM

## 2018-01-12 NOTE — PROGRESS NOTES
HISTORY OF PRESENT ILLNESS  Adrián Montalvo III is a 47 y.o. male who comes in for consultation by Dr Leoncio Carlson for a hernia  HPI  He has been having some right groin for pain. He went to Dr Leoncio Carlson for evaluation. She thought he had a RIH and referred him here. He also reports some left groin pain. He does not feel a bulge and denies associated nausea, vomiting, diarrhea, constipation, melena, hematochezia, dysuria or hematuria. He does report some mild urinary hesitancy and frequency. He has not had surgery in the area previously. Past Medical History:   Diagnosis Date    Allergic rhinitis     Family history of colon cancer     Family history of prostate cancer     Heart palpitations 2011    Event monitor: occ PVC's. Echo nl. Eval by Dr. Devan Aviles Hypertension     Lumbar back pain      Past Surgical History:   Procedure Laterality Date    HX COLONOSCOPY  2008    Dr Shayan Aj HX COLONOSCOPY  6/12/15    Dr. Peter Florez; diverticulosis, int. hemorrhoids     Family History   Problem Relation Age of Onset    Cancer Mother      rectal ca    Diabetes Mother     Other Mother      thyroid issues/unknown type    Cancer Sister      Social History   Substance Use Topics    Smoking status: Former Smoker     Packs/day: 0.50     Years: 2.00     Types: Cigarettes     Quit date: 1/3/1997    Smokeless tobacco: Former User     Types: Chew     Quit date: 1/3/1996    Alcohol use 3.5 oz/week     7 Standard drinks or equivalent per week     Current Outpatient Prescriptions   Medication Sig    metoprolol succinate (TOPROL-XL) 25 mg XL tablet Take 1 Tab by mouth nightly.  lisinopril (PRINIVIL, ZESTRIL) 10 mg tablet Take 1 Tab by mouth daily. Indications: hypertension    atorvastatin (LIPITOR) 10 mg tablet Take 1 Tab by mouth daily.     hydroCHLOROthiazide (MICROZIDE) 12.5 mg capsule TAKE ONE TABLET DAILY FOR 30 DAYS-NODE DOSE DECREASE    sildenafil citrate (VIAGRA) 50 mg tablet Take 30 mins to 1 hr before planned intercourse.  multivitamin (ONE A DAY) tablet Take 1 Tab by mouth daily.  ibuprofen (MOTRIN) 200 mg tablet Take 200 mg by mouth as needed.  aspirin delayed-release 81 mg tablet Take 81 mg by mouth daily.  hydroCHLOROthiazide (HYDRODIURIL) 12.5 mg tablet Take 1 Tab by mouth daily for 30 days. No current facility-administered medications for this visit. No Known Allergies    ROS  Visit Vitals    /88 (BP 1 Location: Right arm, BP Patient Position: Sitting)    Pulse 71    Ht 5' 7\" (1.702 m)    Wt 81.6 kg (180 lb)    SpO2 98%    BMI 28.19 kg/m2       Physical Exam   Constitutional: He is oriented to person, place, and time. He appears well-developed and well-nourished. No distress. HENT:   Head: Normocephalic and atraumatic. Mouth/Throat: Oropharynx is clear and moist. No oropharyngeal exudate. Eyes: Conjunctivae and EOM are normal. Pupils are equal, round, and reactive to light. No scleral icterus. Neck: Normal range of motion. Neck supple. No tracheal deviation present. No thyromegaly present. Cardiovascular: Normal rate, regular rhythm and normal heart sounds. Exam reveals no gallop and no friction rub. No murmur heard. Pulmonary/Chest: Effort normal and breath sounds normal. No stridor. No respiratory distress. He has no wheezes. He has no rales. Abdominal: Soft. Normal appearance and bowel sounds are normal. He exhibits no distension, no pulsatile liver and no mass. There is no hepatosplenomegaly. There is no tenderness. There is no rebound, no guarding, no CVA tenderness, no tenderness at McBurney's point and negative Baptiste's sign. A hernia is present. Hernia confirmed positive in the right inguinal area (small). Hernia confirmed negative in the ventral area and confirmed negative in the left inguinal area. Genitourinary: Testes normal. Cremasteric reflex is present. Circumcised. Musculoskeletal: Normal range of motion. He exhibits no edema or tenderness. Lymphadenopathy:     He has no cervical adenopathy. Right: No inguinal adenopathy present. Left: No inguinal adenopathy present. Neurological: He is alert and oriented to person, place, and time. No cranial nerve deficit. Coordination normal.   Skin: Skin is warm and dry. No rash noted. He is not diaphoretic. No erythema. Psychiatric: He has a normal mood and affect. His behavior is normal. Judgment and thought content normal.       ASSESSMENT and PLAN  1. Small mildly symptomatic right inguinal hernia, left groin discomfort but no definitive hernia on exam today. I explained about the anatomy and pathophysiology of hernias and the risk of incarceration and strangulation of the bowel. I explained about hernia repairs (open with and without mesh, and robotic assisted and laparoscopic with mesh). I explained the risks and benefits of repair including bleeding, infection, chronic pain, orchalgia, loss of testes, bowel or bladder injury, hernia recurrence, seroma, mesh infection requiring removal.  I explained it would be a six to eight week recuperation with no driving for 5 - 7 days, no lifting for six weeks.    Prior to any intervention would recommend a left groin US with stress views to assess for an occult hernia    He prefers observation for now    Serge Mcgee MD FACS

## 2018-01-12 NOTE — MR AVS SNAPSHOT
Visit Information Date & Time Provider Department Dept. Phone Encounter #  
 1/12/2018  9:00 AM Raúl Vee MD [facility] [id number] Your Appointments 3/20/2018  8:10 AM  
ESTABLISHED PATIENT with MD Lydia Hunter [facility]) Appt Note: 6mth f/u;  
 [address] [place] [address] [phone]  
  
   
 [address] Upcoming Health Maintenance Date Due COLONOSCOPY 6/12/2020 DTaP/Tdap/Td series (2 - Td) 10/4/2022 Allergies as of 1/12/2018  Review Complete On: 1/12/2018 By: Raúl Vee MD  
 No Known Allergies Current Immunizations  Reviewed on 10/14/2016 Name Date Influenza Vaccine (Madin Erna Canine Kidney) PF 11/3/2015 Influenza Vaccine Justyna Priestly) 10/14/2016 Influenza Vaccine (Quad) PF 9/19/2017, 9/26/2014 Influenza Vaccine PF 11/13/2013 TDAP Vaccine 10/4/2012 Not reviewed this visit Vitals BP Pulse Height(growth percentile) Weight(growth percentile) SpO2 BMI  
 134/88 (BP 1 Location: Right arm, BP Patient Position: Sitting) 71 5' 7\" (1.702 m) 180 lb (81.6 kg) 98% 28.19 kg/m2 Smoking Status Former Smoker Vitals History BMI and BSA Data Body Mass Index Body Surface Area  
 28.19 kg/m 2 1.96 m 2 Preferred Pharmacy Pharmacy Name Phone [facility] [address] [facility], [address] [place]  [phone] Your Updated Medication List  
  
   
This list is accurate as of: 1/12/18 12:22 PM.  Always use your most recent med list.  
  
  
  
  
 aspirin delayed-release 81 mg tablet Take 81 mg by mouth daily. atorvastatin 10 mg tablet Commonly known as:  LIPITOR Take 1 Tab by mouth daily. * hydroCHLOROthiazide 12.5 mg tablet Commonly known as:  HYDRODIURIL Take 1 Tab by mouth daily for 30 days. * hydroCHLOROthiazide 12.5 mg capsule Commonly known as:  Lei Alarcon TAKE ONE TABLET DAILY FOR 30 DAYS-NODE DOSE DECREASE  
  
 ibuprofen 200 mg tablet Commonly known as:  MOTRIN Take 200 mg by mouth as needed. lisinopril 10 mg tablet Commonly known as:  Birddelmy Christopher Take 1 Tab by mouth daily. Indications: hypertension  
  
 metoprolol succinate 25 mg XL tablet Commonly known as:  TOPROL-XL Take 1 Tab by mouth nightly. multivitamin tablet Commonly known as:  ONE A DAY Take 1 Tab by mouth daily. sildenafil citrate 50 mg tablet Commonly known as:  VIAGRA Take 30 mins to 1 hr before planned intercourse. * Notice: This list has 2 medication(s) that are the same as other medications prescribed for you. Read the directions carefully, and ask your doctor or other care provider to review them with you. Introducing [facility] SERVICES! Dear Concha Spaulding: 
Thank you for requesting a Samfind account. Our records indicate that you already have an active Samfind account. You can access your account anytime at [email or web address] Did you know that you can access your hospital and ER discharge instructions at any time in Samfind? You can also review all of your test results from your hospital stay or ER visit. Additional Information If you have questions, please visit the Frequently Asked Questions section of the Samfind website at [email or web address] Remember, Samfind is NOT to be used for urgent needs. For medical emergencies, dial 911. Now available from your iPhone and Android! Please provide this summary of care documentation to your next provider. Your primary care clinician is listed as Junior Gonzalez. If you have any questions after today's visit, please call [phone].

## 2018-01-14 PROBLEM — K40.90 RIGHT INGUINAL HERNIA: Status: ACTIVE | Noted: 2018-01-14

## 2018-02-03 DIAGNOSIS — E78.2 MIXED HYPERLIPIDEMIA: ICD-10-CM

## 2018-02-04 RX ORDER — ATORVASTATIN CALCIUM 10 MG/1
TABLET, FILM COATED ORAL
Qty: 90 TAB | Refills: 0 | Status: SHIPPED | OUTPATIENT
Start: 2018-02-04 | End: 2018-05-12 | Stop reason: SDUPTHER

## 2018-02-05 DIAGNOSIS — E78.2 MIXED HYPERLIPIDEMIA: ICD-10-CM

## 2018-02-05 RX ORDER — ATORVASTATIN CALCIUM 10 MG/1
TABLET, FILM COATED ORAL
Qty: 90 TAB | Refills: 0 | Status: CANCELLED | OUTPATIENT
Start: 2018-02-05

## 2018-03-01 RX ORDER — HYDROCHLOROTHIAZIDE 12.5 MG/1
CAPSULE ORAL
Qty: 90 CAP | Refills: 3 | Status: SHIPPED | OUTPATIENT
Start: 2018-03-01 | End: 2019-03-02 | Stop reason: SDUPTHER

## 2018-03-01 NOTE — TELEPHONE ENCOUNTER
From: Azalea Stein III  To: Solo Oviedo NP  Sent: 3/1/2018 9:07 AM EST  Subject: Medication Renewal Request    Original authorizing provider: PAU Moise III would like a refill of the following medications:  hydroCHLOROthiazide (MICROZIDE) 12.5 mg capsule Solo Oviedo NP]    Preferred pharmacy: [id number], [address] [place] [address]:  Hoping to get a refill on this prescription sent to the [facility] in [address] [place] [address] Telephone

## 2018-03-20 ENCOUNTER — OFFICE VISIT (OUTPATIENT)
Dept: INTERNAL MEDICINE CLINIC | Age: 53
End: 2018-03-20

## 2018-03-20 VITALS
HEART RATE: 64 BPM | TEMPERATURE: 98.3 F | HEIGHT: 67 IN | SYSTOLIC BLOOD PRESSURE: 117 MMHG | WEIGHT: 178 LBS | DIASTOLIC BLOOD PRESSURE: 72 MMHG | RESPIRATION RATE: 16 BRPM | BODY MASS INDEX: 27.94 KG/M2 | OXYGEN SATURATION: 98 %

## 2018-03-20 DIAGNOSIS — Z13.1 SCREENING FOR DIABETES MELLITUS: ICD-10-CM

## 2018-03-20 DIAGNOSIS — Z12.5 SCREENING PSA (PROSTATE SPECIFIC ANTIGEN): ICD-10-CM

## 2018-03-20 DIAGNOSIS — K40.90 RIGHT INGUINAL HERNIA: ICD-10-CM

## 2018-03-20 DIAGNOSIS — E66.3 OVERWEIGHT (BMI 25.0-29.9): ICD-10-CM

## 2018-03-20 DIAGNOSIS — E78.2 MIXED HYPERLIPIDEMIA: ICD-10-CM

## 2018-03-20 DIAGNOSIS — I10 ESSENTIAL HYPERTENSION: Primary | ICD-10-CM

## 2018-03-20 DIAGNOSIS — N52.9 ERECTILE DYSFUNCTION, UNSPECIFIED ERECTILE DYSFUNCTION TYPE: ICD-10-CM

## 2018-03-20 DIAGNOSIS — Z80.42 FAMILY HISTORY OF PROSTATE CANCER: ICD-10-CM

## 2018-03-20 RX ORDER — SILDENAFIL 50 MG/1
TABLET, FILM COATED ORAL
Qty: 4 TAB | Refills: 6 | Status: SHIPPED | OUTPATIENT
Start: 2018-03-20 | End: 2019-02-11

## 2018-03-20 NOTE — PATIENT INSTRUCTIONS

## 2018-03-20 NOTE — MR AVS SNAPSHOT
[address] [place] [address] [address] [address] [phone] Patient: Wilder Gutierrez 
MRN: [id number] :1965 Visit Information Date & Time Provider Department Dept. Phone Encounter #  
 3/20/2018  8:10 AM Norberta Pallas, MD [facility] [phone] [id number] Follow-up Instructions Return in about 6 months (around 2018), or if symptoms worsen or fail to improve, for HTN, hyperlipidemia. Upcoming Health Maintenance Date Due COLONOSCOPY 2020 DTaP/Tdap/Td series (2 - Td) 10/4/2022 Allergies as of 3/20/2018  Review Complete On: 3/20/2018 By: Norberta Pallas, MD  
 No Known Allergies Current Immunizations  Reviewed on 10/14/2016 Name Date Influenza Vaccine (Madin Erna Canine Kidney) PF 11/3/2015 Influenza Vaccine Hemalatha Dellen) 10/14/2016 Influenza Vaccine (Quad) PF 2017, 2014 Influenza Vaccine PF 2013 TDAP Vaccine 10/4/2012 Not reviewed this visit You Were Diagnosed With   
  
 Codes Comments Essential hypertension    -  Primary ICD-10-CM: I10 
ICD-9-CM: 401.9 Overweight (BMI 25.0-29. 9)     ICD-10-CM: C80.1 ICD-9-CM: 278.02 Mixed hyperlipidemia     ICD-10-CM: E78.2 ICD-9-CM: 272.2 Right inguinal hernia     ICD-10-CM: K40.90 ICD-9-CM: 550.90 Family history of prostate cancer     ICD-10-CM: Z80.42 
ICD-9-CM: V16.42 Erectile dysfunction, unspecified erectile dysfunction type     ICD-10-CM: N52.9 ICD-9-CM: 607.84 Screening PSA (prostate specific antigen)     ICD-10-CM: Z12.5 ICD-9-CM: V76.44 Screening for diabetes mellitus     ICD-10-CM: Z13.1 ICD-9-CM: V77.1 Vitals BP Pulse Temp Resp Height(growth percentile) Weight(growth percentile) 117/72 (BP 1 Location: Left arm, BP Patient Position: Sitting) 64 98.3 °F (36.8 °C) (Oral) 16 5' 7\" (1.702 m) 178 lb (80.7 kg) SpO2 BMI Smoking Status 98% 27.88 kg/m2 Former Smoker BMI and BSA Data Body Mass Index Body Surface Area  
 27.88 kg/m 2 1.95 m 2 Preferred Pharmacy Pharmacy Name Phone [address] [phone] Your Updated Medication List  
  
   
This list is accurate as of 3/20/18  8:48 AM.  Always use your most recent med list.  
  
  
  
  
 aspirin delayed-release 81 mg tablet Take 81 mg by mouth daily. atorvastatin 10 mg tablet Commonly known as:  LIPITOR  
TAKE ONE TABLET BY MOUTH ONE TIME DAILY  
  
 hydroCHLOROthiazide 12.5 mg capsule Commonly known as:  MICROZIDE  
1 daily  
  
 ibuprofen 200 mg tablet Commonly known as:  MOTRIN Take 200 mg by mouth as needed. lisinopril 10 mg tablet Commonly known as:  Hermila Eligio Take 1 Tab by mouth daily. Indications: hypertension  
  
 metoprolol succinate 25 mg XL tablet Commonly known as:  TOPROL-XL Take 1 Tab by mouth nightly. multivitamin tablet Commonly known as:  ONE A DAY Take 1 Tab by mouth daily. sildenafil citrate 50 mg tablet Commonly known as:  VIAGRA Take 30 mins to 1 hr before planned intercourse. Prescriptions Sent to Pharmacy Refills  
 sildenafil citrate (VIAGRA) 50 mg tablet 6 Sig: Take 30 mins to 1 hr before planned intercourse. Class: Normal  
 Pharmacy: [address] Ph #: [phone] We Performed the Following CBC WITH AUTOMATED DIFF [35319 CPT(R)] HEMOGLOBIN A1C WITH EAG [00126 CPT(R)] LIPID PANEL [08004 CPT(R)] METABOLIC PANEL, COMPREHENSIVE [58946 CPT(R)] PSA SCREENING (SCREENING) [ [facility]] TSH RFX ON ABNORMAL TO FREE T4 [[id number] Custom] Follow-up Instructions Return in about 6 months (around 9/20/2018), or if symptoms worsen or fail to improve, for HTN, hyperlipidemia. Patient Instructions High Cholesterol: Care Instructions Your Care Instructions Cholesterol is a type of fat in your blood. It is needed for many body functions, such as making new cells. Cholesterol is made by your body. It also comes from food you eat. High cholesterol means that you have too much of the fat in your blood. This raises your risk of a heart attack and stroke. LDL and HDL are part of your total cholesterol. LDL is the \"bad\" cholesterol. High LDL can raise your risk for heart disease, heart attack, and stroke. HDL is the \"good\" cholesterol. It helps clear bad cholesterol from the body. High HDL is linked with a lower risk of heart disease, heart attack, and stroke. Your cholesterol levels help your doctor find out your risk for having a heart attack or stroke. You and your doctor can talk about whether you need to lower your risk and what treatment is best for you. A heart-healthy lifestyle along with medicines can help lower your cholesterol and your risk. The way you choose to lower your risk will depend on how high your risk is for heart attack and stroke. It will also depend on how you feel about taking medicines. Follow-up care is a key part of your treatment and safety. Be sure to make and go to all appointments, and call your doctor if you are having problems. It's also a good idea to know your test results and keep a list of the medicines you take. How can you care for yourself at home? · Eat a variety of foods every day. Good choices include fruits, vegetables, whole grains (like oatmeal), dried beans and peas, nuts and seeds, soy products (like tofu), and fat-free or low-fat dairy products. · Replace butter, margarine, and hydrogenated or partially hydrogenated oils with olive and canola oils. (Canola oil margarine without trans fat is fine.) · Replace red meat with fish, poultry, and soy protein (like tofu). · Limit processed and packaged foods like chips, crackers, and cookies. · Bake, broil, or steam foods. Don't mosley them. · Be physically active. Get at least 30 minutes of exercise on most days of the week. Walking is a good choice. You also may want to do other activities, such as running, swimming, cycling, or playing tennis or team sports. · Stay at a healthy weight or lose weight by making the changes in eating and physical activity listed above. Losing just a small amount of weight, even 5 to 10 pounds, can reduce your risk for having a heart attack or stroke. · Do not smoke. When should you call for help? Watch closely for changes in your health, and be sure to contact your doctor if: 
? · You need help making lifestyle changes. ? · You have questions about your medicine. Where can you learn more? Go to http://manuela-avtar.info/. Enter T073 in the search box to learn more about \"High Cholesterol: Care Instructions. \" Current as of: September 21, 2016 Content Version: 11.4 © 5998-8807 [employer]. Care instructions adapted under license by [employer] (which disclaims liability or warranty for this information). If you have questions about a medical condition or this instruction, always ask your healthcare professional. [address] any warranty or liability for your use of this information. Introducing [facility] & HEALTH SERVICES! Dear Tiera Wilkinson: 
Thank you for requesting a NewsBasis account. Our records indicate that you already have an active NewsBasis account. You can access your account anytime at [email or web address]/[employer] Did you know that you can access your hospital and ER discharge instructions at any time in NewsBasis? You can also review all of your test results from your hospital stay or ER visit. Additional Information If you have questions, please visit the Frequently Asked Questions section of the NewsBasis website at [email or web address] Remember, [employer] is NOT to be used for urgent needs. For medical emergencies, dial 911. Now available from your iPhone and Android! Please provide this summary of care documentation to your next provider. Your primary care clinician is listed as Gilberto Lara. If you have any questions after today's visit, please call [phone].

## 2018-03-20 NOTE — PROGRESS NOTES
Jhony Foots III  Identified pt with two pt identifiers(name and ). Chief Complaint   Patient presents with    Cholesterol Problem    Hypertension       1. Have you been to the ER, urgent care clinic since your last visit? Hospitalized since your last visit? went to [facility] for small hernia    2. Have you seen or consulted any other health care providers outside of the [address] [place] [address] since your last visit? Include any pap smears or colon screening. saw Dr Tonya Rivas )[facility] for hernia    Today's provider has been notified of reason for visit, vitals and flowsheets obtained on patients. Patient received paperwork for advance directive during previous visit but has not completed at this time     Reviewed record In preparation for visit, huddled with provider and have obtained necessary documentation      There are no preventive care reminders to display for this patient.     Wt Readings from Last 3 Encounters:   18 178 lb (80.7 kg)   18 180 lb (81.6 kg)   17 176 lb 9.6 oz (80.1 kg)     Temp Readings from Last 3 Encounters:   18 98.3 °F (36.8 °C) (Oral)   17 98.3 °F (36.8 °C) (Oral)   17 98 °F (36.7 °C) (Oral)     BP Readings from Last 3 Encounters:   18 117/72   18 134/88   17 138/88     Pulse Readings from Last 3 Encounters:   18 64   18 71   17 64     Vitals:    18 0818   BP: 117/72   Pulse: 64   Resp: 16   Temp: 98.3 °F (36.8 °C)   TempSrc: Oral   SpO2: 98%   Weight: 178 lb (80.7 kg)   Height: 5' 7\" (1.702 m)   PainSc:   0 - No pain         Learning Assessment:  :     Learning Assessment 2014   PRIMARY LEARNER Patient   HIGHEST LEVEL OF EDUCATION - PRIMARY LEARNER  4 YEARS OF COLLEGE   BARRIERS PRIMARY LEARNER NONE   CO-LEARNER CAREGIVER No   PRIMARY LANGUAGE ENGLISH   LEARNER PREFERENCE PRIMARY DEMONSTRATION   ANSWERED BY patient   RELATIONSHIP SELF       Depression Screening:  :     PHQ over the last two weeks 3/20/2018   Little interest or pleasure in doing things Not at all   Feeling down, depressed or hopeless Not at all   Total Score PHQ 2 0       Fall Risk Assessment:  :     Fall Risk Assessment, last 12 mths 10/14/2016   Able to walk? Yes   Fall in past 12 months? No       Abuse Screening:  :     No flowsheet data found. ADL Screening:  :     ADL Assessment 3/20/2018   Feeding yourself No Help Needed   Getting from bed to chair No Help Needed   Getting dressed No Help Needed   Bathing or showering No Help Needed   Walk across the room (includes cane/walker) No Help Needed   Using the telphone No Help Needed   Taking your medications No Help Needed   Preparing meals No Help Needed   Managing money (expenses/bills) No Help Needed   Moderately strenuous housework (laundry) No Help Needed   Shopping for personal items (toiletries/medicines) No Help Needed   Shopping for groceries No Help Needed   Driving No Help Needed   Climbing a flight of stairs No Help Needed   Getting to places beyond walking distances No Help Needed       Medication reconciliation up to date and corrected with patient at this time.

## 2018-03-20 NOTE — LETTER
3/27/2018 7:12 AM 
 
Mr. Stephy Goss III 
[address] 
[address] [address] Dear Stephy Goss III: 
 
Please find your most recent results below. Resulted Orders METABOLIC PANEL, COMPREHENSIVE Result Value Ref Range Glucose 98 65 - 99 mg/dL BUN 14 6 - 24 mg/dL Creatinine 1.20 0.76 - 1.27 mg/dL GFR est non-AA 69 >59 mL/min/1.73 GFR est AA 79 >59 mL/min/1.73  
 BUN/Creatinine ratio 12 9 - 20 Sodium 141 134 - 144 mmol/L Potassium 5.1 3.5 - 5.2 mmol/L Chloride 97 96 - 106 mmol/L  
 CO2 27 18 - 29 mmol/L Calcium 10.4 (H) 8.7 - 10.2 mg/dL Protein, total 7.5 6.0 - 8.5 g/dL Albumin 4.8 3.5 - 5.5 g/dL GLOBULIN, TOTAL 2.7 1.5 - 4.5 g/dL A-G Ratio 1.8 1.2 - 2.2 Bilirubin, total 1.0 0.0 - 1.2 mg/dL Alk. phosphatase 53 39 - 117 IU/L  
 AST (SGOT) 31 0 - 40 IU/L  
 ALT (SGPT) 28 0 - 44 IU/L Narrative Performed at:  [address]  [id number] : Gudelia Granda MD, Phone:  [phone] CBC WITH AUTOMATED DIFF Result Value Ref Range WBC 4.8 3.4 - 10.8 x10E3/uL  
 RBC 5.24 4.14 - 5.80 x10E6/uL HGB 13.9 13.0 - 17.7 g/dL HCT 42.1 37.5 - 51.0 % MCV 80 79 - 97 fL  
 MCH 26.5 (L) 26.6 - 33.0 pg  
 MCHC 33.0 31.5 - 35.7 g/dL  
 RDW 14.3 12.3 - 15.4 % PLATELET 052 802 - 923 x10E3/uL NEUTROPHILS 52 Not Estab. % Lymphocytes 36 Not Estab. % MONOCYTES 10 Not Estab. % EOSINOPHILS 2 Not Estab. % BASOPHILS 0 Not Estab. %  
 ABS. NEUTROPHILS 2.5 1.4 - 7.0 x10E3/uL Abs Lymphocytes 1.8 0.7 - 3.1 x10E3/uL  
 ABS. MONOCYTES 0.5 0.1 - 0.9 x10E3/uL  
 ABS. EOSINOPHILS 0.1 0.0 - 0.4 x10E3/uL  
 ABS. BASOPHILS 0.0 0.0 - 0.2 x10E3/uL IMMATURE GRANULOCYTES 0 Not Estab. %  
 ABS. IMM. GRANS. 0.0 0.0 - 0.1 x10E3/uL Narrative Performed at:  [address]  [id number] : Gudelia Granda MD, Phone:  [phone] HEMOGLOBIN A1C WITH EAG  
 Result Value Ref Range Hemoglobin A1c 5.5 4.8 - 5.6 % Comment:  
            Pre-diabetes: 5.7 - 6.4 Diabetes: >6.4 Glycemic control for adults with diabetes: <7.0 Estimated average glucose 111 mg/dL Narrative Performed at:  [address]  [id number] : Deloris Brito MD, Phone:  [phone] TSH RFX ON ABNORMAL TO FREE T4 Result Value Ref Range TSH 0.758 0.450 - 4.500 uIU/mL Narrative Performed at:  [address]  [id number] : Deloris Brito MD, Phone:  [phone] LIPID PANEL Result Value Ref Range Cholesterol, total 216 (H) 100 - 199 mg/dL Triglyceride 47 0 - 149 mg/dL HDL Cholesterol 123 >39 mg/dL VLDL, calculated 9 5 - 40 mg/dL LDL, calculated 84 0 - 99 mg/dL Narrative Performed at:  [address]  [id number] : Deloris Brito MD, Phone:  [phone] PSA SCREENING (SCREENING) Result Value Ref Range Prostate Specific Ag 1.2 0.0 - 4.0 ng/mL Comment:  
   Roche ECLIA methodology. According to the American Urological Association, Serum PSA should 
decrease and remain at undetectable levels after radical 
prostatectomy. The AUA defines biochemical recurrence as an initial 
PSA value 0.2 ng/mL or greater followed by a subsequent confirmatory PSA value 0.2 ng/mL or greater. Values obtained with different assay methods or kits cannot be used 
interchangeably. Results cannot be interpreted as absolute evidence 
of the presence or absence of malignant disease. Narrative Performed at:  [address]  [id number] : Deloris Brito MD, Phone:  [phone] CVD REPORT Result Value Ref Range INTERPRETATION Note Comment:  
   Supplemental report is available. Narrative Performed at:  [address] [place] [address]  [id number] : Meredith Garcia PhD, Phone:  [phone] RECOMMENDATIONS: 
Your labs showed normal kidney and liver tests, blood counts, thyroid, diabetes screening test, cholesterol, and PSA. Your calcium level was a little high. This is nothing to worry about. Dr. Yane Pina will recheck it in 6-12 months. Your total cholesterol was a little high but is also nothing to worry about since the cholesterol is high due to a high HDL, or good cholesterol, level. Please call me if you have any questions: [phone] Sincerely, Ernie Colon LPN

## 2018-03-20 NOTE — PROGRESS NOTES
CC:   Chief Complaint   Patient presents with    Cholesterol Problem    Hypertension       HISTORY OF PRESENT ILLNESS  Candis Virk III is a 47 y.o. male. Presents for 6 month follow up evaluation. He has HTN, hyperlipidemia, prediabetes, heart palpitations, allergic rhinitis, family history of prostate cancer, and family history of colon cancer. He has no complaints today. Reports he had groin pain in Jan.  Was seen at [facility]; referred to Dr. Cast Living [facility]); found to have small right inguinal hernia. Saw Dr. Jose Wayne. No surgery needed.      Reports compliance with low-salt, low-fat diet and medications. Gets sporadic exercise; not as much as before. Has infrequent asymptomatic heart palpitations. Saw Dr. Celestina Botello in 10/16 about heart palpitations. Stress test and echo were normal.  24 hr Holter monitor showed episodes of sinus tachycardia that correlated with his symptoms. Was started on metoprolol. Heart palpitations less frequent and less severe now.      Soc Hx  . Has 2 children ages 11 and 6. He works as the [employer]. Job is stressful. Former smoker; quit in 1997 (smoked 1/2 ppd for 2 yrs). Drinks 1/2 to 1 glass of wine with dinner. Denies recreational drug use. Gets occasional exercise by playing basket      Health Maintenance  Flu vaccine: 9/19/17                                                           Tetanus vaccine: Tdap 10/4/12                                    Colonoscopy: 6/12/15 (diverticulosis, grade 1 internal hemorrhoids); next due in 10 yrs (2020)  Eye exam: Fall 2015 ([facility])                  ROS  A complete review of systems was performed and is negative except for those mentioned in the HPI.      Patient Active Problem List   Diagnosis Code    Family history of colon cancer Z80.0    Family history of prostate cancer Z80.42    Palpitations R00.2    Low back pain M54.5    Essential hypertension with goal blood pressure less than 140/90 I10    Hyperlipidemia E78.5    Allergic rhinitis J30.9    Overweight (BMI 25.0-29. 9) E66.3    Right inguinal hernia K40.90     Past Medical History:   Diagnosis Date    Allergic rhinitis     Family history of colon cancer     Family history of prostate cancer     Heart palpitations 2011    Event monitor: occ PVC's. Echo nl. Eval by Dr. Liza Hernandez Hypertension     Lumbar back pain      No Known Allergies    Current Outpatient Prescriptions   Medication Sig Dispense Refill    hydroCHLOROthiazide (MICROZIDE) 12.5 mg capsule 1 daily 90 Cap 3    atorvastatin (LIPITOR) 10 mg tablet TAKE ONE TABLET BY MOUTH ONE TIME DAILY  90 Tab 0    metoprolol succinate (TOPROL-XL) 25 mg XL tablet Take 1 Tab by mouth nightly. 90 Tab 3    lisinopril (PRINIVIL, ZESTRIL) 10 mg tablet Take 1 Tab by mouth daily. Indications: hypertension 90 Tab 3    sildenafil citrate (VIAGRA) 50 mg tablet Take 30 mins to 1 hr before planned intercourse. 4 Tab 0    multivitamin (ONE A DAY) tablet Take 1 Tab by mouth daily.  ibuprofen (MOTRIN) 200 mg tablet Take 200 mg by mouth as needed.  aspirin delayed-release 81 mg tablet Take 81 mg by mouth daily. PHYSICAL EXAM  Visit Vitals    /72 (BP 1 Location: Left arm, BP Patient Position: Sitting)    Pulse 64    Temp 98.3 °F (36.8 °C) (Oral)    Resp 16    Ht 5' 7\" (1.702 m)    Wt 178 lb (80.7 kg)    SpO2 98%    BMI 27.88 kg/m2       General: Well-developed and well-nourished, no distress. HEENT:  Head normocephalic/atraumatic, no scleral icterus  Neck: Supple. No carotid bruits, JVD, lymphadenopathy, or thyromegaly. Lungs:  Clear to ausculation bilaterally. Good air movement. Heart:  Regular rate and rhythm, normal S1 and S2, no murmur, gallop, or rub  Abdomen: Soft, non-distended, normal bowel sounds, no tenderness, no guarding, masses, rebound tenderness, or HSM. Extremities: No clubbing, cyanosis, or edema.    Neurological: Alert and oriented. Psychiatric: Normal mood and affect. Behavior is normal.     Results for orders placed or performed in visit on 09/19/17   LIPID PANEL   Result Value Ref Range    Cholesterol, total 205 (H) 100 - 199 mg/dL    Triglyceride 42 0 - 149 mg/dL    HDL Cholesterol 133 >39 mg/dL    VLDL, calculated 8 5 - 40 mg/dL    LDL, calculated 64 0 - 99 mg/dL   CVD REPORT   Result Value Ref Range    INTERPRETATION Note          ASSESSMENT AND PLAN    ICD-10-CM ICD-9-CM    1. Essential hypertension D32 685.7 METABOLIC PANEL, COMPREHENSIVE      CBC WITH AUTOMATED DIFF   2. Overweight (BMI 25.0-29. 9) E66.3 278.02    3. Mixed hyperlipidemia E78.2 272.2 TSH RFX ON ABNORMAL TO FREE T4      LIPID PANEL   4. Right inguinal hernia K40.90 550.90    5. Family history of prostate cancer Z80.42 V16.42    6. Erectile dysfunction, unspecified erectile dysfunction type N52.9 607.84 sildenafil citrate (VIAGRA) 50 mg tablet   7. Screening PSA (prostate specific antigen) Z12.5 V76.44 PSA SCREENING (SCREENING)   8. Screening for diabetes mellitus Z13.1 V77.1 HEMOGLOBIN A1C WITH EAG       Diagnoses and all orders for this visit:    1. Essential hypertension  Well-controlled. Continue present management. Metoprolol helps palpitations.  -     METABOLIC PANEL, COMPREHENSIVE  -     CBC WITH AUTOMATED DIFF    2. Overweight (BMI 25.0-29. 9)  Discussed the patient's BMI with him. The BMI follow up plan is as follows: dietary management education, guidance, and counseling; encourage exercise; monitor weight; prescribed dietary intake. Follow up BMI in 6 months. 3. Mixed hyperlipidemia  Continue atorvastatin 10 mg daily.  -     TSH RFX ON ABNORMAL TO FREE T4  -     LIPID PANEL    4. Right inguinal hernia  Patient will monitor. 5. Family history of prostate cancer    6. Erectile dysfunction, unspecified erectile dysfunction type  -     Refill sildenafil citrate (VIAGRA) 50 mg tablet; Take 30 mins to 1 hr before planned intercourse.     7. Screening PSA (prostate specific antigen)  -     PSA SCREENING (SCREENING)    8. Screening for diabetes mellitus  -     HEMOGLOBIN A1C WITH EAG      Follow-up Disposition:  Return in about 6 months (around 9/20/2018), or if symptoms worsen or fail to improve, for HTN, hyperlipidemia. Provided patient and/or family with advanced directive information and answered pertinent questions. Encouraged patient to provide a copy of advanced directive to the office when available. I have discussed the diagnosis with the patient and the intended plan as seen in the above orders. Patient is in agreement. The patient has received an after-visit summary and questions were answered concerning future plans. I have discussed medication side effects and warnings with the patient as well.

## 2018-03-21 LAB
ALBUMIN SERPL-MCNC: 4.8 G/DL (ref 3.5–5.5)
ALBUMIN/GLOB SERPL: 1.8 {RATIO} (ref 1.2–2.2)
ALP SERPL-CCNC: 53 IU/L (ref 39–117)
ALT SERPL-CCNC: 28 IU/L (ref 0–44)
AST SERPL-CCNC: 31 IU/L (ref 0–40)
BASOPHILS # BLD AUTO: 0 X10E3/UL (ref 0–0.2)
BASOPHILS NFR BLD AUTO: 0 %
BILIRUB SERPL-MCNC: 1 MG/DL (ref 0–1.2)
BUN SERPL-MCNC: 14 MG/DL (ref 6–24)
BUN/CREAT SERPL: 12 (ref 9–20)
CALCIUM SERPL-MCNC: 10.4 MG/DL (ref 8.7–10.2)
CHLORIDE SERPL-SCNC: 97 MMOL/L (ref 96–106)
CHOLEST SERPL-MCNC: 216 MG/DL (ref 100–199)
CO2 SERPL-SCNC: 27 MMOL/L (ref 18–29)
CREAT SERPL-MCNC: 1.2 MG/DL (ref 0.76–1.27)
EOSINOPHIL # BLD AUTO: 0.1 X10E3/UL (ref 0–0.4)
EOSINOPHIL NFR BLD AUTO: 2 %
ERYTHROCYTE [DISTWIDTH] IN BLOOD BY AUTOMATED COUNT: 14.3 % (ref 12.3–15.4)
EST. AVERAGE GLUCOSE BLD GHB EST-MCNC: 111 MG/DL
GFR SERPLBLD CREATININE-BSD FMLA CKD-EPI: 69 ML/MIN/1.73
GFR SERPLBLD CREATININE-BSD FMLA CKD-EPI: 79 ML/MIN/1.73
GLOBULIN SER CALC-MCNC: 2.7 G/DL (ref 1.5–4.5)
GLUCOSE SERPL-MCNC: 98 MG/DL (ref 65–99)
HBA1C MFR BLD: 5.5 % (ref 4.8–5.6)
HCT VFR BLD AUTO: 42.1 % (ref 37.5–51)
HDLC SERPL-MCNC: 123 MG/DL
HGB BLD-MCNC: 13.9 G/DL (ref 13–17.7)
IMM GRANULOCYTES # BLD: 0 X10E3/UL (ref 0–0.1)
IMM GRANULOCYTES NFR BLD: 0 %
INTERPRETATION, 910389: NORMAL
LDLC SERPL CALC-MCNC: 84 MG/DL (ref 0–99)
LYMPHOCYTES # BLD AUTO: 1.8 X10E3/UL (ref 0.7–3.1)
LYMPHOCYTES NFR BLD AUTO: 36 %
MCH RBC QN AUTO: 26.5 PG (ref 26.6–33)
MCHC RBC AUTO-ENTMCNC: 33 G/DL (ref 31.5–35.7)
MCV RBC AUTO: 80 FL (ref 79–97)
MONOCYTES # BLD AUTO: 0.5 X10E3/UL (ref 0.1–0.9)
MONOCYTES NFR BLD AUTO: 10 %
NEUTROPHILS # BLD AUTO: 2.5 X10E3/UL (ref 1.4–7)
NEUTROPHILS NFR BLD AUTO: 52 %
PLATELET # BLD AUTO: 323 X10E3/UL (ref 150–379)
POTASSIUM SERPL-SCNC: 5.1 MMOL/L (ref 3.5–5.2)
PROT SERPL-MCNC: 7.5 G/DL (ref 6–8.5)
PSA SERPL-MCNC: 1.2 NG/ML (ref 0–4)
RBC # BLD AUTO: 5.24 X10E6/UL (ref 4.14–5.8)
SODIUM SERPL-SCNC: 141 MMOL/L (ref 134–144)
TRIGL SERPL-MCNC: 47 MG/DL (ref 0–149)
TSH SERPL DL<=0.005 MIU/L-ACNC: 0.76 UIU/ML (ref 0.45–4.5)
VLDLC SERPL CALC-MCNC: 9 MG/DL (ref 5–40)
WBC # BLD AUTO: 4.8 X10E3/UL (ref 3.4–10.8)

## 2018-03-21 NOTE — PROGRESS NOTES
Your labs showed normal kidney and liver tests, blood counts, thyroid, diabetes screening test, cholesterol, and PSA. Your calcium level was a little high. This is nothing to worry about. Dr. Juan Miguel Marcos will recheck it in 6-12 months. Your total cholesterol was a little high but is also nothing to worry about since the cholesterol is high due to a high HDL, or good cholesterol, level.

## 2018-08-15 DIAGNOSIS — I10 ESSENTIAL HYPERTENSION WITH GOAL BLOOD PRESSURE LESS THAN 140/90: ICD-10-CM

## 2018-08-15 RX ORDER — LISINOPRIL 10 MG/1
10 TABLET ORAL DAILY
Qty: 90 TAB | Refills: 3 | Status: SHIPPED | OUTPATIENT
Start: 2018-08-15 | End: 2019-02-28 | Stop reason: SDUPTHER

## 2018-08-15 NOTE — TELEPHONE ENCOUNTER
REFILL     PCP: Dwayne Cain MD     Last appt: Visit date not found   Future Appointments  Date Time Provider Josh Paz   9/18/2018 8:10 AM Dwayne Cain  [facility]        Requested Prescriptions     Pending Prescriptions Disp Refills    lisinopril (PRINIVIL, ZESTRIL) 10 mg tablet 90 Tab 3     Sig: Take 1 Tab by mouth daily.  Indications: hypertension

## 2018-08-29 RX ORDER — METOPROLOL SUCCINATE 25 MG/1
25 TABLET, EXTENDED RELEASE ORAL
Qty: 90 TAB | Refills: 3 | Status: SHIPPED | OUTPATIENT
Start: 2018-08-29 | End: 2019-02-11

## 2018-09-18 ENCOUNTER — OFFICE VISIT (OUTPATIENT)
Dept: INTERNAL MEDICINE CLINIC | Facility: CLINIC | Age: 53
End: 2018-09-18

## 2018-09-18 VITALS
WEIGHT: 179 LBS | HEART RATE: 75 BPM | DIASTOLIC BLOOD PRESSURE: 79 MMHG | RESPIRATION RATE: 18 BRPM | SYSTOLIC BLOOD PRESSURE: 119 MMHG | HEIGHT: 67 IN | TEMPERATURE: 98 F | BODY MASS INDEX: 28.09 KG/M2 | OXYGEN SATURATION: 98 %

## 2018-09-18 DIAGNOSIS — Z13.1 SCREENING FOR DIABETES MELLITUS: ICD-10-CM

## 2018-09-18 DIAGNOSIS — I10 ESSENTIAL HYPERTENSION: Primary | ICD-10-CM

## 2018-09-18 DIAGNOSIS — Z23 ENCOUNTER FOR IMMUNIZATION: ICD-10-CM

## 2018-09-18 DIAGNOSIS — M25.532 LEFT WRIST PAIN: ICD-10-CM

## 2018-09-18 DIAGNOSIS — Z12.5 SCREENING FOR PROSTATE CANCER: ICD-10-CM

## 2018-09-18 DIAGNOSIS — E66.3 OVERWEIGHT (BMI 25.0-29.9): ICD-10-CM

## 2018-09-18 DIAGNOSIS — E78.2 MIXED HYPERLIPIDEMIA: ICD-10-CM

## 2018-09-18 DIAGNOSIS — E83.52 HYPERCALCEMIA: ICD-10-CM

## 2018-09-18 DIAGNOSIS — M54.2 POSTERIOR NECK PAIN: ICD-10-CM

## 2018-09-18 NOTE — PROGRESS NOTES
Washington Mirza III  Identified pt with two pt identifiers(name and ). Chief Complaint Patient presents with  Hypertension  Immunization/Injection  Neck Pain  Wrist Pain  
  left 1. Have you been to the ER, urgent care clinic since your last visit? Hospitalized since your last visit? Seen at [facility]  
 
2. Have you seen or consulted any other health care providers outside of the [address] [place] [address] since your last visit? Include any pap smears or colon screening. NO Today's provider has been notified of reason for visit, vitals and flowsheets obtained on patients. Patient received paperwork for advance directive during previous visit but has not completed at this time Reviewed record In preparation for visit, huddled with provider and have obtained necessary documentation Health Maintenance Due Topic  Influenza Age 5 to Adult Wt Readings from Last 3 Encounters:  
18 179 lb (81.2 kg) 18 178 lb (80.7 kg) 18 180 lb (81.6 kg) Temp Readings from Last 3 Encounters:  
18 98.3 °F (36.8 °C) (Oral) 17 98.3 °F (36.8 °C) (Oral) 17 98 °F (36.7 °C) (Oral) BP Readings from Last 3 Encounters:  
18 117/72  
18 134/88  
17 138/88 Pulse Readings from Last 3 Encounters:  
18 64  
18 71  
17 64 Vitals:  
 18 3269 Resp: 18 SpO2: 98% Weight: 179 lb (81.2 kg) Height: 5' 7\" (1.702 m) Learning Assessment: 
:  
 
Learning Assessment 2014 PRIMARY LEARNER Patient HIGHEST LEVEL OF EDUCATION - PRIMARY LEARNER  4 YEARS OF COLLEGE  
BARRIERS PRIMARY LEARNER NONE  
CO-LEARNER CAREGIVER No  
PRIMARY LANGUAGE ENGLISH  
LEARNER PREFERENCE PRIMARY DEMONSTRATION  
ANSWERED BY patient RELATIONSHIP SELF Depression Screening: 
:  
 
PHQ over the last two weeks 2018 Little interest or pleasure in doing things Not at all Feeling down, depressed, irritable, or hopeless Not at all Total Score PHQ 2 0 Fall Risk Assessment: 
:  
 
Fall Risk Assessment, last 12 mths 10/14/2016 Able to walk? Yes Fall in past 12 months? No  
 
 
Abuse Screening: 
:  
 
No flowsheet data found. ADL Screening: 
:  
 
ADL Assessment 3/20/2018 Feeding yourself No Help Needed Getting from bed to chair No Help Needed Getting dressed No Help Needed Bathing or showering No Help Needed Walk across the room (includes cane/walker) No Help Needed Using the telphone No Help Needed Taking your medications No Help Needed Preparing meals No Help Needed Managing money (expenses/bills) No Help Needed Moderately strenuous housework (laundry) No Help Needed Shopping for personal items (toiletries/medicines) No Help Needed Shopping for groceries No Help Needed Driving No Help Needed Climbing a flight of stairs No Help Needed Getting to places beyond walking distances No Help Needed Medication reconciliation up to date and corrected with patient at this time.

## 2018-09-18 NOTE — MR AVS SNAPSHOT
[address] [address] [phone] Patient: Abril Fletcher 
MRN: [id number] :1965 Visit Information Date & Time Provider Department Dept. Phone Encounter #  
 2018  8:10 AM Michelle Martinez MD [facility] Internal Medicine of [address] [place] [address] [id number] Your Appointments 10/4/2018  3:30 PM  
ESTABLISHED PATIENT with Timur Rivas MD  
[facility] Appt Note: Medication renewal /1 year follow up [address] [facility]  
[phone] [address] [facility] Upcoming Health Maintenance Date Due Influenza Age 5 to Adult 2018 COLONOSCOPY 2020 DTaP/Tdap/Td series (2 - Td) 10/4/2022 Allergies as of 2018  Review Complete On: 2018 By: Michelle Martinez MD  
 No Known Allergies Current Immunizations  Reviewed on 10/14/2016 Name Date Influenza Vaccine (Madin Purling Canine Kidney) PF 11/3/2015 Influenza Vaccine Arletha Tere) 10/14/2016 Influenza Vaccine (Quad) PF  Incomplete, 2017, 2014 Influenza Vaccine PF 2013 TDAP Vaccine 10/4/2012 Not reviewed this visit You Were Diagnosed With   
  
 Codes Comments Essential hypertension    -  Primary ICD-10-CM: I10 
ICD-9-CM: 401.9 Mixed hyperlipidemia     ICD-10-CM: E78.2 ICD-9-CM: 272.2 Posterior neck pain     ICD-10-CM: M54.2 ICD-9-CM: 723.1 Left wrist pain     ICD-10-CM: M25.532 ICD-9-CM: 719.43 Hypercalcemia     ICD-10-CM: Q28.99 
ICD-9-CM: 275.42 Overweight (BMI 25.0-29. 9)     ICD-10-CM: G70.2 ICD-9-CM: 278.02 Encounter for immunization     ICD-10-CM: X59 ICD-9-CM: V03.89 Vitals BP Pulse Temp Resp Height(growth percentile) Weight(growth percentile)  119/79 (BP 1 Location: Left arm, BP Patient Position: Sitting) 75 98 °F (36.7 °C) (Oral) 18 5' 7\" (1.702 m) 179 lb (81.2 kg) SpO2 BMI Smoking Status 98% 28.04 kg/m2 Former Smoker Vitals History BMI and BSA Data Body Mass Index Body Surface Area 28.04 kg/m 2 1.96 m 2 Preferred Pharmacy Pharmacy Name Phone FOOD [facility] [address] [facility], [address] [phone] Your Updated Medication List  
  
   
This list is accurate as of 9/18/18  8:41 AM.  Always use your most recent med list.  
  
  
  
  
 aspirin delayed-release 81 mg tablet Take 81 mg by mouth daily. atorvastatin 10 mg tablet Commonly known as:  LIPITOR  
TAKE 1 TABLET BY MOUTH ONE TIME DAILY  
  
 hydroCHLOROthiazide 12.5 mg capsule Commonly known as:  MICROZIDE  
1 daily  
  
 ibuprofen 200 mg tablet Commonly known as:  MOTRIN Take 200 mg by mouth as needed. lisinopril 10 mg tablet Commonly known as:  Mollie Ripa Take 1 Tab by mouth daily. Indications: hypertension  
  
 metoprolol succinate 25 mg XL tablet Commonly known as:  TOPROL-XL Take 1 Tab by mouth nightly. multivitamin tablet Commonly known as:  ONE A DAY Take 1 Tab by mouth daily. sildenafil citrate 50 mg tablet Commonly known as:  VIAGRA Take 30 mins to 1 hr before planned intercourse. We Performed the Following INFLUENZA VIRUS VAC QUAD,SPLIT,PRESV FREE SYRINGE IM [id number] CPT(R)] METABOLIC PANEL, BASIC [10642 CPT(R)] WV IMMUNIZ ADMIN,1 SINGLE/COMB VAC/TOXOID [id number] CPT(R)] PTH INTACT [98407 CPT(R)] Patient Instructions Vaccine Information Statement Influenza (Flu) Vaccine (Inactivated or Recombinant): What you need to know Many Vaccine Information Statements are available in Greenlandic and other languages. See www.immunize.org/vis Hojas de Información Sobre Vacunas están disponibles en Español y en muchos otros idiomas. Visite www.immunize.org/vis 1. Why get vaccinated? Influenza (flu) is a contagious disease that spreads around the [facility] every year, usually between October and May. Flu is caused by influenza viruses, and is spread mainly by coughing, sneezing, and close contact. Anyone can get flu. Flu strikes suddenly and can last several days. Symptoms vary by age, but can include: 
 fever/chills  sore throat  muscle aches  fatigue  cough  headache  runny or stuffy nose Flu can also lead to pneumonia and blood infections, and cause diarrhea and seizures in children. If you have a medical condition, such as heart or lung disease, flu can make it worse. Flu is more dangerous for some people. Infants and young children, people 72years of age and older, pregnant women, and people with certain health conditions or a weakened immune system are at greatest risk. Each year thousands of people in the [facility] die from flu, and many more are hospitalized. Flu vaccine can: 
 keep you from getting flu, 
 make flu less severe if you do get it, and 
 keep you from spreading flu to your family and other people. 2. Inactivated and recombinant flu vaccines A dose of flu vaccine is recommended every flu season. Children 6 months through 6years of age may need two doses during the same flu season. Everyone else needs only one dose each flu season. Some inactivated flu vaccines contain a very small amount of a mercury-based preservative called thimerosal. Studies have not shown thimerosal in vaccines to be harmful, but flu vaccines that do not contain thimerosal are available. There is no live flu virus in flu shots. They cannot cause the flu. There are many flu viruses, and they are always changing. Each year a new flu vaccine is made to protect against three or four viruses that are likely to cause disease in the upcoming flu season.  But even when the vaccine doesnt exactly match these viruses, it may still provide some protection Flu vaccine cannot prevent: 
 flu that is caused by a virus not covered by the vaccine, or 
 illnesses that look like flu but are not. It takes about 2 weeks for protection to develop after vaccination, and protection lasts through the flu season. 3. Some people should not get this vaccine Tell the person who is giving you the vaccine:  If you have any severe, life-threatening allergies. If you ever had a life-threatening allergic reaction after a dose of flu vaccine, or have a severe allergy to any part of this vaccine, you may be advised not to get vaccinated. Most, but not all, types of flu vaccine contain a small amount of egg protein.  If you ever had Guillain-Barré Syndrome (also called GBS). Some people with a history of GBS should not get this vaccine. This should be discussed with your doctor.  If you are not feeling well. It is usually okay to get flu vaccine when you have a mild illness, but you might be asked to come back when you feel better. 4. Risks of a vaccine reaction With any medicine, including vaccines, there is a chance of reactions. These are usually mild and go away on their own, but serious reactions are also possible. Most people who get a flu shot do not have any problems with it. Minor problems following a flu shot include:  
 soreness, redness, or swelling where the shot was given  hoarseness  sore, red or itchy eyes  cough  fever  aches  headache  itching  fatigue If these problems occur, they usually begin soon after the shot and last 1 or 2 days. More serious problems following a flu shot can include the following:  There may be a small increased risk of Guillain-Barré Syndrome (GBS) after inactivated flu vaccine.   This risk has been estimated at 1 or 2 additional cases per million people vaccinated. This is much lower than the risk of severe complications from flu, which can be prevented by flu vaccine.  Young children who get the flu shot along with pneumococcal vaccine (PCV13) and/or DTaP vaccine at the same time might be slightly more likely to have a seizure caused by fever. Ask your doctor for more information. Tell your doctor if a child who is getting flu vaccine has ever had a seizure. Problems that could happen after any injected vaccine:  People sometimes faint after a medical procedure, including vaccination. Sitting or lying down for about 15 minutes can help prevent fainting, and injuries caused by a fall. Tell your doctor if you feel dizzy, or have vision changes or ringing in the ears.  Some people get severe pain in the shoulder and have difficulty moving the arm where a shot was given. This happens very rarely.  Any medication can cause a severe allergic reaction. Such reactions from a vaccine are very rare, estimated at about 1 in a million doses, and would happen within a few minutes to a few hours after the vaccination. As with any medicine, there is a very remote chance of a vaccine causing a serious injury or death. The safety of vaccines is always being monitored. For more information, visit: www.cdc.gov/vaccinesafety/ 
 
5. What if there is a serious reaction? What should I look for?  Look for anything that concerns you, such as signs of a severe allergic reaction, very high fever, or unusual behavior. Signs of a severe allergic reaction can include hives, swelling of the face and throat, difficulty breathing, a fast heartbeat, dizziness, and weakness  usually within a few minutes to a few hours after the vaccination. What should I do?  
 
 If you think it is a severe allergic reaction or other emergency that cant wait, call 9-1-1 and get the person to the nearest hospital. Otherwise, call your doctor.  Reactions should be reported to the Vaccine Adverse Event Reporting System (VAERS). Your doctor should file this report, or you can do it yourself through  the VAERS web site at www.vaers. hhs.gov, or by calling 8-631.686.6613. VAERS does not give medical advice. 6. The National Vaccine Injury Compensation Program 
 
The [facility] Vaccine Injury Compensation Program (VICP) is a federal program that was created to compensate people who may have been injured by certain vaccines. Persons who believe they may have been injured by a vaccine can learn about the program and about filing a claim by calling 6-878.346.4145 or visiting the [employer] website at www.[facility].gov/vaccinecompensation. There is a time limit to file a claim for compensation. 7. How can I learn more?  Ask your healthcare provider. He or she can give you the vaccine package insert or suggest other sources of information.  Call your local or state health department.  Contact the Centers for Disease Control and Prevention (CDC): 
- Call 7-814.282.4608 (1-800-CDC-INFO) or 
- Visit CDCs website at www.cdc.gov/flu Vaccine Information Statement Inactivated Influenza Vaccine 8/7/2015 
[address] PORTER Byrnes [id number]-88 Department of [facility] and App in the [employer] Centers for Disease Control and Prevention Office Use Only Neck Pain: Care Instructions Your Care Instructions You can have neck pain anywhere from the bottom of your head to the top of your shoulders. It can spread to the upper back or arms. Injuries, painting a ceiling, sleeping with your neck twisted, staying in one position for too long, and many other activities can cause neck pain. Most neck pain gets better with home care. Your doctor may recommend medicine to relieve pain or relax your muscles. He or she may suggest exercise and physical therapy to increase flexibility and relieve stress. You may need to wear a special (cervical) collar to support your neck for a day or two. Follow-up care is a key part of your treatment and safety. Be sure to make and go to all appointments, and call your doctor if you are having problems. It's also a good idea to know your test results and keep a list of the medicines you take. How can you care for yourself at home? · Try using a heating pad on a low or medium setting for 15 to 20 minutes every 2 or 3 hours. Try a warm shower in place of one session with the heating pad. · You can also try an ice pack for 10 to 15 minutes every 2 to 3 hours. Put a thin cloth between the ice and your skin. · Take pain medicines exactly as directed. ¨ If the doctor gave you a prescription medicine for pain, take it as prescribed. ¨ If you are not taking a prescription pain medicine, ask your doctor if you can take an over-the-counter medicine. · If your doctor recommends a cervical collar, wear it exactly as directed. When should you call for help? Call your doctor now or seek immediate medical care if: 
  · You have new or worsening numbness in your arms, buttocks or legs.  
  · You have new or worsening weakness in your arms or legs. (This could make it hard to stand up.)  
  · You lose control of your bladder or bowels.  
 Watch closely for changes in your health, and be sure to contact your doctor if: 
  · Your neck pain is getting worse.  
  · You are not getting better after 1 week.  
  · You do not get better as expected. Where can you learn more? Go to http://manuela-avtar.info/. Enter [id number] in the search box to learn more about \"Neck Pain: Care Instructions. \" Current as of: November 29, 2017 Content Version: 11.7 © 3698-9342 [employer]. Care instructions adapted under license by [employer] (which disclaims liability or warranty for this information).  If you have questions about a medical condition or this instruction, always ask your healthcare professional. [address] any warranty or liability for your use of this information. Introducing [facility]! Dear Cesar Corrales: 
Thank you for requesting a rate[employer]ius account. Our records indicate that you already have an active rateGenius account. You can access your account anytime at [email or web address] Did you know that you can access your hospital and ER discharge instructions at any time in rateGenius? You can also review all of your test results from your hospital stay or ER visit. Additional Information If you have questions, please visit the Frequently Asked Questions section of the rate[employer]ius website at [email or web address] Remember, rateGenius is NOT to be used for urgent needs. For medical emergencies, dial 911. Now available from your iPhone and Android! Please provide this summary of care documentation to your next provider. Your primary care clinician is listed as Vitor Prather. If you have any questions after today's visit, please call [phone].

## 2018-09-18 NOTE — PATIENT INSTRUCTIONS
Vaccine Information Statement Influenza (Flu) Vaccine (Inactivated or Recombinant): What you need to know Many Vaccine Information Statements are available in Malay and other languages. See www.immunize.org/vis Hojas de Información Sobre Vacunas están disponibles en Español y en muchos otros idiomas. Visite www.immunize.org/vis 1. Why get vaccinated? Influenza (flu) is a contagious disease that spreads around the [place] every year, usually between October and May. Flu is caused by influenza viruses, and is spread mainly by coughing, sneezing, and close contact. Anyone can get flu. Flu strikes suddenly and can last several days. Symptoms vary by age, but can include: 
 fever/chills  sore throat  muscle aches  fatigue  cough  headache  runny or stuffy nose Flu can also lead to pneumonia and blood infections, and cause diarrhea and seizures in children. If you have a medical condition, such as heart or lung disease, flu can make it worse. Flu is more dangerous for some people. Infants and young children, people 72years of age and older, pregnant women, and people with certain health conditions or a weakened immune system are at greatest risk. Each year thousands of people in the [facility] die from flu, and many more are hospitalized. Flu vaccine can: 
 keep you from getting flu, 
 make flu less severe if you do get it, and 
 keep you from spreading flu to your family and other people. 2. Inactivated and recombinant flu vaccines A dose of flu vaccine is recommended every flu season. Children 6 months through 6years of age may need two doses during the same flu season. Everyone else needs only one dose each flu season.   
 
 
Some inactivated flu vaccines contain a very small amount of a mercury-based preservative called thimerosal. Studies have not shown thimerosal in vaccines to be harmful, but flu vaccines that do not contain thimerosal are available. There is no live flu virus in flu shots. They cannot cause the flu. There are many flu viruses, and they are always changing. Each year a new flu vaccine is made to protect against three or four viruses that are likely to cause disease in the upcoming flu season. But even when the vaccine doesnt exactly match these viruses, it may still provide some protection Flu vaccine cannot prevent: 
 flu that is caused by a virus not covered by the vaccine, or 
 illnesses that look like flu but are not. It takes about 2 weeks for protection to develop after vaccination, and protection lasts through the flu season. 3. Some people should not get this vaccine Tell the person who is giving you the vaccine:  If you have any severe, life-threatening allergies. If you ever had a life-threatening allergic reaction after a dose of flu vaccine, or have a severe allergy to any part of this vaccine, you may be advised not to get vaccinated. Most, but not all, types of flu vaccine contain a small amount of egg protein.  If you ever had Guillain-Barré Syndrome (also called GBS). Some people with a history of GBS should not get this vaccine. This should be discussed with your doctor.  If you are not feeling well. It is usually okay to get flu vaccine when you have a mild illness, but you might be asked to come back when you feel better. 4. Risks of a vaccine reaction With any medicine, including vaccines, there is a chance of reactions. These are usually mild and go away on their own, but serious reactions are also possible. Most people who get a flu shot do not have any problems with it. Minor problems following a flu shot include:  
 soreness, redness, or swelling where the shot was given  hoarseness  sore, red or itchy eyes  cough  fever  aches  headache  itching  fatigue If these problems occur, they usually begin soon after the shot and last 1 or 2 days. More serious problems following a flu shot can include the following:  There may be a small increased risk of Guillain-Barré Syndrome (GBS) after inactivated flu vaccine. This risk has been estimated at 1 or 2 additional cases per million people vaccinated. This is much lower than the risk of severe complications from flu, which can be prevented by flu vaccine.  Young children who get the flu shot along with pneumococcal vaccine (PCV13) and/or DTaP vaccine at the same time might be slightly more likely to have a seizure caused by fever. Ask your doctor for more information. Tell your doctor if a child who is getting flu vaccine has ever had a seizure. Problems that could happen after any injected vaccine:  People sometimes faint after a medical procedure, including vaccination. Sitting or lying down for about 15 minutes can help prevent fainting, and injuries caused by a fall. Tell your doctor if you feel dizzy, or have vision changes or ringing in the ears.  Some people get severe pain in the shoulder and have difficulty moving the arm where a shot was given. This happens very rarely.  Any medication can cause a severe allergic reaction. Such reactions from a vaccine are very rare, estimated at about 1 in a million doses, and would happen within a few minutes to a few hours after the vaccination. As with any medicine, there is a very remote chance of a vaccine causing a serious injury or death. The safety of vaccines is always being monitored. For more information, visit: www.cdc.gov/vaccinesafety/ 
 
5. What if there is a serious reaction? What should I look for?  Look for anything that concerns you, such as signs of a severe allergic reaction, very high fever, or unusual behavior.  
 
Signs of a severe allergic reaction can include hives, swelling of the face and throat, difficulty breathing, a fast heartbeat, dizziness, and weakness  usually within a few minutes to a few hours after the vaccination. What should I do?  If you think it is a severe allergic reaction or other emergency that cant wait, call 9-1-1 and get the person to the nearest hospital. Otherwise, call your doctor.  Reactions should be reported to the Vaccine Adverse Event Reporting System (VAERS). Your doctor should file this report, or you can do it yourself through  the VAERS web site at www.vaers. [facility].gov, or by calling 0-174.338.8236. VAERS does not give medical advice. 6. The National Vaccine Injury Compensation Program 
 
The [facility] Vaccine Injury Compensation Program (VICP) is a federal program that was created to compensate people who may have been injured by certain vaccines. Persons who believe they may have been injured by a vaccine can learn about the program and about filing a claim by calling 0-377.870.9521 or visiting the [address] [employer] website at www.[facility].gov/vaccinecompensation. There is a time limit to file a claim for compensation. 7. How can I learn more?  Ask your healthcare provider. He or she can give you the vaccine package insert or suggest other sources of information.  Call your local or state health department.  Contact the Centers for Disease Control and Prevention (CDC): 
- Call 6-879.619.2142 (1-800-CDC-INFO) or 
- Visit CDCs website at www.cdc.gov/flu Vaccine Information Statement Inactivated Influenza Vaccine 8/7/2015 
[address]omara Chappell [id number]-50 Department of Health and [employer] Centers for Disease Control and Prevention Office Use Only Neck Pain: Care Instructions Your Care Instructions You can have neck pain anywhere from the bottom of your head to the top of your shoulders. It can spread to the upper back or arms.  Injuries, painting a ceiling, sleeping with your neck twisted, staying in one position for too long, and many other activities can cause neck pain. Most neck pain gets better with home care. Your doctor may recommend medicine to relieve pain or relax your muscles. He or she may suggest exercise and physical therapy to increase flexibility and relieve stress. You may need to wear a special (cervical) collar to support your neck for a day or two. Follow-up care is a key part of your treatment and safety. Be sure to make and go to all appointments, and call your doctor if you are having problems. It's also a good idea to know your test results and keep a list of the medicines you take. How can you care for yourself at home? · Try using a heating pad on a low or medium setting for 15 to 20 minutes every 2 or 3 hours. Try a warm shower in place of one session with the heating pad. · You can also try an ice pack for 10 to 15 minutes every 2 to 3 hours. Put a thin cloth between the ice and your skin. · Take pain medicines exactly as directed. ¨ If the doctor gave you a prescription medicine for pain, take it as prescribed. ¨ If you are not taking a prescription pain medicine, ask your doctor if you can take an over-the-counter medicine. · If your doctor recommends a cervical collar, wear it exactly as directed. When should you call for help? Call your doctor now or seek immediate medical care if: 
  · You have new or worsening numbness in your arms, buttocks or legs.  
  · You have new or worsening weakness in your arms or legs. (This could make it hard to stand up.)  
  · You lose control of your bladder or bowels.  
 Watch closely for changes in your health, and be sure to contact your doctor if: 
  · Your neck pain is getting worse.  
  · You are not getting better after 1 week.  
  · You do not get better as expected. Where can you learn more? Go to http://manuela-avtar.info/. Enter [id number] in the search box to learn more about \"Neck Pain: Care Instructions. \" 
 Current as of: November 29, 2017 Content Version: 11.7 © 9262-1198 [employer]. Care instructions adapted under license by [employer] (which disclaims liability or warranty for this information). If you have questions about a medical condition or this instruction, always ask your healthcare professional. [address] any warranty or liability for your use of this information.

## 2018-09-18 NOTE — PROGRESS NOTES
CC:  
Chief Complaint Patient presents with  Hypertension  Immunization/Injection  Neck Pain  Wrist Pain  
  left HISTORY OF PRESENT ILLNESS Jr Hernandez III is a 46 y.o. male. Presents for 6 month follow up evaluation. He has HTN, hyperlipidemia, prediabetes, heart palpitations, allergic rhinitis, family history of prostate cancer, and family history of colon cancer. Today he complains of 1 week history of right-sided posterior neck pain that is worse when he sits in a hard chair and 1 week history of left wrist pain that can occur any time of the day. Is right-handed but uses left hand who hold guitar when he plays. Had left calf \"warm\" sensation a couple of weeks ago. Was seen at [facility]. Had D-dimer and other labs that were normal.   
  
Reports compliance with low-salt, low-fat diet and medications; tolerating medications with no side effects. Not exercising recently (used to play basketball for exercise). Denies heart palpitations, CP, SOB, or dizziness.  
   
Soc Hx 
. Has 2 children ages 11 and 6. He works as the [employer]. Job is stressful. Former smoker; quit in 1997 (smoked 1/2 ppd for 2 yrs). Drinks 1/2 to 1 glass of wine with dinner. Denies recreational drug use. Not exercising recently (used to play basketball for exercise).    
Health Maintenance Flu vaccine: 9/18/18                                                         
Tetanus vaccine: Tdap 10/4/12                                   
Colonoscopy: 6/12/15 (diverticulosis, grade 1 internal hemorrhoids); next due in 10 yrs (2020) Eye exam: Fall 2015 ([facility])             
   
ROS A complete review of systems was performed and is negative except for those mentioned in the HPI. Patient Active Problem List  
Diagnosis Code  Family history of colon cancer Z80.0  Family history of prostate cancer Z80.42  
 Palpitations R00.2  Low back pain M54.5  Essential hypertension I10  
 Hyperlipidemia E78.5  Allergic rhinitis J30.9  Overweight (BMI 25.0-29. 9) E66.3  Right inguinal hernia K40.90 Past Medical History:  
Diagnosis Date  Allergic rhinitis  Family history of colon cancer  Family history of prostate cancer  Heart palpitations 2011 Event monitor: occ PVC's. Echo nl. Eval by Dr. Rickey Vergara  Hypertension  Lumbar back pain No Known Allergies Current Outpatient Prescriptions Medication Sig Dispense Refill  metoprolol succinate (TOPROL-XL) 25 mg XL tablet Take 1 Tab by mouth nightly. 90 Tab 3  
 lisinopril (PRINIVIL, ZESTRIL) 10 mg tablet Take 1 Tab by mouth daily. Indications: hypertension 90 Tab 3  
 atorvastatin (LIPITOR) 10 mg tablet TAKE 1 TABLET BY MOUTH ONE TIME DAILY 90 Tab 3  
 sildenafil citrate (VIAGRA) 50 mg tablet Take 30 mins to 1 hr before planned intercourse. 4 Tab 6  
 hydroCHLOROthiazide (MICROZIDE) 12.5 mg capsule 1 daily 90 Cap 3  
 multivitamin (ONE A DAY) tablet Take 1 Tab by mouth daily.  ibuprofen (MOTRIN) 200 mg tablet Take 200 mg by mouth as needed.  aspirin delayed-release 81 mg tablet Take 81 mg by mouth daily. PHYSICAL EXAM 
Visit Vitals  /79 (BP 1 Location: Left arm, BP Patient Position: Sitting)  Pulse 75  Temp 98 °F (36.7 °C) (Oral)  Resp 18  Ht 5' 7\" (1.702 m)  Wt 179 lb (81.2 kg)  SpO2 98%  BMI 28.04 kg/m2 Weight up 1 lb since last clinic visit 6 months ago General: Well-developed and well-nourished, no distress. HEENT:  Head normocephalic/atraumatic, no scleral icterus Neck: Supple. No carotid bruits, JVD, lymphadenopathy, or thyromegaly. Mild tenderness along right base of neck. Lungs:  Clear to ausculation bilaterally. Good air movement. Heart:  Regular rate and rhythm, normal S1 and S2, no murmur, gallop, or rub Extremities: No clubbing, cyanosis, or edema.  Left wrist pain with hyperextension. No tenderness, normal ROM. No left calf tenderness or swelling. Neurological: Alert and oriented. Psychiatric: Normal mood and affect. Behavior is normal.  
 
 
 
ASSESSMENT AND PLAN 
  ICD-10-CM ICD-9-CM 1. Essential hypertension E96 907.9 METABOLIC PANEL, COMPREHENSIVE  
   CBC WITH AUTOMATED DIFF 2. Mixed hyperlipidemia E78.2 272.2 LIPID PANEL  
   TSH RFX ON ABNORMAL TO FREE T4  
3. Posterior neck pain M54.2 723.1 4. Left wrist pain M25.532 719.43   
5. Hypercalcemia T73.09 202.30 METABOLIC PANEL, BASIC  
   PTH INTACT 6. Overweight (BMI 25.0-29. 9) E66.3 278.02   
7. Encounter for immunization Z23 V03.89 INFLUENZA VIRUS VAC QUAD,SPLIT,PRESV FREE SYRINGE IM  
   [place] IMMUNIZ ADMIN,1 SINGLE/COMB VAC/TOXOID 8. Screening for diabetes mellitus Z13.1 V77.1 HEMOGLOBIN A1C WITH EAG  
9. Screening for prostate cancer Z12.5 V76.44 PSA SCREENING (SCREENING) Diagnoses and all orders for this visit: 1. Essential hypertension Controlled. Continue metoprolol, lisinopril, and HCTZ. 
-     METABOLIC PANEL, COMPREHENSIVE; Future -     CBC WITH AUTOMATED DIFF; Future 2. Mixed hyperlipidemia Continue statin. -     LIPID PANEL; Future 
-     TSH RFX ON ABNORMAL TO FREE T4; Future 3. Posterior neck pain Likely muscular. Recommended ibuprofen or Aleve as needed, heating pad, and analgesic pain cream. 
 
4. Left wrist pain Likely tendonitis from overuse. Recommended ibuprofen or Aleve as needed, heating pad, and analgesic pain cream. 
 
5. Hypercalcemia -     METABOLIC PANEL, BASIC 
-     PTH INTACT 6. Overweight (BMI 25.0-29. 9) Discussed the patient's BMI with him. The BMI follow up plan is as follows: dietary management education, guidance, and counseling; encourage exercise; monitor weight; prescribed dietary intake. Follow up BMI in 6 months. 7. Encounter for immunization -     Influenza virus vaccine (QUADRIVALENT PRES FREE SYRINGE) IM (24273) -     [place] IMMUNIZ ADMIN,1 SINGLE/COMB VAC/TOXOID 8. Screening for diabetes mellitus 
-     HEMOGLOBIN A1C WITH EAG; Future 9. Screening for prostate cancer -     PSA SCREENING (SCREENING); Future Follow-up Disposition: 
Return in about 6 months (around 3/18/2019), or if symptoms worsen or fail to improve, for HTN, hyperlipidemia; schedule for fasting labs 1 week before next clinic visit. I have discussed the diagnosis with the patient and the intended plan as seen in the above orders. Patient is in agreement. The patient has received an after-visit summary and questions were answered concerning future plans. I have discussed medication side effects and warnings with the patient as well.

## 2018-09-19 LAB
BUN SERPL-MCNC: 10 MG/DL (ref 6–24)
BUN/CREAT SERPL: 8 (ref 9–20)
CALCIUM SERPL-MCNC: 10.1 MG/DL (ref 8.7–10.2)
CHLORIDE SERPL-SCNC: 99 MMOL/L (ref 96–106)
CO2 SERPL-SCNC: 25 MMOL/L (ref 20–29)
CREAT SERPL-MCNC: 1.19 MG/DL (ref 0.76–1.27)
GLUCOSE SERPL-MCNC: 93 MG/DL (ref 65–99)
POTASSIUM SERPL-SCNC: 5.1 MMOL/L (ref 3.5–5.2)
PTH-INTACT SERPL-MCNC: 28 PG/ML (ref 15–65)
SODIUM SERPL-SCNC: 140 MMOL/L (ref 134–144)

## 2018-09-21 NOTE — PROGRESS NOTES
Your labs showed normal kidney tests, calcium, and parathyroid hormone, the hormone that controls calcium levels.

## 2019-01-07 ENCOUNTER — OFFICE VISIT (OUTPATIENT)
Dept: INTERNAL MEDICINE CLINIC | Facility: CLINIC | Age: 54
End: 2019-01-07

## 2019-01-07 VITALS
SYSTOLIC BLOOD PRESSURE: 118 MMHG | WEIGHT: 182 LBS | OXYGEN SATURATION: 99 % | HEIGHT: 67 IN | RESPIRATION RATE: 18 BRPM | HEART RATE: 76 BPM | BODY MASS INDEX: 28.56 KG/M2 | TEMPERATURE: 98 F | DIASTOLIC BLOOD PRESSURE: 76 MMHG

## 2019-01-07 DIAGNOSIS — M25.572 CHRONIC PAIN OF LEFT ANKLE: Primary | ICD-10-CM

## 2019-01-07 DIAGNOSIS — G89.29 CHRONIC PAIN OF LEFT ANKLE: Primary | ICD-10-CM

## 2019-01-07 NOTE — PATIENT INSTRUCTIONS
Foot Pain: Care Instructions Your Care Instructions Foot injuries that cause pain and swelling are fairly common. Almost all sports or home repair projects can cause a misstep that ends up as foot pain. Normal wear and tear, especially as you get older, also can cause foot pain. Most minor foot injuries will heal on their own, and home treatment is usually all you need to do. If you have a severe injury, you may need tests and treatment. Follow-up care is a key part of your treatment and safety. Be sure to make and go to all appointments, and call your doctor if you are having problems. It's also a good idea to know your test results and keep a list of the medicines you take. How can you care for yourself at home? · Take pain medicines exactly as directed. ? If the doctor gave you a prescription medicine for pain, take it as prescribed. ? If you are not taking a prescription pain medicine, ask your doctor if you can take an over-the-counter medicine. · Rest and protect your foot. Take a break from any activity that may cause pain. · Put ice or a cold pack on your foot for 10 to 20 minutes at a time. Put a thin cloth between the ice and your skin. · Prop up the sore foot on a pillow when you ice it or anytime you sit or lie down during the next 3 days. Try to keep it above the level of your heart. This will help reduce swelling. · Your doctor may recommend that you wrap your foot with an elastic bandage. Keep your foot wrapped for as long as your doctor advises. · If your doctor recommends crutches, use them as directed. · Wear roomy footwear. · As soon as pain and swelling end, begin gentle exercises of your foot. Your doctor can tell you which exercises will help. When should you call for help? Call 911 anytime you think you may need emergency care. For example, call if: 
  · Your foot turns pale, white, blue, or cold.  
 Call your doctor now or seek immediate medical care if:   · You cannot move or stand on your foot.  
  · Your foot looks twisted or out of its normal position.  
  · Your foot is not stable when you step down.  
  · You have signs of infection, such as: 
? Increased pain, swelling, warmth, or redness. ? Red streaks leading from the sore area. ? Pus draining from a place on your foot. ? A fever.  
  · Your foot is numb or tingly.  
 Watch closely for changes in your health, and be sure to contact your doctor if: 
  · You do not get better as expected.  
  · You have bruises from an injury that last longer than 2 weeks. Where can you learn more? Go to http://manuela-avtar.info/. Enter E501 in the search box to learn more about \"Foot Pain: Care Instructions. \" Current as of: November 29, 2017 Content Version: 11.8 © 3380-8066 [employer]. Care instructions adapted under license by [employer] (which disclaims liability or warranty for this information). If you have questions about a medical condition or this instruction, always ask your healthcare professional. [address] any warranty or liability for your use of this information.

## 2019-01-07 NOTE — PROGRESS NOTES
Margarette Duval III  Identified pt with two pt identifiers(name and ). Chief Complaint Patient presents with  Ankle Pain  Leg Swelling  
  left 1. Have you been to the ER, urgent care clinic since your last visit? Hospitalized since your last visit? NO 
 
2. Have you seen or consulted any other health care providers outside of the [address] [place] [address] since your last visit? Include any pap smears or colon screening. NO Today's provider has been notified of reason for visit, vitals and flowsheets obtained on patients. Patient received paperwork for advance directive during previous visit but has not completed at this time Reviewed record In preparation for visit, huddled with provider and have obtained necessary documentation Health Maintenance Due Topic  Shingrix Vaccine Age 50> (1 of 2) Wt Readings from Last 3 Encounters:  
19 182 lb (82.6 kg) 18 179 lb (81.2 kg) 18 178 lb (80.7 kg) Temp Readings from Last 3 Encounters:  
19 98 °F (36.7 °C) (Oral) 18 98 °F (36.7 °C) (Oral) 18 98.3 °F (36.8 °C) (Oral) BP Readings from Last 3 Encounters:  
19 118/76  
18 119/79  
18 117/72 Pulse Readings from Last 3 Encounters:  
19 76  
18 75  
18 64 Vitals:  
 19 1103 BP: 118/76 Pulse: 76 Resp: 18 Temp: 98 °F (36.7 °C) TempSrc: Oral  
SpO2: 99% Weight: 182 lb (82.6 kg) Height: 5' 7\" (1.702 m) PainSc:   3 PainLoc: Leg  
 
 
 
Learning Assessment: 
:  
 
Learning Assessment 2014 PRIMARY LEARNER Patient HIGHEST LEVEL OF EDUCATION - PRIMARY LEARNER  4 YEARS OF COLLEGE  
BARRIERS PRIMARY LEARNER NONE  
CO-LEARNER CAREGIVER No  
PRIMARY LANGUAGE ENGLISH  
LEARNER PREFERENCE PRIMARY DEMONSTRATION  
ANSWERED BY patient RELATIONSHIP SELF Depression Screening: 
:  
 
PHQ over the last two weeks 2019 Little interest or pleasure in doing things Not at all Feeling down, depressed, irritable, or hopeless Not at all Total Score PHQ 2 0 Fall Risk Assessment: 
:  
 
Fall Risk Assessment, last 12 mths 10/14/2016 Able to walk? Yes Fall in past 12 months? No  
 
 
Abuse Screening: 
:  
 
No flowsheet data found. ADL Screening: 
:  
 
ADL Assessment 3/20/2018 Feeding yourself No Help Needed Getting from bed to chair No Help Needed Getting dressed No Help Needed Bathing or showering No Help Needed Walk across the room (includes cane/walker) No Help Needed Using the telphone No Help Needed Taking your medications No Help Needed Preparing meals No Help Needed Managing money (expenses/bills) No Help Needed Moderately strenuous housework (laundry) No Help Needed Shopping for personal items (toiletries/medicines) No Help Needed Shopping for groceries No Help Needed Driving No Help Needed Climbing a flight of stairs No Help Needed Getting to places beyond walking distances No Help Needed Medication reconciliation up to date and corrected with patient at this time.

## 2019-01-07 NOTE — PROGRESS NOTES
CC:  
Chief Complaint Patient presents with  Ankle Pain  Leg Swelling  
  left HISTORY OF PRESENT ILLNESS Juani Bridges III is a 46 y.o. male. Patient complains of left ankle pain with swelling. Pain is 3/10 in severity, intermittent, located at medial malleolus, denies trauma or injury, non know exacerbating or alleviating factors. Patient Active Problem List  
Diagnosis Code  Family history of colon cancer Z80.0  Family history of prostate cancer Z80.42  
 Palpitations R00.2  Low back pain M54.5  Essential hypertension I10  
 Hyperlipidemia E78.5  Allergic rhinitis J30.9  Overweight (BMI 25.0-29. 9) E66.3  Right inguinal hernia K40.90 Past Medical History:  
Diagnosis Date  Allergic rhinitis  Family history of colon cancer  Family history of prostate cancer  Heart palpitations 2011 Event monitor: occ PVC's. Echo nl. Eval by Dr. Lisa Cao  Hypertension  Lumbar back pain No Known Allergies Current Outpatient Medications Medication Sig Dispense Refill  metoprolol succinate (TOPROL-XL) 25 mg XL tablet Take 1 Tab by mouth nightly. 90 Tab 3  
 lisinopril (PRINIVIL, ZESTRIL) 10 mg tablet Take 1 Tab by mouth daily. Indications: hypertension 90 Tab 3  
 atorvastatin (LIPITOR) 10 mg tablet TAKE 1 TABLET BY MOUTH ONE TIME DAILY 90 Tab 3  
 sildenafil citrate (VIAGRA) 50 mg tablet Take 30 mins to 1 hr before planned intercourse. 4 Tab 6  
 hydroCHLOROthiazide (MICROZIDE) 12.5 mg capsule 1 daily 90 Cap 3  
 multivitamin (ONE A DAY) tablet Take 1 Tab by mouth daily.  ibuprofen (MOTRIN) 200 mg tablet Take 200 mg by mouth as needed.  aspirin delayed-release 81 mg tablet Take 81 mg by mouth daily. PHYSICAL EXAM 
Visit Vitals /76 (BP 1 Location: Left arm, BP Patient Position: Sitting) Pulse 76 Temp 98 °F (36.7 °C) (Oral) Resp 18 Ht 5' 7\" (1.702 m) Wt 182 lb (82.6 kg) SpO2 99% BMI 28.51 kg/m² General: Well-developed and well-nourished, no distress. HEENT:  Head normocephalic/atraumatic, no scleral icterus Extremities: No clubbing, cyanosis. Trace LLE edema. Left ankle with mild swelling at lateral and medial malleoli. No tenderness. Normal ROM. Psychiatric: Normal mood and affect. Behavior is normal.  
 
 
 
ASSESSMENT AND PLAN 
  ICD-10-CM ICD-9-CM 1. Chronic pain of left ankle M25.572 719.47 XR ANKLE LT MIN 3 V  
 G89.29 338.29 Diagnoses and all orders for this visit: 
 
1. Chronic pain of left ankle Recommended leg elevation, ice and ibuprofen as needed. -     XR ANKLE LT MIN 3 V; Future Follow-up Disposition: 
Return if symptoms worsen or fail to improve, for Scheduled appointment on 3/21/19. I have discussed the diagnosis with the patient and the intended plan as seen in the above orders. Patient is in agreement. The patient has received an after-visit summary and questions were answered concerning future plans. I have discussed medication side effects and warnings with the patient as well.

## 2019-02-11 ENCOUNTER — OFFICE VISIT (OUTPATIENT)
Dept: CARDIOLOGY CLINIC | Age: 54
End: 2019-02-11

## 2019-02-11 VITALS
SYSTOLIC BLOOD PRESSURE: 130 MMHG | OXYGEN SATURATION: 93 % | RESPIRATION RATE: 16 BRPM | HEIGHT: 67 IN | HEART RATE: 76 BPM | WEIGHT: 182.2 LBS | DIASTOLIC BLOOD PRESSURE: 80 MMHG | BODY MASS INDEX: 28.6 KG/M2

## 2019-02-11 DIAGNOSIS — E78.5 HYPERLIPIDEMIA, UNSPECIFIED HYPERLIPIDEMIA TYPE: ICD-10-CM

## 2019-02-11 DIAGNOSIS — R00.2 PALPITATIONS: Primary | ICD-10-CM

## 2019-02-11 DIAGNOSIS — I10 ESSENTIAL HYPERTENSION: ICD-10-CM

## 2019-02-11 DIAGNOSIS — M79.89 LEG SWELLING: ICD-10-CM

## 2019-02-11 NOTE — PROGRESS NOTES
1. Have you been to the ER, urgent care clinic since your last visit? Hospitalized since your last visit? NO 
 
2. Have you seen or consulted any other health care providers outside of the [address] [place] [address] since your last visit? Include any pap smears or colon screening. NO 
 
NO CARDIAC C/O.

## 2019-02-11 NOTE — PROGRESS NOTES
[address] [facility], [address]  [phone]     Subjective:      Derrek Cruz III is a 46 y.o. male is here for routine f/u. Reports leg swelling L>R with tortuous varicose veins. The patient denies chest pain/ shortness of breath, orthopnea, PND, palpitations, syncope, or presyncope. Patient Active Problem List    Diagnosis Date Noted    Right inguinal hernia 01/14/2018    Overweight (BMI 25.0-29.9) 09/19/2017    Hyperlipidemia 06/12/2014    Allergic rhinitis 06/12/2014    Essential hypertension     Low back pain 04/03/2012    Palpitations 06/20/2011    Family history of colon cancer 06/14/2010    Family history of prostate cancer 06/14/2010      Blaise Dumont MD  Past Medical History:   Diagnosis Date    Allergic rhinitis     Family history of colon cancer     Family history of prostate cancer     Heart palpitations 2011    Event monitor: occ PVC's. Echo nl.  Eval by Dr. Ayden Echavarria Hypertension     Lumbar back pain       Past Surgical History:   Procedure Laterality Date    HX COLONOSCOPY  2008    Dr Brie Wong HX COLONOSCOPY  6/12/15    Dr. Luis E Patel; diverticulosis, int. hemorrhoids     No Known Allergies   Family History   Problem Relation Age of Onset   Nito.Katie Cancer Mother         rectal ca    Diabetes Mother     Other Mother         thyroid issues/unknown type    Cancer Sister       Social History     Socioeconomic History    Marital status:      Spouse name: Not on file    Number of children: Not on file    Years of education: Not on file    Highest education level: Not on file   Social Needs    Financial resource strain: Not on file    Food insecurity - worry: Not on file    Food insecurity - inability: Not on file   [facility] needs - medical: Not on file   [employer] needs - non-medical: Not on file   Occupational History    Not on file   Tobacco Use    Smoking status: Former Smoker     Packs/day: 0.50     Years: 2.00 Pack years: 1.00     Types: Cigarettes     Last attempt to quit: 1/3/1997     Years since quittin.1    Smokeless tobacco: Former User     Types: Chew     Quit date: 1/3/1996   Substance and Sexual Activity    Alcohol use: Yes     Alcohol/week: 3.5 oz     Types: 7 Standard drinks or equivalent per week    Drug use: No    Sexual activity: Not on file   Other Topics Concern    Not on file   Social History Narrative    Not on file      Current Outpatient Medications   Medication Sig    metoprolol succinate (TOPROL-XL) 25 mg XL tablet Take 1 Tab by mouth nightly.  lisinopril (PRINIVIL, ZESTRIL) 10 mg tablet Take 1 Tab by mouth daily. Indications: hypertension    atorvastatin (LIPITOR) 10 mg tablet TAKE 1 TABLET BY MOUTH ONE TIME DAILY    hydroCHLOROthiazide (MICROZIDE) 12.5 mg capsule 1 daily    multivitamin (ONE A DAY) tablet Take 1 Tab by mouth daily.  ibuprofen (MOTRIN) 200 mg tablet Take 200 mg by mouth as needed. No current facility-administered medications for this visit. Review of Symptoms:  11 systems reviewed, negative other than as stated in the HPI    Physical ExamPhysical Exam:    Vitals:    19 1524 19 1530   BP: 130/82 130/80   Pulse: 76    Resp: 16    SpO2: 93%    Weight: 182 lb 3.2 oz (82.6 kg)    Height: 5' 7\" (1.702 m)      Body mass index is 28.54 kg/m². General PE   Gen:  NAD  Mental Status - Alert. General Appearance - Not in acute distress. Chest and Lung Exam   Inspection: Accessory muscles - No use of accessory muscles in breathing. Auscultation:   Breath sounds: - Normal.   Cardiovascular   Inspection: Jugular vein - Bilateral - Inspection Normal.   Palpation/Percussion:   Apical Impulse: - Normal.   Auscultation: Rhythm - Regular. Heart Sounds - S1 WNL and S2 WNL. No S3 or S4. Murmurs & Other Heart Sounds: Auscultation of the heart reveals - No Murmurs.    Peripheral Vascular   Upper Extremity: Inspection - Bilateral - No Cyanotic nailbeds or Digital clubbing. Lower Extremity:   Palpation: Edema - Bilateral - No edema. Abdomen:   Soft, non-tender, bowel sounds are active. Neuro: A&O times 3, CN and motor grossly WNL    Labs:   Lab Results   Component Value Date/Time    Cholesterol, total 216 (H) 03/20/2018 09:00 AM    Cholesterol, total 205 (H) 09/19/2017 09:22 AM    Cholesterol, total 196 05/16/2017 10:22 AM    Cholesterol, total 235 (H) 01/18/2017 09:54 AM    Cholesterol, total 217 (H) 02/25/2016 12:12 PM    HDL Cholesterol 123 03/20/2018 09:00 AM    HDL Cholesterol 133 09/19/2017 09:22 AM    HDL Cholesterol 124 05/16/2017 10:22 AM    HDL Cholesterol 116 01/18/2017 09:54 AM    HDL Cholesterol 114 02/25/2016 12:12 PM    LDL, calculated 84 03/20/2018 09:00 AM    LDL, calculated 64 09/19/2017 09:22 AM    LDL, calculated 64 05/16/2017 10:22 AM    LDL, calculated 110 (H) 01/18/2017 09:54 AM    LDL, calculated 94 02/25/2016 12:12 PM    Triglyceride 47 03/20/2018 09:00 AM    Triglyceride 42 09/19/2017 09:22 AM    Triglyceride 42 05/16/2017 10:22 AM    Triglyceride 44 01/18/2017 09:54 AM    Triglyceride 47 02/25/2016 12:12 PM     No results found for: CPK, CPKX, CPX  Lab Results   Component Value Date/Time    Sodium 140 09/18/2018 01:42 PM    Potassium 5.1 09/18/2018 01:42 PM    Chloride 99 09/18/2018 01:42 PM    CO2 25 09/18/2018 01:42 PM    Anion gap 10 09/07/2016 03:45 PM    Glucose 93 09/18/2018 01:42 PM    BUN 10 09/18/2018 01:42 PM    Creatinine 1.19 09/18/2018 01:42 PM    BUN/Creatinine ratio 8 (L) 09/18/2018 01:42 PM    GFR est AA 80 09/18/2018 01:42 PM    GFR est non-AA 69 09/18/2018 01:42 PM    Calcium 10.1 09/18/2018 01:42 PM    Bilirubin, total 1.0 03/20/2018 09:00 AM    AST (SGOT) 31 03/20/2018 09:00 AM    Alk.  phosphatase 53 03/20/2018 09:00 AM    Protein, total 7.5 03/20/2018 09:00 AM    Albumin 4.8 03/20/2018 09:00 AM    Globulin 3.4 09/07/2016 03:45 PM    A-G Ratio 1.8 03/20/2018 09:00 AM    ALT (SGPT) 28 03/20/2018 09:00 AM EKG:  NSR      Assessment:     Assessment:      1. Palpitations    2. Essential hypertension    3. Hyperlipidemia, unspecified hyperlipidemia type        Orders Placed This Encounter    AMB POC EKG ROUTINE W/ 12 LEADS, INTER & REP     Order Specific Question:   Reason for Exam:     Answer:   ROUTINE        Plan:     Patient presents for f/u, doing well and stable from cardiac standpoint. Hx Palpitations  Normal EF with no significant valvular pathology per echo in 2016  Holter 2016 showed ST  Normal stress ekg in 10/16  Had stopped taking BB 2 months ago as palpitations have resolved. HTN  well controlled     Hyperlipidemia  3/18 LDL at 84. On statin. Labs and lipids are followed by PCP     Leg swelling L>R with tortuous varicose veins, on his feet a lot  Good pedal pulses  Will check LE dopplers to r/o venous reflux disease and if positive, will refer to Dr Sylvia Garcia current care and f/u as needed with Dr Jamar Rubio, PAU       [facility]    2/11/2019         Patient seen, examined by me personally. Plan discussed as detailed. Agree with note as outlined by  NP. I confirm findings in history and physical exam. No additional findings noted. Agree with plan as outlined above.      Karyn Wilhelm MD

## 2019-03-04 RX ORDER — HYDROCHLOROTHIAZIDE 12.5 MG/1
CAPSULE ORAL
Qty: 90 CAP | Refills: 2 | Status: SHIPPED | OUTPATIENT
Start: 2019-03-04 | End: 2019-04-01 | Stop reason: SDUPTHER

## 2019-03-12 DIAGNOSIS — E78.2 MIXED HYPERLIPIDEMIA: ICD-10-CM

## 2019-03-12 DIAGNOSIS — I10 ESSENTIAL HYPERTENSION: ICD-10-CM

## 2019-03-12 DIAGNOSIS — Z12.5 SCREENING FOR PROSTATE CANCER: ICD-10-CM

## 2019-03-12 DIAGNOSIS — Z13.1 SCREENING FOR DIABETES MELLITUS: ICD-10-CM

## 2019-03-13 LAB
ALBUMIN SERPL-MCNC: 4.8 G/DL (ref 3.5–5.5)
ALBUMIN/GLOB SERPL: 2.2 {RATIO} (ref 1.2–2.2)
ALP SERPL-CCNC: 49 IU/L (ref 39–117)
ALT SERPL-CCNC: 19 IU/L (ref 0–44)
AST SERPL-CCNC: 22 IU/L (ref 0–40)
BASOPHILS # BLD AUTO: 0 X10E3/UL (ref 0–0.2)
BASOPHILS NFR BLD AUTO: 0 %
BILIRUB SERPL-MCNC: 0.8 MG/DL (ref 0–1.2)
BUN SERPL-MCNC: 16 MG/DL (ref 6–24)
BUN/CREAT SERPL: 14 (ref 9–20)
CALCIUM SERPL-MCNC: 10.4 MG/DL (ref 8.7–10.2)
CHLORIDE SERPL-SCNC: 101 MMOL/L (ref 96–106)
CHOLEST SERPL-MCNC: 195 MG/DL (ref 100–199)
CO2 SERPL-SCNC: 26 MMOL/L (ref 20–29)
CREAT SERPL-MCNC: 1.17 MG/DL (ref 0.76–1.27)
EOSINOPHIL # BLD AUTO: 0.1 X10E3/UL (ref 0–0.4)
EOSINOPHIL NFR BLD AUTO: 2 %
ERYTHROCYTE [DISTWIDTH] IN BLOOD BY AUTOMATED COUNT: 14.7 % (ref 12.3–15.4)
EST. AVERAGE GLUCOSE BLD GHB EST-MCNC: 117 MG/DL
GLOBULIN SER CALC-MCNC: 2.2 G/DL (ref 1.5–4.5)
GLUCOSE SERPL-MCNC: 97 MG/DL (ref 65–99)
HBA1C MFR BLD: 5.7 % (ref 4.8–5.6)
HCT VFR BLD AUTO: 41.1 % (ref 37.5–51)
HDLC SERPL-MCNC: 103 MG/DL
HGB BLD-MCNC: 14 G/DL (ref 13–17.7)
IMM GRANULOCYTES # BLD AUTO: 0 X10E3/UL (ref 0–0.1)
IMM GRANULOCYTES NFR BLD AUTO: 0 %
LDLC SERPL CALC-MCNC: 84 MG/DL (ref 0–99)
LYMPHOCYTES # BLD AUTO: 1.6 X10E3/UL (ref 0.7–3.1)
LYMPHOCYTES NFR BLD AUTO: 32 %
MCH RBC QN AUTO: 26.3 PG (ref 26.6–33)
MCHC RBC AUTO-ENTMCNC: 34.1 G/DL (ref 31.5–35.7)
MCV RBC AUTO: 77 FL (ref 79–97)
MONOCYTES # BLD AUTO: 0.5 X10E3/UL (ref 0.1–0.9)
MONOCYTES NFR BLD AUTO: 10 %
NEUTROPHILS # BLD AUTO: 2.7 X10E3/UL (ref 1.4–7)
NEUTROPHILS NFR BLD AUTO: 56 %
PLATELET # BLD AUTO: 325 X10E3/UL (ref 150–379)
POTASSIUM SERPL-SCNC: 5.3 MMOL/L (ref 3.5–5.2)
PROT SERPL-MCNC: 7 G/DL (ref 6–8.5)
PSA SERPL-MCNC: 1.3 NG/ML (ref 0–4)
RBC # BLD AUTO: 5.32 X10E6/UL (ref 4.14–5.8)
SODIUM SERPL-SCNC: 141 MMOL/L (ref 134–144)
TRIGL SERPL-MCNC: 40 MG/DL (ref 0–149)
TSH SERPL DL<=0.005 MIU/L-ACNC: 0.91 UIU/ML (ref 0.45–4.5)
VLDLC SERPL CALC-MCNC: 8 MG/DL (ref 5–40)
WBC # BLD AUTO: 4.8 X10E3/UL (ref 3.4–10.8)

## 2019-03-15 NOTE — PROGRESS NOTES
Will discuss results at 3/20/19 clinic visit. Normal kidney and liver tests, blood counts, thyroid, cholesterol (tot chol 195, LDL 84; was 216 and 84 respectively a yr ago), and PSA. A1c 5.7% (mild prediabetes, was nl at 5.5% a yr ago). Mildly elevated potassium at 5.3 (nl range 3.5-5.2) and mildly elevated Ca at 10.4 (nl range 8.7-10.2, was normal 5 months ago but the same at 10.4 a yr ago).

## 2019-03-20 ENCOUNTER — OFFICE VISIT (OUTPATIENT)
Dept: INTERNAL MEDICINE CLINIC | Facility: CLINIC | Age: 54
End: 2019-03-20

## 2019-03-20 VITALS
DIASTOLIC BLOOD PRESSURE: 74 MMHG | TEMPERATURE: 98 F | WEIGHT: 182 LBS | BODY MASS INDEX: 28.56 KG/M2 | SYSTOLIC BLOOD PRESSURE: 127 MMHG | RESPIRATION RATE: 16 BRPM | HEIGHT: 67 IN | OXYGEN SATURATION: 98 % | HEART RATE: 58 BPM

## 2019-03-20 DIAGNOSIS — R73.03 PREDIABETES: ICD-10-CM

## 2019-03-20 DIAGNOSIS — I10 ESSENTIAL HYPERTENSION: Primary | ICD-10-CM

## 2019-03-20 DIAGNOSIS — E66.3 OVERWEIGHT (BMI 25.0-29.9): ICD-10-CM

## 2019-03-20 DIAGNOSIS — J30.1 SEASONAL ALLERGIC RHINITIS DUE TO POLLEN: ICD-10-CM

## 2019-03-20 DIAGNOSIS — E78.2 MIXED HYPERLIPIDEMIA: ICD-10-CM

## 2019-03-20 NOTE — PATIENT INSTRUCTIONS

## 2019-03-20 NOTE — PROGRESS NOTES
CC:  
Chief Complaint Patient presents with  Results  Cholesterol Problem HISTORY OF PRESENT ILLNESS Eduar Miranda III is a 46 y.o. male. Presents for 6 month follow up evaluation. He has HTN, hyperlipidemia, prediabetes, heart palpitations, allergic rhinitis, family history of prostate cancer, and family history of colon cancer. He has no complaints today. Left ankle pain for which he was seen in 1/19 has resolved. Reports compliance with low-salt, low-fat diet and medications; tolerating medications with no side effects. Infrequent heart palpitations. Denies CP, SOB, or dizziness.  
   
Soc Hx 
. Has 2 children ages 10 and 5. He works as the [employer]. Job is stressful. Former smoker; quit in 1997 (smoked 1/2 ppd for 2 yrs). Drinks 1/2 to 1 glass of wine with dinner. Denies recreational drug use. Exercises regularly. 
[employer] [facility]. Maintenance Flu vaccine: 9/18/18                                                         
Tetanus vaccine: Tdap 10/4/12                                   
Colonoscopy: 6/12/15 (diverticulosis, grade 1 internal hemorrhoids); next due in 10 yrs (2020) Eye exam: Fall 2015 ([facility])             
   
ROS A complete review of systems was performed and is negative except for those mentioned in the HPI. Patient Active Problem List  
Diagnosis Code  Family history of colon cancer Z80.0  Family history of prostate cancer Z80.42  
 Palpitations R00.2  Low back pain M54.5  Essential hypertension I10  
 Hyperlipidemia E78.5  Allergic rhinitis J30.9  Overweight (BMI 25.0-29. 9) E66.3  Right inguinal hernia K40.90 Past Medical History:  
Diagnosis Date  Allergic rhinitis  Family history of colon cancer  Family history of prostate cancer  Heart palpitations 2011 Event monitor: occ PVC's. Echo nl. Eval by Dr. Lacy Clarke  Hypertension  Lumbar back pain No Known Allergies Current Outpatient Medications Medication Sig Dispense Refill  hydroCHLOROthiazide (MICROZIDE) 12.5 mg capsule TAKE ONE CAPSULE BY MOUTH ONE TIME DAILY 90 Cap 2  
 atorvastatin (LIPITOR) 10 mg tablet TAKE 1 TABLET BY MOUTH ONE TIME DAILY FOR CHOLESTEROL 90 Tab 3  
 lisinopril (PRINIVIL, ZESTRIL) 10 mg tablet Take 1 Tab by mouth daily. For blood pressure. 90 Tab 3  
 multivitamin (ONE A DAY) tablet Take 1 Tab by mouth daily.  ibuprofen (MOTRIN) 200 mg tablet Take 200 mg by mouth as needed. PHYSICAL EXAM 
Visit Vitals /74 (BP 1 Location: Left arm, BP Patient Position: Sitting) Pulse (!) 58 Temp 98 °F (36.7 °C) (Oral) Resp 16 Ht 5' 7\" (1.702 m) Wt 182 lb (82.6 kg) SpO2 98% BMI 28.51 kg/m² Weight unchanged since last clinic visit General: Well-developed and well-nourished, no distress. HEENT:  Head normocephalic/atraumatic, no scleral icterus Neck: Supple. No carotid bruits, JVD, lymphadenopathy, or thyromegaly. Lungs:  Clear to ausculation bilaterally. Good air movement. Heart:  Bradycardic, regular rhythm, normal S1 and S2, no murmur, gallop, or rub Extremities: No clubbing, cyanosis, or edema. 2+ pedal pulses bilaterally. Neurological: Alert and oriented. Non-focal exam. 
Psychiatric: Normal mood and affect. Behavior is normal.  
 
Results for orders placed or performed in visit on 03/12/19 TSH RFX ON ABNORMAL TO FREE T4 Result Value Ref Range TSH 0.908 0.450 - 4.500 uIU/mL PSA SCREENING (SCREENING) Result Value Ref Range Prostate Specific Ag 1.3 0.0 - 4.0 ng/mL LIPID PANEL Result Value Ref Range Cholesterol, total 195 100 - 199 mg/dL Triglyceride 40 0 - 149 mg/dL HDL Cholesterol 103 >39 mg/dL VLDL, calculated 8 5 - 40 mg/dL LDL, calculated 84 0 - 99 mg/dL HEMOGLOBIN A1C WITH EAG Result Value Ref Range Hemoglobin A1c 5.7 (H) 4.8 - 5.6 % Estimated average glucose 117 mg/dL CBC WITH AUTOMATED DIFF  
 Result Value Ref Range WBC 4.8 3.4 - 10.8 x10E3/uL  
 RBC 5.32 4.14 - 5.80 x10E6/uL HGB 14.0 13.0 - 17.7 g/dL HCT 41.1 37.5 - 51.0 % MCV 77 (L) 79 - 97 fL  
 MCH 26.3 (L) 26.6 - 33.0 pg  
 MCHC 34.1 31.5 - 35.7 g/dL  
 RDW 14.7 12.3 - 15.4 % PLATELET 425 562 - 516 x10E3/uL NEUTROPHILS 56 Not Estab. % Lymphocytes 32 Not Estab. % MONOCYTES 10 Not Estab. % EOSINOPHILS 2 Not Estab. % BASOPHILS 0 Not Estab. %  
 ABS. NEUTROPHILS 2.7 1.4 - 7.0 x10E3/uL Abs Lymphocytes 1.6 0.7 - 3.1 x10E3/uL  
 ABS. MONOCYTES 0.5 0.1 - 0.9 x10E3/uL  
 ABS. EOSINOPHILS 0.1 0.0 - 0.4 x10E3/uL  
 ABS. BASOPHILS 0.0 0.0 - 0.2 x10E3/uL IMMATURE GRANULOCYTES 0 Not Estab. %  
 ABS. IMM. GRANS. 0.0 0.0 - 0.1 x10E3/uL METABOLIC PANEL, COMPREHENSIVE Result Value Ref Range Glucose 97 65 - 99 mg/dL BUN 16 6 - 24 mg/dL Creatinine 1.17 0.76 - 1.27 mg/dL GFR est non-AA 70 >59 mL/min/1.73 GFR est AA 81 >59 mL/min/1.73  
 BUN/Creatinine ratio 14 9 - 20 Sodium 141 134 - 144 mmol/L Potassium 5.3 (H) 3.5 - 5.2 mmol/L Chloride 101 96 - 106 mmol/L  
 CO2 26 20 - 29 mmol/L Calcium 10.4 (H) 8.7 - 10.2 mg/dL Protein, total 7.0 6.0 - 8.5 g/dL Albumin 4.8 3.5 - 5.5 g/dL GLOBULIN, TOTAL 2.2 1.5 - 4.5 g/dL A-G Ratio 2.2 1.2 - 2.2 Bilirubin, total 0.8 0.0 - 1.2 mg/dL Alk. phosphatase 49 39 - 117 IU/L  
 AST (SGOT) 22 0 - 40 IU/L  
 ALT (SGPT) 19 0 - 44 IU/L  
 
 
 
ASSESSMENT AND PLAN 
  ICD-10-CM ICD-9-CM 1. Essential hypertension I10 401.9 2. Prediabetes R73.03 790.29   
3. Mixed hyperlipidemia E78.2 272.2 4. Overweight (BMI 25.0-29. 9) E66.3 278.02   
5. Seasonal allergic rhinitis due to pollen J30.1 477.0 Discussed lab results from 3/12/19 with patient. Normal kidney and liver tests, blood counts, thyroid, cholesterol (tot chol 195, LDL 84; was 216 and 84 respectively a yr ago), and PSA.  A1c 5.7% (mild prediabetes, was nl at 5.5% a yr ago).  Mildly elevated potassium at 5.3 (nl range 3.5-5.2) and mildly elevated Ca at 10.4 (nl range 8.7-10.2, was normal 5 months ago but the same at 10.4 a yr ago). Diagnoses and all orders for this visit: 1. Essential hypertension Well-controlled on lisinopril and HCTZ. 2. Prediabetes Counseled on diet, exercise, and weight loss. 3. Mixed hyperlipidemia Controlled on atorvastatin 10 mg daily. 4. Overweight (BMI 25.0-29. 9) Discussed the patient's BMI with him. The BMI follow up plan is as follows: dietary management education, guidance, and counseling; encourage exercise; monitor weight; prescribed dietary intake. Follow up BMI in 6 months. 5. Seasonal allergic rhinitis due to pollen Follow up in 6 months for HTN, prediabetes. I have discussed the diagnosis with the patient and the intended plan as seen in the above orders. Patient is in agreement. The patient has received an after-visit summary and questions were answered concerning future plans. I have discussed medication side effects and warnings with the patient as well.

## 2019-03-20 NOTE — PROGRESS NOTES
[facility] III  Identified pt with two pt identifiers(name and ). Chief Complaint Patient presents with  Results  Cholesterol Problem 1. Have you been to the ER, urgent care clinic since your last visit? Hospitalized since your last visit? NO 
 
2. Have you seen or consulted any other health care providers outside of the [address] [place] [address] since your last visit? Include any pap smears or colon screening. Dr Susana Miller Today's provider has been notified of reason for visit, vitals and flowsheets obtained on patients. Patient received paperwork for advance directive during previous visit but has not completed at this time Reviewed record In preparation for visit, huddled with provider and have obtained necessary documentation There are no preventive care reminders to display for this patient. Wt Readings from Last 3 Encounters:  
19 182 lb (82.6 kg) 19 182 lb 3.2 oz (82.6 kg) 19 182 lb (82.6 kg) Temp Readings from Last 3 Encounters:  
19 98 °F (36.7 °C) (Oral) 19 98 °F (36.7 °C) (Oral) 18 98 °F (36.7 °C) (Oral) BP Readings from Last 3 Encounters:  
19 127/74  
19 130/80  
19 118/76 Pulse Readings from Last 3 Encounters:  
19 (!) 58  
19 76  
19 76 Vitals:  
 19 2713 BP: 127/74 Pulse: (!) 58 Resp: 16 Temp: 98 °F (36.7 °C) TempSrc: Oral  
SpO2: 98% Weight: 182 lb (82.6 kg) Height: 5' 7\" (1.702 m) PainSc:   2 PainLoc: Ankle Learning Assessment: 
:  
 
Learning Assessment 2014 PRIMARY LEARNER Patient HIGHEST LEVEL OF EDUCATION - PRIMARY LEARNER  4 YEARS OF COLLEGE  
BARRIERS PRIMARY LEARNER NONE  
CO-LEARNER CAREGIVER No  
PRIMARY LANGUAGE ENGLISH  
LEARNER PREFERENCE PRIMARY DEMONSTRATION  
ANSWERED BY patient RELATIONSHIP SELF Depression Screening: 
:  
 
3 most recent PHQ Screens 3/20/2019 Little interest or pleasure in doing things Not at all Feeling down, depressed, irritable, or hopeless Not at all Total Score PHQ 2 0 Fall Risk Assessment: 
:  
 
Fall Risk Assessment, last 12 mths 10/14/2016 Able to walk? Yes Fall in past 12 months? No  
 
 
Abuse Screening: 
:  
 
No flowsheet data found. ADL Screening: 
:  
 
ADL Assessment 3/20/2018 Feeding yourself No Help Needed Getting from bed to chair No Help Needed Getting dressed No Help Needed Bathing or showering No Help Needed Walk across the room (includes cane/walker) No Help Needed Using the telphone No Help Needed Taking your medications No Help Needed Preparing meals No Help Needed Managing money (expenses/bills) No Help Needed Moderately strenuous housework (laundry) No Help Needed Shopping for personal items (toiletries/medicines) No Help Needed Shopping for groceries No Help Needed Driving No Help Needed Climbing a flight of stairs No Help Needed Getting to places beyond walking distances No Help Needed Medication reconciliation up to date and corrected with patient at this time.

## 2019-08-28 ENCOUNTER — TELEPHONE (OUTPATIENT)
Dept: CARDIOLOGY CLINIC | Age: 54
End: 2019-08-28

## 2019-08-28 NOTE — TELEPHONE ENCOUNTER
----- Message from Amy Mendez NP sent at 8/28/2019  8:35 AM EDT -----  Pls call patient. His test showed that he has leaky veins on his legs. Pls have him see Dr Jenny Bailey. Thanks. Called pt,verified pt with two pt identifiers, advised that his doppler showed leaky veins in his legs and they would like for him to make an appt with our vascular specialist  to discuss further. Advised our  will call to schedule the appt with him. Pt verbalized understanding. Message   Received: [facility] Contents   Griselda Schmitt LPN   Caller: Unspecified Roxana Bowen,  8:40 AM)             Pt is scheduled for 9/3      Noted, thanks.

## 2019-09-03 ENCOUNTER — OFFICE VISIT (OUTPATIENT)
Dept: CARDIOLOGY CLINIC | Age: 54
End: 2019-09-03

## 2019-09-03 VITALS
HEART RATE: 72 BPM | BODY MASS INDEX: 28.36 KG/M2 | HEIGHT: 67 IN | WEIGHT: 180.7 LBS | DIASTOLIC BLOOD PRESSURE: 88 MMHG | RESPIRATION RATE: 18 BRPM | SYSTOLIC BLOOD PRESSURE: 130 MMHG | OXYGEN SATURATION: 99 %

## 2019-09-03 DIAGNOSIS — E78.5 HYPERLIPIDEMIA, UNSPECIFIED HYPERLIPIDEMIA TYPE: ICD-10-CM

## 2019-09-03 DIAGNOSIS — I10 ESSENTIAL HYPERTENSION: ICD-10-CM

## 2019-09-03 DIAGNOSIS — I87.2 VENOUS INSUFFICIENCY: Primary | ICD-10-CM

## 2019-09-03 NOTE — PROGRESS NOTES
1. Have you been to the ER, urgent care clinic since your last visit? Hospitalized since your last visit? No    2. Have you seen or consulted any other health care providers outside of the [address] [place] [address] since your last visit? Include any pap smears or colon screening. No    Chief Complaint   Patient presents with    Leg Swelling     NP, ref by Dr. Hall. Discuss venous doppler results. C/O leg swelling/pain, L>R.

## 2019-09-03 NOTE — PROGRESS NOTES
9/3/2019 3:00 PM      Subjective:     Amalia Fitzpatrick III is a 54 YO M with pmhx HTN HLD previous smoker (quit 25 yrs ago) is here for test follow up. He reports bilateral leg swelling L>R with tortuous varicose veins. Has been going on for several years but the leg swelling and veins has gotten worse. Also has rare episodes of left leg pain when sitting down. He works as an , stands about 3-4 hrs a day. He uses compression stockings which helps with his symptoms. He works out at a [employer] at least 3 times a week. He  denies chest pain, chest pressure/discomfort, dyspnea, palpitations, irregular heart beats, near-syncope, syncope, fatigue, orthopnea, paroxysmal nocturnal dyspnea, claudication, tachypnea, dyspnea on exertion, dizziness. 1.  Have you ever had vein stripping surgery NO       2. Have you ever had vein injections? NO        3. Have you ever had a blood clot? NO       4. Have you ever had phlebitis? NO                                                                        Does anyone in your family have (or used to have) varicose veins, spider veins, leg ulcers or swollen legs? Father  YES  Mother NO  Brother(s) NO  Sister(s) NO  Other NO           1. Do you experience any of the following in your legs? Aching/pain? [] One leg [x] Both legs  Heaviness? [] One leg [] Both legs  Tiredness/fatigue? [] One leg [] Both legs  Itching/burning? [] One leg [] Both legs  Swollen ankles? [] One leg [x] Both legs  Leg cramps? [] One leg [] Both legs  Restless legs? [] One leg [] Both legs  Throbbing? [] One leg [] Both legs  Other? 2.  Have your veins gotten worse in recent months? YES        3. Do you take any medication for pain (i.e., Advil, Motrin)  NO           4. Do you elevate your legs to relieve discomfort? YES        5. Do you exercise? YES        6.    Do you wear prescription compression stockings? NO       7. Do you wear light support hose (i.e., [employer])? NO                    8.    Do you have any problem walking? NO                                 9.   What type of work do you do?       10. Have you ever had any test(s) done on your veins? YES          11. Were you diagnosed with saphenous vein reflux? YES         Visit Vitals  /88 (BP 1 Location: Right arm) Comment: lg cuff   Pulse 72   Resp 18   Ht 5' 7\" (1.702 m)   Wt 180 lb 11.2 oz (82 kg)   SpO2 99%   BMI 28.30 kg/m²       Current Outpatient Medications   Medication Sig    hydroCHLOROthiazide (MICROZIDE) 12.5 mg capsule TAKE ONE CAPSULE BY MOUTH ONE TIME DAILY   Indications: high blood pressure    atorvastatin (LIPITOR) 10 mg tablet TAKE 1 TABLET BY MOUTH ONE TIME DAILY FOR CHOLESTEROL    lisinopril (PRINIVIL, ZESTRIL) 10 mg tablet Take 1 Tab by mouth daily. For blood pressure.  multivitamin (ONE A DAY) tablet Take 1 Tab by mouth daily.  ibuprofen (MOTRIN) 200 mg tablet Take 200 mg by mouth as needed. No current facility-administered medications for this visit. Objective:      Visit Vitals  /88 (BP 1 Location: Right arm)   Pulse 72   Resp 18   Ht 5' 7\" (1.702 m)   Wt 180 lb 11.2 oz (82 kg)   SpO2 99%   BMI 28.30 kg/m²       Data Review:       Past Medical History:   Diagnosis Date    Allergic rhinitis     Family history of colon cancer     Family history of prostate cancer     Heart palpitations 2011    Event monitor: occ PVC's. Echo nl.  Eval by Dr. Idalia Carranza Hypertension     Lumbar back pain       Past Surgical History:   Procedure Laterality Date    HX COLONOSCOPY  2008    Dr Espino Cons HX COLONOSCOPY  6/12/15    Dr. Charles Brown; diverticulosis, int. hemorrhoids     No Known Allergies   Family History   Problem Relation Age of Onset    Cancer Mother         rectal ca    Diabetes Mother     Other Mother         thyroid issues/unknown type    Cancer Sister       Social History     Socioeconomic History    Marital status:      Spouse name: Not on file    Number of children: Not on file    Years of education: Not on file    Highest education level: Not on file   Occupational History    Not on file   Social Needs    Financial resource strain: Not on file    Food insecurity:     Worry: Not on file     Inability: Not on file    Transportation needs:     Medical: Not on file     Non-medical: Not on file   Tobacco Use    Smoking status: Former Smoker     Packs/day: 0.50     Years: 2.00     Pack years: 1.00     Types: Cigarettes     Last attempt to quit: 1/3/1997     Years since quittin.6    Smokeless tobacco: Former User     Types: Chew     Quit date: 1/3/1996   Substance and Sexual Activity    Alcohol use: Yes     Alcohol/week: 5.8 standard drinks     Types: 7 Standard drinks or equivalent per week     Comment: weekly    Drug use: No    Sexual activity: Not on file   Lifestyle    Physical activity:     Days per week: Not on file     Minutes per session: Not on file    Stress: Not on file   Relationships    Social connections:     Talks on phone: Not on file     Gets together: Not on file     Attends Restoration service: Not on file     Active member of club or organization: Not on file     Attends meetings of clubs or organizations: Not on file     Relationship status: Not on file    Intimate partner violence:     Fear of current or ex partner: Not on file     Emotionally abused: Not on file     Physically abused: Not on file     Forced sexual activity: Not on file   Other Topics Concern    Not on file   Social History Narrative    Not on file       Review of Systems     General: Not Present- Anorexia, Chills, Dietary Changes, Fatigue, Fever, Medication Changes, Night Sweats, Weight Gain > 10lbs. and Weight Loss > 10lbs. .  Skin: Not Present- Bruising and Excessive Sweating. HEENT: Not Present- Headache, Visual Loss and Vertigo.   Respiratory: Not Present- Cough, Decreased Exercise Tolerance, Difficulty Breathing, Snoring and Wheezing. Cardiovascular: Not Present- Abnormal Blood Pressure, Chest Pain, Claudications, Difficulty Breathing On Exertion,  Fainting / Blacking Out, Irregular Heart Beat, Night Cramps, Orthopnea, Palpitations, Paroxysmal Nocturnal Dyspnea, Rapid Heart Rate, Shortness of Breath and   Gastrointestinal: Not Present- Black, Tarry Stool, Bloody Stool, Diarrhea, Hematemesis, Rectal Bleeding and Vomiting. Musculoskeletal: Not Present- Muscle Pain and Muscle Weakness. Neurological: Not Present- Dizziness. Psychiatric: Not Present- Depression. Endocrine: Not Present- Cold Intolerance, Heat Intolerance and Thyroid Problems. Hematology: Not Present- Abnormal Bleeding, Anemia, Blood Clots and Easy Bruising. Physical Exam   The physical exam findings are as follows:       General   Mental Status - Alert. General Appearance - Not in acute distress. Chest and Lung Exam   Inspection: Accessory muscles - No use of accessory muscles in breathing. Auscultation:   Breath sounds: - Normal.      Cardiovascular   Inspection: Jugular vein - Bilateral - Inspection Normal.  Palpation/Percussion:   Apical Impulse: - Normal.  Auscultation: Rhythm - Regular. Heart Sounds - S1 WNL and S2 WNL. No S3 or S4. Murmurs & Other Heart Sounds: Auscultation of the heart reveals - No Murmurs. Carotid arteries - No Carotid bruit. Abdomen   Palpation/Percussion: Palpation and Percussion of the abdomen reveal - No Palpable abdominal masses. Auscultation: Auscultation of the abdomen reveals - Bowel sounds normal.      Neurologic   Mental Status: Affect - normal.  Motor: - Normal. Gait - Normal.      Peripheral Vascular   Upper Extremity: Inspection - Bilateral - No Cyanotic nailbeds or Digital clubbing. Lower Extremity:   Palpation:   Femoral pulse - Rt. [x] normal  [] weak  [] non palpable     Lt.    [x] normal  [] weak  [] non palpable Popliteal pulse - Rt. [x] normal  [] weak  [] non palpable     Lt. [x] normal  [] weak  [] non palpable       Dorsalis pedis pulse - Rt. [x] normal  [] weak  [] non palpable     Lt. [x] normal  [] weak  [] non palpable    Posterior tibia pulse - Rt. [x] normal  [] weak  [] non palpable     Lt. [x] normal  [] weak  [] non palpable                                             Edema:       Rt. Leg  [x]             Lt. Leg  [x]  Telangiectasia & spider veins: Rt. Leg  []             Lt. Leg  []  Varicose veins:   Rt. Leg  [x]             Lt. Leg  [x]   Skin pigmentation:   Rt. Leg  [x]             Lt. Leg  [x]   Healed venous ulcer:   Rt. Leg  []             Lt. Leg  []                                            Assessment:       ICD-10-CM ICD-9-CM    1. Venous insufficiency I87.2 459.81    2. Essential hypertension I10 401.9    3. Hyperlipidemia, unspecified hyperlipidemia type E78.5 272.4        Plan: This is a 52 YO M with pmhx HTN HLD previous smoker (quit 25 yrs ago) is here for test follow up. He reports bilateral leg swelling L>R with tortuous varicose veins. Has been going on for several years but the leg swelling and veins has gotten worse. Also has rare episodes of left leg pain when sitting down. He works as an , stands about 3-4 hrs a day. He uses compression stockings which helps with his symptoms. He works out at a [employer] at least 3 times a week. Reports leg swelling L>R with tortuous varicose veins. Has been going on for several years but the leg swelling and veins has gotten worse. 1. Venous Insufficiency: Bilateral GSV reflux, right small saphenous vein reflux per LE duplex 8/19. Start compression stockings x 3 months  Continue leg elevation, mild exercise and wt reduction. 2. Normal EF with no significant valvular pathology per echo in 2016  3. BP controlled  4. HLD: 3/18 LDL at 84. On statin.  Labs and lipids per PCP    Continue current care and f/u with us in 3 loly Hernandez NP  9/3/2019      Patient seen and examined by me with nurse practitioner in vascular clinic. I personally performed all components of the history, physical, and medical decision making and agree with the assessment and plan as noted. CEAP class 4 A. Photo taken.      Capri Mo MD

## 2019-09-09 ENCOUNTER — DOCUMENTATION ONLY (OUTPATIENT)
Dept: CARDIOLOGY CLINIC | Age: 54
End: 2019-09-09

## 2019-09-09 NOTE — PROGRESS NOTES
Put pt's compression hose script in mail to him with note that advised I did not hear from him and just placed his script in the mail. Put number on paper if needed.

## 2019-09-24 ENCOUNTER — OFFICE VISIT (OUTPATIENT)
Dept: INTERNAL MEDICINE CLINIC | Facility: CLINIC | Age: 54
End: 2019-09-24

## 2019-09-24 VITALS
TEMPERATURE: 98 F | OXYGEN SATURATION: 99 % | HEART RATE: 85 BPM | DIASTOLIC BLOOD PRESSURE: 82 MMHG | BODY MASS INDEX: 28.25 KG/M2 | SYSTOLIC BLOOD PRESSURE: 123 MMHG | WEIGHT: 180 LBS | HEIGHT: 67 IN | RESPIRATION RATE: 16 BRPM

## 2019-09-24 DIAGNOSIS — Z12.5 SCREENING FOR PROSTATE CANCER: ICD-10-CM

## 2019-09-24 DIAGNOSIS — E78.2 MIXED HYPERLIPIDEMIA: ICD-10-CM

## 2019-09-24 DIAGNOSIS — I10 ESSENTIAL HYPERTENSION: Primary | ICD-10-CM

## 2019-09-24 DIAGNOSIS — Z23 ENCOUNTER FOR IMMUNIZATION: ICD-10-CM

## 2019-09-24 DIAGNOSIS — E66.3 OVERWEIGHT (BMI 25.0-29.9): ICD-10-CM

## 2019-09-24 DIAGNOSIS — I87.2 VENOUS INSUFFICIENCY (CHRONIC) (PERIPHERAL): ICD-10-CM

## 2019-09-24 DIAGNOSIS — E83.52 HYPERCALCEMIA: ICD-10-CM

## 2019-09-24 DIAGNOSIS — J20.9 ACUTE BRONCHITIS, UNSPECIFIED ORGANISM: ICD-10-CM

## 2019-09-24 DIAGNOSIS — R73.03 PREDIABETES: ICD-10-CM

## 2019-09-24 RX ORDER — AMOXICILLIN AND CLAVULANATE POTASSIUM 875; 125 MG/1; MG/1
TABLET, FILM COATED ORAL
COMMUNITY
Start: 2019-09-15 | End: 2020-02-13

## 2019-09-24 NOTE — PROGRESS NOTES
CC:   Chief Complaint   Patient presents with    Hypertension    Immunization/Injection       HISTORY OF PRESENT ILLNESS  Chris Nolen III is a 53 y.o. male. Presents for 6 month follow up evaluation. He has HTN, hyperlipidemia, prediabetes, heart palpitations, allergic rhinitis, family history of prostate cancer, and family history of colon cancer. Saw Dr. Tiburcio Carter on 9/3/19 for leg swelling and varicose veins. Had LE Duplex 8/26/19: bilateral greater saphenous vein reflux. Instructed to wear compression stockings, continue leg elevation, exercise, and weight reduction; to follow up in 3 months. Complains of cough and sore throat. Was seen at [facility] about a week ago and diagnosed with acute bronchitis. Still taking Augmentin and Robitussin for cough. Symptoms improving. Cardiovascular Review  The patient has hypertension and hyperlipidemia. Infrequent heart palpitations. Denies CP, SOB, or dizziness. He reports taking medications as instructed, no medication side effects noted. Diet and Lifestyle: generally follows a low fat low cholesterol diet, generally follows a low sodium diet, exercises regularly. Soc Hx  . Has 2 elementary age children (ages 9 and 6). Vivi Heal works as the [employer].  Job is stressful.  Former smoker; quit in 1997 (smoked 1/2 ppd for 2 yrs).  Drinks 1/2 to 1 glass of wine with dinner.  Denies recreational drug use.   Exercises regularly at a gym 4 times a week.      Health Maintenance  Flu vaccine: 9/24/19                                                         Tetanus vaccine: Tdap 10/4/12                                    Colonoscopy: 6/12/15 (diverticulosis, grade 1 internal hemorrhoids); next due in 10 yrs (2020)  Eye exam: Fall 2015 ([facility])                  ROS  A complete review of systems was performed and is negative except for those mentioned in the HPI.      Patient Active Problem List   Diagnosis Code    Family history of colon cancer Z80.0    Family history of prostate cancer Z80.42    Palpitations R00.2    Essential hypertension I10    Hyperlipidemia E78.5    Allergic rhinitis J30.9    Overweight (BMI 25.0-29. 9) E66.3    Right inguinal hernia K40.90    Venous insufficiency (chronic) (peripheral) I87.2     Past Medical History:   Diagnosis Date    Allergic rhinitis     Family history of colon cancer     Family history of prostate cancer     Heart palpitations 2011    Event monitor: occ PVC's. Echo nl. Eval by Dr. Alan Dugan Hypertension     Low back pain 4/3/2012    Began  Mid 2011 located in the lower back without radiation. Worse in the AM getting out of bed , not constant, better with ibuprofen. Does a lot of lifting, kids and stones last summer. No sciatica or leg weakness      Lumbar back pain      No Known Allergies    Current Outpatient Medications   Medication Sig Dispense Refill    amoxicillin-clavulanate (AUGMENTIN) 875-125 mg per tablet       hydroCHLOROthiazide (MICROZIDE) 12.5 mg capsule TAKE ONE CAPSULE BY MOUTH ONE TIME DAILY   Indications: high blood pressure 90 Cap 3    atorvastatin (LIPITOR) 10 mg tablet TAKE 1 TABLET BY MOUTH ONE TIME DAILY FOR CHOLESTEROL 90 Tab 3    lisinopril (PRINIVIL, ZESTRIL) 10 mg tablet Take 1 Tab by mouth daily. For blood pressure. 90 Tab 3    multivitamin (ONE A DAY) tablet Take 1 Tab by mouth daily.  ibuprofen (MOTRIN) 200 mg tablet Take 200 mg by mouth as needed. PHYSICAL EXAM  Visit Vitals  /82 (BP 1 Location: Left arm, BP Patient Position: Sitting)   Pulse 85   Temp 98 °F (36.7 °C) (Oral)   Resp 16   Ht 5' 7\" (1.702 m)   Wt 180 lb (81.6 kg)   SpO2 99%   BMI 28.19 kg/m²   2 lb weight decrease since last clinic visit    General: Well-developed and well-nourished, no distress. HEENT:  Head normocephalic/atraumatic, no scleral icterus  Neck: Supple. No carotid bruits, JVD, lymphadenopathy, or thyromegaly.   Lungs:  Clear to ausculation bilaterally. Good air movement. Heart:  Regular rate and rhythm, normal S1 and S2, no murmur, gallop, or rub  Extremities: No clubbing, cyanosis, or edema. Wearing compression stocking bilaterally. Neurological: Alert and oriented. Non-focal exam.  Psychiatric: Normal mood and affect. Behavior is normal.         ASSESSMENT AND PLAN    ICD-10-CM ICD-9-CM    1. Essential hypertension I01 590.0 METABOLIC PANEL, COMPREHENSIVE      CBC WITH AUTOMATED DIFF   2. Acute bronchitis, unspecified organism J20.9 466.0    3. Mixed hyperlipidemia E78.2 272.2 TSH RFX ON ABNORMAL TO FREE T4      LIPID PANEL   4. Venous insufficiency (chronic) (peripheral) I87.2 459.81    5. Overweight (BMI 25.0-29. 9) E66.3 278.02    6. Prediabetes R73.03 790.29 HEMOGLOBIN A1C WITH EAG   7. Screening for prostate cancer Z12.5 V76.44 PSA SCREENING (SCREENING)   8. Hypercalcemia E83.52 275.42 PTH INTACT   9. Encounter for immunization Z23 V03.89 INFLUENZA VIRUS VAC QUAD,SPLIT,PRESV FREE SYRINGE IM      NV IMMUNIZ ADMIN,1 SINGLE/COMB VAC/TOXOID     Diagnoses and all orders for this visit:    1. Essential hypertension  Controlled. Continue lisinopril 10 mg and HCTZ 12. 5 mg daily.  -     METABOLIC PANEL, COMPREHENSIVE; Future  -     CBC WITH AUTOMATED DIFF; Future    2. Acute bronchitis, unspecified organism  Improving. Continue Augmentin and Robitussin. 3. Mixed hyperlipidemia  Controlled on atorvastatin 10 mg daily.  -     TSH RFX ON ABNORMAL TO FREE T4; Future  -     LIPID PANEL; Future    4. Venous insufficiency (chronic) (peripheral)  Continue compression stockings and regular exercise. 5. Overweight (BMI 25.0-29. 9)  Discussed the patient's BMI with him. The BMI follow up plan is as follows: dietary management education, guidance, and counseling; encourage exercise; monitor weight; prescribed dietary intake. Follow up BMI in 6 months. 6. Prediabetes  -     HEMOGLOBIN A1C WITH EAG; Future    7.  Screening for prostate cancer  -     PSA SCREENING (SCREENING); Future    8. Hypercalcemia  Mildly elevated Ca levels in the past.  3/12/19: Ca 10.4, 3/20/18: Ca 10.4. Rule out hyperparathyroidism.  -     PTH INTACT; Future    9. Encounter for immunization  -     INFLUENZA VIRUS VAC QUAD,SPLIT,PRESV FREE SYRINGE IM  -     MS IMMUNIZ ADMIN,1 SINGLE/COMB VAC/TOXOID      Follow-up and Dispositions    · Return in about 6 months (around 3/24/2020), or if symptoms worsen or fail to improve, for HTN, hyperlipidemia; schedule fasting labs 1 week prior to appointment. I have discussed the diagnosis with the patient and the intended plan as seen in the above orders. Patient is in agreement. The patient has received an after-visit summary and questions were answered concerning future plans. I have discussed medication side effects and warnings with the patient as well.

## 2019-09-24 NOTE — PATIENT INSTRUCTIONS
Vaccine Information Statement Influenza (Flu) Vaccine (Inactivated or Recombinant): What You Need to Know Many Vaccine Information Statements are available in [place]ian and other languages. See www.immunize.org/vis Hojas de información sobre vacunas están disponibles en español y en muchos otros idiomas. Visite www.immunize.org/vis 1. Why get vaccinated? Influenza vaccine can prevent influenza (flu). Flu is a contagious disease that spreads around the [facility] every year, usually between October and May. Anyone can get the flu, but it is more dangerous for some people. Infants and young children, people 72years of age and older, pregnant women, and people with certain health conditions or a weakened immune system are at greatest risk of flu complications. Pneumonia, bronchitis, sinus infections and ear infections are examples of flu-related complications. If you have a medical condition, such as heart disease, cancer or diabetes, flu can make it worse. Flu can cause fever and chills, sore throat, muscle aches, fatigue, cough, headache, and runny or stuffy nose. Some people may have vomiting and diarrhea, though this is more common in children than adults. Each year thousands of people in the [facility] die from flu, and many more are hospitalized. Flu vaccine prevents millions of illnesses and flu-related visits to the doctor each year. 2. Influenza vaccines CDC recommends everyone 10months of age and older get vaccinated every flu season. Children 6 months through 6years of age may need 2 doses during a single flu season. Everyone else needs only 1 dose each flu season. It takes about 2 weeks for protection to develop after vaccination. There are many flu viruses, and they are always changing. Each year a new flu vaccine is made to protect against three or four viruses that are likely to cause disease in the upcoming flu season.  Even when the vaccine doesnt exactly match these viruses, it may still provide some protection. Influenza vaccine does not cause flu. Influenza vaccine may be given at the same time as other vaccines. 3. Talk with your health care provider Tell your vaccine provider if the person getting the vaccine: 
 Has had an allergic reaction after a previous dose of influenza vaccine, or has any severe, life-threatening allergies.  Has ever had Guillain-Barré Syndrome (also called GBS). In some cases, your health care provider may decide to postpone influenza vaccination to a future visit. People with minor illnesses, such as a cold, may be vaccinated. People who are moderately or severely ill should usually wait until they recover before getting influenza vaccine. Your health care provider can give you more information. 4. Risks of a reaction  Soreness, redness, and swelling where shot is given, fever, muscle aches, and headache can happen after influenza vaccine.  There may be a very small increased risk of Guillain-Barré Syndrome (GBS) after inactivated influenza vaccine (the flu shot). [facility] children who get the flu shot along with pneumococcal vaccine (PCV13), and/or DTaP vaccine at the same time might be slightly more likely to have a seizure caused by fever. Tell your health care provider if a child who is getting flu vaccine has ever had a seizure. People sometimes faint after medical procedures, including vaccination. Tell your provider if you feel dizzy or have vision changes or ringing in the ears. As with any medicine, there is a very remote chance of a vaccine causing a severe allergic reaction, other serious injury, or death. 5. What if there is a serious problem? An allergic reaction could occur after the vaccinated person leaves the clinic.  If you see signs of a severe allergic reaction (hives, swelling of the face and throat, difficulty breathing, a fast heartbeat, dizziness, or weakness), call 9-1-1 and get the person to the nearest hospital. 
 
For other signs that concern you, call your health care provider. Adverse reactions should be reported to the Vaccine Adverse Event Reporting System (VAERS). Your health care provider will usually file this report, or you can do it yourself. Visit the VAERS website at www.vaers. hhs.gov or call 4-260.582.9045. VAERS is only for reporting reactions, and VAERS staff do not give medical advice. 6. The National Vaccine Injury Compensation Program 
 
The [facility] Vaccine Injury Compensation Program (VICP) is a federal program that was created to compensate people who may have been injured by certain vaccines. Visit the VICP website at www.hrsa.gov/vaccinecompensation or call 6-239.479.3774 to learn about the program and about filing a claim. There is a time limit to file a claim for compensation. 7. How can I learn more?  Ask your health care provider.  Call your local or state health department.  Contact the Centers for Disease Control and Prevention (CDC): 
- Call 0-463.411.3124 (1-800-CDC-INFO) or 
- Visit CDCs influenza website at www.cdc.gov/flu Vaccine Information Statement (Interim) Inactivated Influenza Vaccine 8/15/2019 
[address]TER Su [id number]-[id number] Department of [facility] and [employer] Centers for Disease Control and Prevention Office Use Only

## 2019-09-24 NOTE — PROGRESS NOTES
Alex Orn III  Identified pt with two pt identifiers(name and ). Chief Complaint   Patient presents with    Hypertension    Immunization/Injection   Influenza vaccine 0.5 ml given left deltoid IM. VIS given. [facility]  Patient tolerated injection with no complications. 1. Have you been to the ER, urgent care clinic since your last visit? Hospitalized since your last visit? [facility] for cough    2. Have you seen or consulted any other health care providers outside of the [address] [place] [address] since your last visit? Include any pap smears or colon screening. Dr Shyam Sommer provider has been notified of reason for visit, vitals and flowsheets obtained on patients.      Patient received paperwork for advance directive during previous visit but has not completed at this time     Reviewed record In preparation for visit, huddled with provider and have obtained necessary documentation      Health Maintenance Due   Topic    Influenza Age 5 to Adult        Wt Readings from Last 3 Encounters:   19 180 lb (81.6 kg)   19 180 lb 11.2 oz (82 kg)   19 182 lb (82.6 kg)     Temp Readings from Last 3 Encounters:   19 98 °F (36.7 °C) (Oral)   19 98 °F (36.7 °C) (Oral)   19 98 °F (36.7 °C) (Oral)     BP Readings from Last 3 Encounters:   19 123/82   19 130/88   19 127/74     Pulse Readings from Last 3 Encounters:   19 85   19 72   19 (!) 58     Vitals:    19 0806   BP: 123/82   Pulse: 85   Resp: 16   Temp: 98 °F (36.7 °C)   TempSrc: Oral   SpO2: 99%   Weight: 180 lb (81.6 kg)   Height: 5' 7\" (1.702 m)   PainSc:   0 - No pain         Learning Assessment:  :     Learning Assessment 2014   PRIMARY LEARNER Patient   HIGHEST LEVEL OF EDUCATION - PRIMARY LEARNER  4 YEARS OF COLLEGE   BARRIERS PRIMARY LEARNER NONE   CO-LEARNER CAREGIVER No   PRIMARY LANGUAGE ENGLISH   LEARNER PREFERENCE PRIMARY DEMONSTRATION   ANSWERED BY patient RELATIONSHIP SELF       Depression Screening:  :     3 most recent PHQ Screens 3/20/2019   Little interest or pleasure in doing things Not at all   Feeling down, depressed, irritable, or hopeless Not at all   Total Score PHQ 2 0       Fall Risk Assessment:  :     Fall Risk Assessment, last 12 mths 10/14/2016   Able to walk? Yes   Fall in past 12 months? No       Abuse Screening:  :     No flowsheet data found. ADL Screening:  :     ADL Assessment 3/20/2018   Feeding yourself No Help Needed   Getting from bed to chair No Help Needed   Getting dressed No Help Needed   Bathing or showering No Help Needed   Walk across the room (includes cane/walker) No Help Needed   Using the telphone No Help Needed   Taking your medications No Help Needed   Preparing meals No Help Needed   Managing money (expenses/bills) No Help Needed   Moderately strenuous housework (laundry) No Help Needed   Shopping for personal items (toiletries/medicines) No Help Needed   Shopping for groceries No Help Needed   Driving No Help Needed   Climbing a flight of stairs No Help Needed   Getting to places beyond walking distances No Help Needed                 Medication reconciliation up to date and corrected with patient at this time.

## 2019-10-02 NOTE — TELEPHONE ENCOUNTER
From: So Yeh III  To: Liliya Vargas MD  Sent: 8/11/2017 12:26 PM EDT  Subject: Medication Renewal Request    Original authorizing provider: MD So Hendrickson III would like a refill of the following medications:  atorvastatin (LIPITOR) 10 mg tablet Liliya Vargas MD]    Preferred pharmacy: [facility]    Comment:  moving from [facility] to the [facility] [employer].  Would love to have a 90 prescription Double O-Z Plasty Text: The defect edges were debeveled with a #15 scalpel blade.  Given the location of the defect, shape of the defect and the proximity to free margins a Double O-Z plasty (double transposition flap) was deemed most appropriate.  Using a sterile surgical marker, the appropriate transposition flaps were drawn incorporating the defect and placing the expected incisions within the relaxed skin tension lines where possible. The area thus outlined was incised deep to adipose tissue with a #15 scalpel blade.  The skin margins were undermined to an appropriate distance in all directions utilizing iris scissors.  Hemostasis was achieved with electrocautery.  The flaps were then transposed into place, one clockwise and the other counterclockwise, and anchored with interrupted buried subcutaneous sutures.

## 2019-12-30 DIAGNOSIS — I10 ESSENTIAL HYPERTENSION WITH GOAL BLOOD PRESSURE LESS THAN 140/90: ICD-10-CM

## 2019-12-30 DIAGNOSIS — E78.2 MIXED HYPERLIPIDEMIA: ICD-10-CM

## 2019-12-30 RX ORDER — ATORVASTATIN CALCIUM 10 MG/1
TABLET, FILM COATED ORAL
Qty: 90 TAB | Refills: 3 | Status: SHIPPED | OUTPATIENT
Start: 2019-12-30 | End: 2021-03-01

## 2019-12-30 RX ORDER — HYDROCHLOROTHIAZIDE 12.5 MG/1
CAPSULE ORAL
Qty: 90 CAP | Refills: 3 | Status: SHIPPED | OUTPATIENT
Start: 2019-12-30 | End: 2020-03-12 | Stop reason: SDUPTHER

## 2019-12-30 RX ORDER — LISINOPRIL 10 MG/1
TABLET ORAL
Qty: 90 TAB | Refills: 3 | Status: SHIPPED | OUTPATIENT
Start: 2019-12-30 | End: 2020-09-18

## 2020-01-14 ENCOUNTER — TELEPHONE (OUTPATIENT)
Dept: INTERNAL MEDICINE CLINIC | Facility: CLINIC | Age: 55
End: 2020-01-14

## 2020-01-14 DIAGNOSIS — Z87.891 FORMER SMOKER: ICD-10-CM

## 2020-01-14 DIAGNOSIS — Z13.6 SCREENING FOR AAA (ABDOMINAL AORTIC ANEURYSM): ICD-10-CM

## 2020-01-14 DIAGNOSIS — I10 ESSENTIAL HYPERTENSION: Primary | ICD-10-CM

## 2020-02-13 ENCOUNTER — OFFICE VISIT (OUTPATIENT)
Dept: URGENT CARE | Age: 55
End: 2020-02-13

## 2020-02-13 VITALS
SYSTOLIC BLOOD PRESSURE: 110 MMHG | RESPIRATION RATE: 17 BRPM | BODY MASS INDEX: 28.25 KG/M2 | OXYGEN SATURATION: 98 % | HEIGHT: 67 IN | WEIGHT: 180 LBS | DIASTOLIC BLOOD PRESSURE: 69 MMHG | TEMPERATURE: 98.6 F | HEART RATE: 80 BPM

## 2020-02-13 DIAGNOSIS — H61.23 BILATERAL IMPACTED CERUMEN: ICD-10-CM

## 2020-02-13 DIAGNOSIS — J02.9 SORE THROAT: ICD-10-CM

## 2020-02-13 DIAGNOSIS — J06.9 VIRAL UPPER RESPIRATORY INFECTION: Primary | ICD-10-CM

## 2020-02-13 LAB
FLUAV+FLUBV AG NOSE QL IA.RAPID: NEGATIVE POS/NEG
FLUAV+FLUBV AG NOSE QL IA.RAPID: NEGATIVE POS/NEG
S PYO AG THROAT QL: NEGATIVE
VALID INTERNAL CONTROL?: YES
VALID INTERNAL CONTROL?: YES

## 2020-02-13 NOTE — PATIENT INSTRUCTIONS
Earwax Blockage: Care Instructions  Your Care Instructions    Earwax is a natural substance that protects the ear canal. Normally, earwax drains from the ears and does not cause problems. Sometimes earwax builds up and hardens. Earwax blockage (also called cerumen impaction) can cause some loss of hearing and pain. When wax is tightly packed, you will need to have your doctor remove it. Follow-up care is a key part of your treatment and safety. Be sure to make and go to all appointments, and call your doctor if you are having problems. It's also a good idea to know your test results and keep a list of the medicines you take. How can you care for yourself at home? · Do not try to remove earwax with cotton swabs, fingers, or other objects. This can make the blockage worse and damage the eardrum. · If your doctor recommends that you try to remove earwax at home:  ? Soften and loosen the earwax with warm mineral oil. You also can try hydrogen peroxide mixed with an equal amount of room temperature water. Place 2 drops of the fluid, warmed to body temperature, in the ear two times a day for up to 5 days. ? Once the wax is loose and soft, all that is usually needed to remove it from the ear canal is a gentle, warm shower. Direct the water into the ear, then tip your head to let the earwax drain out. Dry your ear thoroughly with a hair dryer set on low. Hold the dryer several inches from your ear. ? If the warm mineral oil and shower do not work, use an over-the-counter wax softener. Read and follow all instructions on the label. After using the wax softener, use an ear syringe to gently flush the ear. Make sure the flushing solution is body temperature. Cool or hot fluids in the ear can cause dizziness. When should you call for help? Call your doctor now or seek immediate medical care if:    · Pus or blood drains from your ear.     · Your ears are ringing or feel full.     · You have a loss of hearing.  Watch closely for changes in your health, and be sure to contact your doctor if:    · You have pain or reduced hearing after 1 week of home treatment.     · You have any new symptoms, such as nausea or balance problems. Where can you learn more? Go to http://manuela-avtar.info/. Enter L585 in the search box to learn more about \"Earwax Blockage: Care Instructions. \"  Current as of: June 26, 2019  Content Version: 12.2  © 3721-8286 [employer]. Care instructions adapted under license by [employer] (which disclaims liability or warranty for this information). If you have questions about a medical condition or this instruction, always ask your healthcare professional. [place] [address] any warranty or liability for your use of this information. Viral Respiratory Infection: Care Instructions  Your Care Instructions    Viruses are very small organisms. They grow in number after they enter your body. There are many types that cause different illnesses, such as colds and the mumps. The symptoms of a viral respiratory infection often start quickly. They include a fever, sore throat, and runny nose. You may also just not feel well. Or you may not want to eat much. Most viral respiratory infections are not serious. They usually get better with time and self-care. Antibiotics are not used to treat a viral infection. That's because antibiotics will not help cure a viral illness. In some cases, antiviral medicine can help your body fight a serious viral infection. Follow-up care is a key part of your treatment and safety. Be sure to make and go to all appointments, and call your doctor if you are having problems. It's also a good idea to know your test results and keep a list of the medicines you take. How can you care for yourself at home? · Rest as much as possible until you feel better. · Be safe with medicines.  Take your medicine exactly as prescribed. Call your doctor if you think you are having a problem with your medicine. You will get more details on the specific medicine your doctor prescribes. · Take an over-the-counter pain medicine, such as acetaminophen (Tylenol), ibuprofen (Advil, Motrin), or naproxen (Aleve), as needed for pain and fever. Read and follow all instructions on the label. Do not give aspirin to anyone younger than 20. It has been linked to Reye syndrome, a serious illness. · Drink plenty of fluids, enough so that your urine is light yellow or clear like water. Hot fluids, such as tea or soup, may help relieve congestion in your nose and throat. If you have kidney, heart, or liver disease and have to limit fluids, talk with your doctor before you increase the amount of fluids you drink. · Try to clear mucus from your lungs by breathing deeply and coughing. · Gargle with warm salt water once an hour. This can help reduce swelling and throat pain. Use 1 teaspoon of salt mixed in 1 cup of warm water. · Do not smoke or allow others to smoke around you. If you need help quitting, talk to your doctor about stop-smoking programs and medicines. These can increase your chances of quitting for good. To avoid spreading the virus  · Cough or sneeze into a tissue. Then throw the tissue away. · If you don't have a tissue, use your hand to cover your cough or sneeze. Then clean your hand. You can also cough into your sleeve. · Wash your hands often. Use soap and warm water. Wash for 15 to 20 seconds each time. · If you don't have soap and water near you, you can clean your hands with alcohol wipes or gel. When should you call for help?   Call your doctor now or seek immediate medical care if:    · You have a new or higher fever.     · Your fever lasts more than 48 hours.     · You have trouble breathing.     · You have a fever with a stiff neck or a severe headache.     · You are sensitive to light.     · You feel very sleepy or confused.    Watch closely for changes in your health, and be sure to contact your doctor if:    · You do not get better as expected. Where can you learn more? Go to http://manuela-avtar.info/. Enter Q326 in the search box to learn more about \"Viral Respiratory Infection: Care Instructions. \"  Current as of: June 9, 2019  Content Version: 12.2  © 9907-3753 [employer]. Care instructions adapted under license by [employer] (which disclaims liability or warranty for this information). If you have questions about a medical condition or this instruction, always ask your healthcare professional. [place] [address] any warranty or liability for your use of this information.

## 2020-02-13 NOTE — PROGRESS NOTES
Cold Symptoms   The history is provided by the patient. This is a new problem. The current episode started 2 days ago. The problem occurs constantly. The problem has not changed since onset. The cough is non-productive. There has been no fever. Associated symptoms include rhinorrhea and myalgias. Pertinent negatives include no chest pain, no chills, no sweats, no ear congestion, no ear pain, no headaches, no sore throat, no shortness of breath and no wheezing. He has tried nothing for the symptoms. Risk factors: ill contacts at home. Past Medical History:   Diagnosis Date    Allergic rhinitis     Family history of colon cancer     Family history of prostate cancer     Heart palpitations 2011    Event monitor: occ PVC's. Echo nl. Eval by Dr. Kenia Raphael Hypertension     Low back pain 4/3/2012    Began  Mid 2011 located in the lower back without radiation. Worse in the AM getting out of bed , not constant, better with ibuprofen. Does a lot of lifting, kids and stones last summer.  No sciatica or leg weakness      Lumbar back pain         Past Surgical History:   Procedure Laterality Date    HX COLONOSCOPY  2008    Dr Dustin Sweeney HX COLONOSCOPY  6/12/15    Dr. Italo Juarez; diverticulosis, int. hemorrhoids         Family History   Problem Relation Age of Onset    Cancer Mother         rectal ca    Diabetes Mother     Other Mother         thyroid issues/unknown type    Cancer Sister         Social History     Socioeconomic History    Marital status:      Spouse name: Not on file    Number of children: Not on file    Years of education: Not on file    Highest education level: Not on file   Occupational History    Not on file   Social Needs    Financial resource strain: Not on file    Food insecurity:     Worry: Not on file     Inability: Not on file    Transportation needs:     Medical: Not on file     Non-medical: Not on file   Tobacco Use    Smoking status: Former Smoker     Packs/day: 0.50 Years: 2.00     Pack years: 1.00     Types: Cigarettes     Last attempt to quit: 1/3/1997     Years since quittin.1    Smokeless tobacco: Former User     Types: Chew     Quit date: 1/3/1996   Substance and Sexual Activity    Alcohol use: Yes     Alcohol/week: 5.8 standard drinks     Types: 7 Standard drinks or equivalent per week     Comment: weekly    Drug use: No    Sexual activity: Not on file   Lifestyle    Physical activity:     Days per week: Not on file     Minutes per session: Not on file    Stress: Not on file   Relationships    Social connections:     Talks on phone: Not on file     Gets together: Not on file     Attends Faith service: Not on file     Active member of club or organization: Not on file     Attends meetings of clubs or organizations: Not on file     Relationship status: Not on file    Intimate partner violence:     Fear of current or ex partner: Not on file     Emotionally abused: Not on file     Physically abused: Not on file     Forced sexual activity: Not on file   Other Topics Concern    Not on file   Social History Narrative    Not on file                ALLERGIES: Patient has no known allergies. Review of Systems   Constitutional: Negative for activity change, appetite change, chills and fever. HENT: Positive for congestion and rhinorrhea. Negative for ear pain and sore throat. Respiratory: Positive for cough. Negative for shortness of breath and wheezing. Cardiovascular: Negative for chest pain and palpitations. Musculoskeletal: Positive for myalgias. Skin: Negative for rash. Neurological: Negative for headaches. Hematological: Negative for adenopathy. Vitals:    20 1414   BP: 110/69   Pulse: 80   Resp: 17   Temp: 98.6 °F (37 °C)   SpO2: 98%   Weight: 180 lb (81.6 kg)   Height: 5' 7\" (1.702 m)       Physical Exam  Vitals signs and nursing note reviewed. Constitutional:       General: He is not in acute distress.      Appearance: He is well-developed. He is not diaphoretic. HENT:      Right Ear: Tympanic membrane, ear canal and external ear normal. There is impacted cerumen (s/p irrigation). Left Ear: Tympanic membrane, ear canal and external ear normal. There is impacted cerumen (s/p irrigation). Nose: Congestion and rhinorrhea present. Right Sinus: No maxillary sinus tenderness or frontal sinus tenderness. Left Sinus: No maxillary sinus tenderness or frontal sinus tenderness. Mouth/Throat:      Pharynx: Posterior oropharyngeal erythema present. No oropharyngeal exudate. Tonsils: No tonsillar abscesses. Cardiovascular:      Rate and Rhythm: Normal rate and regular rhythm. Heart sounds: Normal heart sounds. Pulmonary:      Effort: Pulmonary effort is normal. No respiratory distress. Breath sounds: Normal breath sounds. No wheezing or rales. Lymphadenopathy:      Cervical: No cervical adenopathy. Neurological:      Mental Status: He is alert. Psychiatric:         Behavior: Behavior normal.         Thought Content: Thought content normal.         Judgment: Judgment normal.         [facility]    ICD-10-CM ICD-9-CM   1. Viral upper respiratory infection J06.9 465.9   2. Sore throat J02.9 462   3. Bilateral impacted cerumen H61.23 380.4       Orders Placed This Encounter    REMOVE IMPACTED EAR WAX    UPPER RESPIRATORY CULTURE    AMB POC LEBRON INFLUENZA A/B TEST    AMB POC RAPID STREP A      Fluids, tylenol prn  The patient is to follow up with PCP prn. If signs and symptoms become worse the pt is to go to the ER.        Results for orders placed or performed in visit on 02/13/20   AMB POC LEBRON INFLUENZA A/B TEST   Result Value Ref Range    VALID INTERNAL CONTROL POC Yes     Influenza A Ag POC Negative Negative Pos/Neg    Influenza B Ag POC Negative Negative Pos/Neg   AMB POC RAPID STREP A   Result Value Ref Range    VALID INTERNAL CONTROL POC Yes     Group A Strep Ag Negative Negative Procedures

## 2020-02-16 LAB — BACTERIA SPEC RESP CULT: NORMAL

## 2020-02-20 ENCOUNTER — TELEPHONE (OUTPATIENT)
Dept: INTERNAL MEDICINE CLINIC | Facility: CLINIC | Age: 55
End: 2020-02-20

## 2020-02-20 DIAGNOSIS — R06.83 LOUD SNORING: Primary | ICD-10-CM

## 2020-02-20 NOTE — TELEPHONE ENCOUNTER
I placed a Sleep Medicine referral for him today. Please send a message to him by My Chat regarding the number to call if he does not hear from anyone within 48 hrs to schedule an appointment.

## 2020-03-12 RX ORDER — HYDROCHLOROTHIAZIDE 12.5 MG/1
CAPSULE ORAL
Qty: 90 CAP | Refills: 3 | Status: SHIPPED | OUTPATIENT
Start: 2020-03-12 | End: 2020-12-26

## 2020-08-01 ENCOUNTER — OFFICE VISIT (OUTPATIENT)
Dept: URGENT CARE | Age: 55
End: 2020-08-01

## 2020-08-01 VITALS — RESPIRATION RATE: 18 BRPM | HEART RATE: 98 BPM | OXYGEN SATURATION: 100 % | TEMPERATURE: 98.7 F

## 2020-08-01 DIAGNOSIS — J02.9 PHARYNGITIS, UNSPECIFIED ETIOLOGY: Primary | ICD-10-CM

## 2020-08-01 LAB
S PYO AG THROAT QL: NEGATIVE
VALID INTERNAL CONTROL?: YES

## 2020-08-01 NOTE — PROGRESS NOTES
64 yo AAM with PMH of longstanding allergic rhinitis, hypertension, low back pain. Presents to  with cc of longstanding sore throat. He reports that he had a coworker who tested positive for strep. He has a cough related to mild post nasal drip. He reports that sometimes the symptoms are worse in the mornings. He has allergies and takes flonase and an antihistamine to control these. He does not like taking a lot of medications. He has no concern for COVID exposure. Past Medical History:   Diagnosis Date    Allergic rhinitis     Family history of colon cancer     Family history of prostate cancer     Heart palpitations 2011    Event monitor: occ PVC's. Echo nl. Eval by Dr. Keke Chavarria Hypertension     Low back pain 4/3/2012    Began  Mid 2011 located in the lower back without radiation. Worse in the AM getting out of bed , not constant, better with ibuprofen. Does a lot of lifting, kids and stones last summer.  No sciatica or leg weakness      Lumbar back pain         Past Surgical History:   Procedure Laterality Date    HX COLONOSCOPY  2008    Dr Saleem Braun HX COLONOSCOPY  6/12/15    Dr. Odilia An; diverticulosis, int. hemorrhoids         Family History   Problem Relation Age of Onset    Cancer Mother         rectal ca    Diabetes Mother     Other Mother         thyroid issues/unknown type    Cancer Sister         Social History     Socioeconomic History    Marital status:      Spouse name: Not on file    Number of children: Not on file    Years of education: Not on file    Highest education level: Not on file   Occupational History    Not on file   Social Needs    Financial resource strain: Not on file    Food insecurity     Worry: Not on file     Inability: Not on file    Transportation needs     Medical: Not on file     Non-medical: Not on file   Tobacco Use    Smoking status: Former Smoker     Packs/day: 0.50     Years: 2.00     Pack years: 1.00     Types: Cigarettes Last attempt to quit: 1/3/1997     Years since quittin.5    Smokeless tobacco: Former User     Types: Chew     Quit date: 1/3/1996   Substance and Sexual Activity    Alcohol use: Yes     Alcohol/week: 5.8 standard drinks     Types: 7 Standard drinks or equivalent per week     Comment: weekly    Drug use: No    Sexual activity: Not on file   Lifestyle    Physical activity     Days per week: Not on file     Minutes per session: Not on file    Stress: Not on file   Relationships    Social connections     Talks on phone: Not on file     Gets together: Not on file     Attends Druze service: Not on file     Active member of club or organization: Not on file     Attends meetings of clubs or organizations: Not on file     Relationship status: Not on file    Intimate partner violence     Fear of current or ex partner: Not on file     Emotionally abused: Not on file     Physically abused: Not on file     Forced sexual activity: Not on file   Other Topics Concern    Not on file   Social History Narrative    Not on file                ALLERGIES: Patient has no known allergies. Review of Systems   Constitutional: Negative. HENT: Positive for postnasal drip and sore throat. Respiratory: Positive for cough. All other systems reviewed and are negative. Vitals:    20 0920   Pulse: 98   Resp: 18   Temp: 98.7 °F (37.1 °C)   SpO2: 100%       Physical Exam  Vitals signs and nursing note reviewed. Constitutional:       Appearance: Normal appearance. He is normal weight. HENT:      Head: Normocephalic and atraumatic. Nose: Rhinorrhea present. Mouth/Throat:      Mouth: Mucous membranes are moist.      Pharynx: Oropharynx is clear. Posterior oropharyngeal erythema present. No oropharyngeal exudate. Neurological:      Mental Status: He is alert.          MDM     Differential Diagnosis; Clinical Impression; Plan:     Mild pharyngitis  Recent Results (from the past 12 hour(s))  -AMB POC RAPID STREP A  Collection Time: 08/01/20  9:34 AM       Result                      Value             Ref Range           VALID INTERNAL CONTROL*     Yes                                   Group A Strep Ag            Negative          Negative         No orders of the defined types were placed in this encounter. Recommend trial of PPI for ? GERD related pharyngitis. He has never tried this before. Recommend to try 2 weeks of PPI OTC and see if symptoms improve, as they are longstanding. The patients condition was discussed with the patient and they understand. The patient is to follow up with PCP. If signs and symptoms become worse the pt is to go to the ER. The patient is to take medications as prescribed.          Procedures

## 2020-09-17 NOTE — PROGRESS NOTES
Will discuss lab results at 9/18/20 appt. Normal kidney and liver tests, blood counts, diabetes screening test, thyroid, and prostate blood test (PSA). Potassium level high, calcium level high. PTH pending. Cholesterol good (LDL 92, , making tot chol 209). Message sent to patient by My Chart:  Your potassium level returned high. If you are having any symptoms of high potassium like heart palpitations, skipped beats, or severe muscle weakness, go to the emergency room. Your high potassium may be due to lisinopril, food, vitamins, or may be a lab error. Foods high in potassium include bananas, nuts, raisins, cantaloupe, potatoes, sweet potatoes, beans, dairy products, avocados, spinach, and broccoli. We will discuss this more tomorrow at your clinic visit.

## 2020-09-18 ENCOUNTER — VIRTUAL VISIT (OUTPATIENT)
Dept: INTERNAL MEDICINE CLINIC | Age: 55
End: 2020-09-18
Payer: COMMERCIAL

## 2020-09-18 DIAGNOSIS — Z80.0 FAMILY HISTORY OF COLON CANCER: ICD-10-CM

## 2020-09-18 DIAGNOSIS — E83.52 HYPERCALCEMIA: ICD-10-CM

## 2020-09-18 DIAGNOSIS — I10 ESSENTIAL HYPERTENSION: Primary | ICD-10-CM

## 2020-09-18 DIAGNOSIS — R00.2 PALPITATIONS: ICD-10-CM

## 2020-09-18 DIAGNOSIS — Z80.42 FAMILY HISTORY OF PROSTATE CANCER: ICD-10-CM

## 2020-09-18 DIAGNOSIS — E78.2 MIXED HYPERLIPIDEMIA: ICD-10-CM

## 2020-09-18 DIAGNOSIS — E87.5 HYPERKALEMIA: ICD-10-CM

## 2020-09-18 LAB
ALBUMIN SERPL-MCNC: 5 G/DL (ref 3.8–4.9)
ALBUMIN/GLOB SERPL: 2.1 {RATIO} (ref 1.2–2.2)
ALP SERPL-CCNC: 55 IU/L (ref 39–117)
ALT SERPL-CCNC: 27 IU/L (ref 0–44)
AST SERPL-CCNC: 27 IU/L (ref 0–40)
BASOPHILS # BLD AUTO: 0 X10E3/UL (ref 0–0.2)
BASOPHILS NFR BLD AUTO: 1 %
BILIRUB SERPL-MCNC: 0.5 MG/DL (ref 0–1.2)
BUN SERPL-MCNC: 15 MG/DL (ref 6–24)
BUN/CREAT SERPL: 13 (ref 9–20)
CALCIUM SERPL-MCNC: 10.4 MG/DL (ref 8.7–10.2)
CHLORIDE SERPL-SCNC: 101 MMOL/L (ref 96–106)
CHOLEST SERPL-MCNC: 209 MG/DL (ref 100–199)
CO2 SERPL-SCNC: 28 MMOL/L (ref 20–29)
CREAT SERPL-MCNC: 1.2 MG/DL (ref 0.76–1.27)
EOSINOPHIL # BLD AUTO: 0.1 X10E3/UL (ref 0–0.4)
EOSINOPHIL NFR BLD AUTO: 1 %
ERYTHROCYTE [DISTWIDTH] IN BLOOD BY AUTOMATED COUNT: 13.3 % (ref 11.6–15.4)
EST. AVERAGE GLUCOSE BLD GHB EST-MCNC: 114 MG/DL
GLOBULIN SER CALC-MCNC: 2.4 G/DL (ref 1.5–4.5)
GLUCOSE SERPL-MCNC: 105 MG/DL (ref 65–99)
HBA1C MFR BLD: 5.6 % (ref 4.8–5.6)
HCT VFR BLD AUTO: 42.6 % (ref 37.5–51)
HDLC SERPL-MCNC: 109 MG/DL
HGB BLD-MCNC: 14.2 G/DL (ref 13–17.7)
IMM GRANULOCYTES # BLD AUTO: 0 X10E3/UL (ref 0–0.1)
IMM GRANULOCYTES NFR BLD AUTO: 0 %
LDLC SERPL CALC-MCNC: 92 MG/DL (ref 0–99)
LYMPHOCYTES # BLD AUTO: 1.6 X10E3/UL (ref 0.7–3.1)
LYMPHOCYTES NFR BLD AUTO: 40 %
MCH RBC QN AUTO: 26.3 PG (ref 26.6–33)
MCHC RBC AUTO-ENTMCNC: 33.3 G/DL (ref 31.5–35.7)
MCV RBC AUTO: 79 FL (ref 79–97)
MONOCYTES # BLD AUTO: 0.3 X10E3/UL (ref 0.1–0.9)
MONOCYTES NFR BLD AUTO: 9 %
NEUTROPHILS # BLD AUTO: 1.9 X10E3/UL (ref 1.4–7)
NEUTROPHILS NFR BLD AUTO: 49 %
PLATELET # BLD AUTO: 297 X10E3/UL (ref 150–450)
POTASSIUM SERPL-SCNC: 6.2 MMOL/L (ref 3.5–5.2)
PROT SERPL-MCNC: 7.4 G/DL (ref 6–8.5)
PSA SERPL-MCNC: 1.3 NG/ML (ref 0–4)
PTH-INTACT SERPL-MCNC: 56 PG/ML (ref 15–65)
RBC # BLD AUTO: 5.39 X10E6/UL (ref 4.14–5.8)
SODIUM SERPL-SCNC: 141 MMOL/L (ref 134–144)
TRIGL SERPL-MCNC: 40 MG/DL (ref 0–149)
TSH SERPL DL<=0.005 MIU/L-ACNC: 0.96 UIU/ML (ref 0.45–4.5)
VLDLC SERPL CALC-MCNC: 8 MG/DL (ref 5–40)
WBC # BLD AUTO: 3.9 X10E3/UL (ref 3.4–10.8)

## 2020-09-18 PROCEDURE — 99214 OFFICE O/P EST MOD 30 MIN: CPT | Performed by: INTERNAL MEDICINE

## 2020-09-18 RX ORDER — AMLODIPINE BESYLATE 5 MG/1
5 TABLET ORAL DAILY
Qty: 90 TAB | Refills: 1 | Status: SHIPPED | OUTPATIENT
Start: 2020-09-18 | End: 2021-02-18

## 2020-09-18 NOTE — PROGRESS NOTES
Carine Chiu III is a 63 y.o. male who was seen by synchronous (real-time) audio-video technology on 9/18/2020 for Labs (Discuss lab results )        Assessment & Plan:     Discussed lab results from 9/16/20 with patient. Normal kidney and liver tests, blood counts, diabetes screening test, thyroid, and prostate blood test (PSA).  Potassium level high, calcium level high.  PTH pending.  Cholesterol good (LDL 92, , making tot chol 209). PTH normal but higher than it was a year ago. Diagnoses and all orders for this visit:    1. Essential hypertension  Previously controlled. Recommended he schedule nursing visit for BP check and flu vaccine. Will stop lisinopril because of hyperkalemia and start on amlodipine.  -     Start amLODIPine (NORVASC) 5 mg tablet; Take 1 Tab by mouth daily. Indications: high blood pressure    2. Mixed hyperlipidemia  Controlled. 3. Hyperkalemia  Stop lisinopril and stay on low potassium diet.  -     POTASSIUM  Message sent to patient by My Chart yesterday:  Your potassium level returned high.  If you are having any symptoms of high potassium like heart palpitations, skipped beats, or severe muscle weakness, go to the emergency room.  Your high potassium may be due to lisinopril, food, vitamins, or may be a lab error.  Foods high in potassium include bananas, nuts, raisins, cantaloupe, potatoes, sweet potatoes, beans, dairy products, avocados, spinach, and broccoli. 4. Hypercalcemia  May be due to HCTZ. Also consider hyperparathyroidism; PTH normal but has been increasing. 5. Family history of prostate cancer  Normal PSA. 6. Family history of colon cancer  Schedule 5 yr follow up screening colonoscopy. Follow-up and Dispositions    · Return in about 6 months (around 3/18/2021), or if symptoms worsen or fail to improve, for HTN, hyperlipidemia; have non-fasting lab (potassium) checked at [facility] in 2-3 wks.            712  Subjective:     Presents for 1 year follow up evaluation. He has HTN, hyperlipidemia, prediabetes, chronic venous insufficiency, allergic rhinitis, family history of prostate cancer, and family history of colon cancer.  No complaints. Saw lab results with my note regarding high potassium level of 6.2. Denies muscle weakness, heart palpitations, or skipped beats. Reports he eats a lot of foods high in potassium including spinach, potatoes, and nuts, but will cut down on these.     Cardiovascular Review  The patient has hypertension and hyperlipidemia.  Infrequent heart palpitations.  Denies SOB, CP, dizziness, or leg swelling. He reports taking medications as instructed, no medication side effects noted.  Diet and Lifestyle: generally follows a low fat low cholesterol diet, generally follows a low sodium diet, exercises regularly.      Soc Hx  . Has 2 elementary age children (ages 6 and 15). [employer] works as the [employer]. Job is stressful. Former smoker; quit in [address] [facility] [place]  (smoked 1/2 ppd for 2 yrs).  Drinks 1/2 to 1 glass of wine with dinner.  Denies recreational drug use. Exercises regularly.      Health Maintenance  Flu vaccine: 9/24/19, plans to get                                                         Tetanus vaccine: Tdap 10/4/12                                    Colonoscopy: 6/12/15 (diverticulosis, grade 1 internal hemorrhoids); next due in 10 yrs (2020), due for this, is in the process of scheduling             ROS  Positive for intermittent weak urinary stream, nasal congestion and postnasal drainage controlled with Flonase nasal spray  A complete review of systems was performed and is negative except for those mentioned in the HPI. Prior to Admission medications    Medication Sig Start Date End Date Taking?  Authorizing Provider   hydroCHLOROthiazide (MICROZIDE) 12.5 mg capsule TAKE 1 CAPSULE ONCE DAILY  FOR HIGH BLOOD PRESSURE 3/12/20  Yes Calista Escalera MD   atorvastatin (LIPITOR) 10 mg tablet TAKE 1 TABLET ONCE DAILY   FOR CHOLESTEROL 12/30/19  Yes Ira Powell MD   lisinopril (PRINIVIL, ZESTRIL) 10 mg tablet TAKE 1 TABLET DAILY FOR    BLOOD PRESSURE 12/30/19  Yes Ira Powell MD   multivitamin (ONE A DAY) tablet Take 1 Tab by mouth daily. Yes Provider, Historical   ibuprofen (MOTRIN) 200 mg tablet Take 200 mg by mouth as needed. Yes Provider, Historical     Patient Active Problem List   Diagnosis Code    Family history of colon cancer Z80.0    Family history of prostate cancer Z80.42    Palpitations R00.2    Essential hypertension I10    Hyperlipidemia E78.5    Allergic rhinitis J30.9    Overweight (BMI 25.0-29. 9) [id number]    Right inguinal hernia K40.90    Venous insufficiency (chronic) (peripheral) I87.2       Objective:     Patient-Reported Vitals 9/18/2020   Patient-Reported Weight 168lb      General: alert, cooperative, no distress   Mental  status: normal mood, behavior, speech, dress, motor activity, and thought processes, able to follow commands   HENT: NCAT   Neck: no visualized mass   Resp: no respiratory distress   Neuro: no gross deficits   Skin: no discoloration or lesions of concern on visible areas   Psychiatric: normal affect, consistent with stated mood, no evidence of hallucinations     Additional exam findings: none      We discussed the expected course, resolution and complications of the diagnosis(es) in detail. Medication risks, benefits, costs, interactions, and alternatives were discussed as indicated. I advised him to contact the office if his condition worsens, changes or fails to improve as anticipated. He expressed understanding with the diagnosis(es) and plan. THIS VISIT WAS COMPLETED VIRTUALLY USING [facility] [address] [place] [address], who was evaluated through a patient-initiated, synchronous (real-time) audio-video encounter, and/or his healthcare decision maker, is aware that it is a billable service, with coverage as determined by his insurance carrier.  He provided verbal consent to proceed: Yes, and patient identification was verified. It was conducted pursuant to the emergency declaration under the [address] [place] [address] and the [employer] General Act. A caregiver was present when appropriate. Ability to conduct physical exam was limited. I was at home. The patient was at home.       Leyda Miranda MD

## 2020-09-18 NOTE — PROGRESS NOTES
Peewee Chino III  Identified pt with two pt identifiers(name and ). Chief Complaint   Patient presents with   [facility] [address] lab results        Reviewed record In preparation for visit and have obtained necessary documentation. 1. Have you been to the ER, urgent care clinic or hospitalized since your last visit? Yes. [facility]    2. Have you seen or consulted any other health care providers outside of the [address] [place] [address] since your last visit? Include any pap smears or colon screening. No    Patient has an advance directive. Vitals reviewed with provider. Health Maintenance reviewed:     Health Maintenance Due   Topic    Shingrix Vaccine Age 49> (1 of 2)    Lipid Screen     Colonoscopy     Flu Vaccine (1)          Wt Readings from Last 3 Encounters:   20 180 lb (81.6 kg)   19 180 lb (81.6 kg)   19 180 lb 11.2 oz (82 kg)        Temp Readings from Last 3 Encounters:   20 98.7 °F (37.1 °C)   20 98.6 °F (37 °C)   19 98 °F (36.7 °C) (Oral)        BP Readings from Last 3 Encounters:   20 110/69   19 123/82   19 130/88        Pulse Readings from Last 3 Encounters:   20 98   20 80   19 85      There were no vitals filed for this visit. Learning Assessment:   :       Learning Assessment 2014   PRIMARY LEARNER Patient   HIGHEST LEVEL OF EDUCATION - PRIMARY LEARNER  4 YEARS OF COLLEGE   BARRIERS PRIMARY LEARNER NONE   CO-LEARNER CAREGIVER No   PRIMARY LANGUAGE ENGLISH   LEARNER PREFERENCE PRIMARY DEMONSTRATION   ANSWERED BY patient   RELATIONSHIP SELF        Depression Screening:   :       3 most recent PHQ Screens 2020   Little interest or pleasure in doing things Not at all   Feeling down, depressed, irritable, or hopeless Not at all   Total Score PHQ 2 0        Fall Risk Assessment:   :       Fall Risk Assessment, last 12 mths 10/14/2016   Able to walk? Yes   Fall in past 12 months?  No        Abuse Screening:   :     No flowsheet data found.      ADL Screening:   :       ADL Assessment 3/20/2018   Feeding yourself No Help Needed   Getting from bed to chair No Help Needed   Getting dressed No Help Needed   Bathing or showering No Help Needed   Walk across the room (includes cane/walker) No Help Needed   Using the telphone No Help Needed   Taking your medications No Help Needed   Preparing meals No Help Needed   Managing money (expenses/bills) No Help Needed   Moderately strenuous housework (laundry) No Help Needed   Shopping for personal items (toiletries/medicines) No Help Needed   Shopping for groceries No Help Needed   Driving No Help Needed   Climbing a flight of stairs No Help Needed   Getting to places beyond walking distances No Help Needed

## 2020-11-30 ENCOUNTER — OFFICE VISIT (OUTPATIENT)
Dept: URGENT CARE | Age: 55
End: 2020-11-30

## 2020-11-30 ENCOUNTER — NURSE TRIAGE (OUTPATIENT)
Dept: OTHER | Facility: CLINIC | Age: 55
End: 2020-11-30

## 2020-11-30 ENCOUNTER — TELEPHONE (OUTPATIENT)
Dept: INTERNAL MEDICINE CLINIC | Age: 55
End: 2020-11-30

## 2020-11-30 NOTE — TELEPHONE ENCOUNTER
Patient called in via [address] [place] [address] to report having symptoms of sore throat. States sore throat has been intermittent since Spring. Rates sore throat as mild. Denies fevers, cough or breathing difficulty at this time. Patient informed of disposition. Caller provided care advice and instructed to call back with worsening symptoms. Patient verbalized understanding and denies any further questions/concerns. Attention provider: Your patient utilized nurse triage services offered by employer, payer or community. This encounter includes an overview of the reason for call, assessment and recommended disposition. Please do not respond through this encounter as the response is not directed to a shared pool. Reason for Disposition   [1] COVID-19 infection suspected by caller or triager AND [2] mild symptoms (cough, fever, or others) AND [2] no complications or SOB    Answer Assessment - Initial Assessment Questions  1. COVID-19 DIAGNOSIS: \"Who made your Coronavirus (COVID-19) diagnosis? \" \"Was it confirmed by a positive lab test?\" If not diagnosed by a HCP, ask \"Are there lots of cases (community spread) where you live? \" (See public health department website, if unsure)      No test    2. COVID-19 EXPOSURE: \"Was there any known exposure to COVID before the symptoms began? \" CDC Definition of close contact: within 6 feet (2 meters) for a total of 15 minutes or more over a 24-hour period. No    3. ONSET: \"When did the COVID-19 symptoms start? \"       Intermittent since spring    4. WORST SYMPTOM: \"What is your worst symptom? \" (e.g., cough, fever, shortness of breath, muscle aches)      Sore throat    5. COUGH: \"Do you have a cough? \" If so, ask: \"How bad is the cough? \"        No    6. FEVER: \"Do you have a fever? \" If so, ask: \"What is your temperature, how was it measured, and when did it start? \"      No    7. RESPIRATORY STATUS: \"Describe your breathing? \" (e.g., shortness of breath, wheezing, unable to speak)       Normal    8. BETTER-SAME-WORSE: Liz Mathiass you getting better, staying the same or getting worse compared to yesterday? \"  If getting worse, ask, \"In what way? \"      Varies    9. HIGH RISK DISEASE: \"Do you have any chronic medical problems? \" (e.g., asthma, heart or lung disease, weak immune system, obesity, etc.)      No    10. PREGNANCY: \"Is there any chance you are pregnant? \" \"When was your last menstrual period? \"        NA    11. OTHER SYMPTOMS: \"Do you have any other symptoms? \"  (e.g., chills, fatigue, headache, loss of smell or taste, muscle pain, sore throat; new loss of smell or taste especially support the diagnosis of COVID-19)        Sore throat    Protocols used: CORONAVIRUS (COVID-19) [facility]AGNOSED OR [facility]USPECTED[facility]

## 2020-12-01 ENCOUNTER — E-VISIT (OUTPATIENT)
Dept: INTERNAL MEDICINE CLINIC | Age: 55
End: 2020-12-01
Payer: COMMERCIAL

## 2020-12-01 DIAGNOSIS — R09.89 RUNNY NOSE: ICD-10-CM

## 2020-12-01 DIAGNOSIS — J02.9 SORE THROAT: Primary | ICD-10-CM

## 2020-12-01 PROCEDURE — 99421 OL DIG E/M SVC 5-10 MIN: CPT | Performed by: INTERNAL MEDICINE

## 2020-12-03 NOTE — TELEPHONE ENCOUNTER
Patient c/o 3-4 day history of sore throat and runny nose with no fevers, chills, or other sxs. Questionnaire reviewed. Received flu vaccine in 9/20. Symptoms most likely due to allergic rhinitis or viral URI. Message sent to patient to take OTC Claritin, Zyrtec, or Allegra and sore throat lozenges or Chloraseptic spray. If no improvement after 10 days, let me know. If you develop fevers, chills, extreme fatigue, or loss of taste or smell, go to urgent care clinic for flu and COVID-19 testing. 5-10 minutes were spent on the digital evaluation and management of this patient.

## 2020-12-26 RX ORDER — HYDROCHLOROTHIAZIDE 12.5 MG/1
CAPSULE ORAL
Qty: 90 CAP | Refills: 3 | Status: SHIPPED | OUTPATIENT
Start: 2020-12-26 | End: 2021-05-12 | Stop reason: ALTCHOICE

## 2021-02-28 DIAGNOSIS — E78.2 MIXED HYPERLIPIDEMIA: ICD-10-CM

## 2021-03-01 DIAGNOSIS — E78.2 MIXED HYPERLIPIDEMIA: ICD-10-CM

## 2021-03-01 RX ORDER — ATORVASTATIN CALCIUM 10 MG/1
TABLET, FILM COATED ORAL
Qty: 90 TAB | Refills: 3 | Status: SHIPPED | OUTPATIENT
Start: 2021-03-01 | End: 2021-03-01 | Stop reason: SDUPTHER

## 2021-03-01 RX ORDER — ATORVASTATIN CALCIUM 10 MG/1
10 TABLET, FILM COATED ORAL DAILY
Qty: 14 TAB | Refills: 0 | Status: SHIPPED | OUTPATIENT
Start: 2021-03-01 | End: 2022-02-22

## 2021-03-01 NOTE — TELEPHONE ENCOUNTER
REFILL     PCP: Sanjay Chiu MD     Last appt: 9/18/2020   Future Appointments   Date Time Provider Josh Paz   3/22/2021  8:30 AM Sanjay Chiu MD [facility] BS AMB        Requested Prescriptions     Pending Prescriptions Disp Refills    atorvastatin (LIPITOR) 10 mg tablet 14 Tab 0     Sig: Take 1 Tab by mouth daily. Patient needs short term lipitor rx sent to [place] while he waits for mail order.

## 2021-03-01 NOTE — TELEPHONE ENCOUNTER
----- Message from Amberly League III sent at 3/1/2021  3:22 PM EST -----  Regarding: RE: Prescription Question  Contact: [phone]  [facility] in [place]. ..that way I can drive through.     Thanks

## 2021-03-17 ENCOUNTER — TELEPHONE (OUTPATIENT)
Dept: INTERNAL MEDICINE CLINIC | Age: 56
End: 2021-03-17

## 2021-03-17 DIAGNOSIS — E83.52 HYPERCALCEMIA: ICD-10-CM

## 2021-03-17 DIAGNOSIS — E66.3 OVERWEIGHT (BMI 25.0-29.9): ICD-10-CM

## 2021-03-17 DIAGNOSIS — E78.5 HYPERLIPIDEMIA, UNSPECIFIED HYPERLIPIDEMIA TYPE: Primary | ICD-10-CM

## 2021-03-17 DIAGNOSIS — I10 ESSENTIAL HYPERTENSION: ICD-10-CM

## 2021-03-17 DIAGNOSIS — Z12.5 SCREENING FOR PROSTATE CANCER: ICD-10-CM

## 2021-03-17 NOTE — TELEPHONE ENCOUNTER
Pt called to r/s upcoming appt to 5/3/21. Pt is requesting to have updated labs completed prior to this visit. Please submit all appropriate lab orders for printing/mailing to patient.

## 2021-05-08 LAB
ALBUMIN SERPL-MCNC: 5.3 G/DL (ref 3.8–4.9)
ALBUMIN/GLOB SERPL: 2 {RATIO} (ref 1.2–2.2)
ALP SERPL-CCNC: 54 IU/L (ref 39–117)
ALT SERPL-CCNC: 19 IU/L (ref 0–44)
AST SERPL-CCNC: 28 IU/L (ref 0–40)
BASOPHILS # BLD AUTO: 0 X10E3/UL (ref 0–0.2)
BASOPHILS NFR BLD AUTO: 1 %
BILIRUB SERPL-MCNC: 1 MG/DL (ref 0–1.2)
BUN SERPL-MCNC: 16 MG/DL (ref 6–24)
BUN/CREAT SERPL: 14 (ref 9–20)
CALCIUM SERPL-MCNC: 10.7 MG/DL (ref 8.7–10.2)
CHLORIDE SERPL-SCNC: 97 MMOL/L (ref 96–106)
CHOLEST SERPL-MCNC: 199 MG/DL (ref 100–199)
CO2 SERPL-SCNC: 27 MMOL/L (ref 20–29)
CREAT SERPL-MCNC: 1.16 MG/DL (ref 0.76–1.27)
EOSINOPHIL # BLD AUTO: 0 X10E3/UL (ref 0–0.4)
EOSINOPHIL NFR BLD AUTO: 1 %
ERYTHROCYTE [DISTWIDTH] IN BLOOD BY AUTOMATED COUNT: 13.1 % (ref 11.6–15.4)
GLOBULIN SER CALC-MCNC: 2.7 G/DL (ref 1.5–4.5)
GLUCOSE SERPL-MCNC: 105 MG/DL (ref 65–99)
HCT VFR BLD AUTO: 45.4 % (ref 37.5–51)
HDLC SERPL-MCNC: 112 MG/DL
HGB BLD-MCNC: 14.9 G/DL (ref 13–17.7)
IMM GRANULOCYTES # BLD AUTO: 0 X10E3/UL (ref 0–0.1)
IMM GRANULOCYTES NFR BLD AUTO: 0 %
LDLC SERPL CALC-MCNC: 78 MG/DL (ref 0–99)
LYMPHOCYTES # BLD AUTO: 2 X10E3/UL (ref 0.7–3.1)
LYMPHOCYTES NFR BLD AUTO: 43 %
MCH RBC QN AUTO: 25.6 PG (ref 26.6–33)
MCHC RBC AUTO-ENTMCNC: 32.8 G/DL (ref 31.5–35.7)
MCV RBC AUTO: 78 FL (ref 79–97)
MONOCYTES # BLD AUTO: 0.5 X10E3/UL (ref 0.1–0.9)
MONOCYTES NFR BLD AUTO: 10 %
NEUTROPHILS # BLD AUTO: 2.1 X10E3/UL (ref 1.4–7)
NEUTROPHILS NFR BLD AUTO: 45 %
PLATELET # BLD AUTO: 359 X10E3/UL (ref 150–450)
POTASSIUM SERPL-SCNC: 5.1 MMOL/L (ref 3.5–5.2)
PROT SERPL-MCNC: 8 G/DL (ref 6–8.5)
PSA SERPL-MCNC: 2 NG/ML (ref 0–4)
PTH-INTACT SERPL-MCNC: 29 PG/ML (ref 15–65)
RBC # BLD AUTO: 5.82 X10E6/UL (ref 4.14–5.8)
SODIUM SERPL-SCNC: 138 MMOL/L (ref 134–144)
TRIGL SERPL-MCNC: 48 MG/DL (ref 0–149)
VLDLC SERPL CALC-MCNC: 9 MG/DL (ref 5–40)
WBC # BLD AUTO: 4.6 X10E3/UL (ref 3.4–10.8)

## 2021-05-12 ENCOUNTER — OFFICE VISIT (OUTPATIENT)
Dept: INTERNAL MEDICINE CLINIC | Age: 56
End: 2021-05-12
Payer: COMMERCIAL

## 2021-05-12 VITALS
DIASTOLIC BLOOD PRESSURE: 91 MMHG | OXYGEN SATURATION: 99 % | HEART RATE: 114 BPM | TEMPERATURE: 98.1 F | HEIGHT: 67 IN | WEIGHT: 175 LBS | RESPIRATION RATE: 16 BRPM | SYSTOLIC BLOOD PRESSURE: 156 MMHG | BODY MASS INDEX: 27.47 KG/M2

## 2021-05-12 DIAGNOSIS — R00.0 SINUS TACHYCARDIA: ICD-10-CM

## 2021-05-12 DIAGNOSIS — I10 ESSENTIAL HYPERTENSION: Primary | ICD-10-CM

## 2021-05-12 DIAGNOSIS — E78.5 HYPERLIPIDEMIA, UNSPECIFIED HYPERLIPIDEMIA TYPE: ICD-10-CM

## 2021-05-12 PROCEDURE — 99214 OFFICE O/P EST MOD 30 MIN: CPT | Performed by: INTERNAL MEDICINE

## 2021-05-12 RX ORDER — AMLODIPINE BESYLATE 5 MG/1
10 TABLET ORAL DAILY
Qty: 90 TAB | Refills: 3
Start: 2021-05-12 | End: 2021-08-30 | Stop reason: SDUPTHER

## 2021-05-12 NOTE — TELEPHONE ENCOUNTER
Will discuss results at 5/12/21 appt. Normal cholesterol (tot chol 199, LDL 78), improved from 7 mons ago (tot chol 209, LDL 92). Continue atorvastatin, consider increasing dose to 20 mg to get cholesterol levels lower. BS a little high at 105 if fasting. Nl kidney and liver tests, blood counts, and PSA (at 2.0, higher than 7 mons ago at 1.3). Elevated Ca at 10.7 with normal PTH. Consider referral to Endo.

## 2021-05-12 NOTE — PATIENT INSTRUCTIONS
High Blood Pressure: Care Instructions Overview It's normal for blood pressure to go up and down throughout the day. But if it stays up, you have high blood pressure. Another name for high blood pressure is hypertension. Despite what a lot of people think, high blood pressure usually doesn't cause headaches or make you feel dizzy or lightheaded. It usually has no symptoms. But it does increase your risk of stroke, heart attack, and other problems. You and your doctor will talk about your risks of these problems based on your blood pressure. Your doctor will give you a goal for your blood pressure. Your goal will be based on your health and your age. Lifestyle changes, such as eating healthy and being active, are always important to help lower blood pressure. You might also take medicine to reach your blood pressure goal. 
Follow-up care is a key part of your treatment and safety. Be sure to make and go to all appointments, and call your doctor if you are having problems. It's also a good idea to know your test results and keep a list of the medicines you take. How can you care for yourself at home? Medical treatment · If you stop taking your medicine, your blood pressure will go back up. You may take one or more types of medicine to lower your blood pressure. Be safe with medicines. Take your medicine exactly as prescribed. Call your doctor if you think you are having a problem with your medicine. · Talk to your doctor before you start taking aspirin every day. Aspirin can help certain people lower their risk of a heart attack or stroke. But taking aspirin isn't right for everyone, because it can cause serious bleeding. · See your doctor regularly. You may need to see the doctor more often at first or until your blood pressure comes down. · If you are taking blood pressure medicine, talk to your doctor before you take decongestants or anti-inflammatory medicine, such as ibuprofen.  Some of these medicines can raise blood pressure. · Learn how to check your blood pressure at home. Lifestyle changes · Stay at a healthy weight. This is especially important if you put on weight around the waist. Losing even 10 pounds can help you lower your blood pressure. · If your doctor recommends it, get more exercise. Walking is a good choice. Bit by bit, increase the amount you walk every day. Try for at least 30 minutes on most days of the week. You also may want to swim, bike, or do other activities. · Avoid or limit alcohol. Talk to your doctor about whether you can drink any alcohol. · Try to limit how much sodium you eat to less than 2,300 milligrams (mg) a day. Your doctor may ask you to try to eat less than 1,500 mg a day. · Eat plenty of fruits (such as bananas and oranges), vegetables, legumes, whole grains, and low-fat dairy products. · Lower the amount of saturated fat in your diet. Saturated fat is found in animal products such as milk, cheese, and meat. Limiting these foods may help you lose weight and also lower your risk for heart disease. · Do not smoke. Smoking increases your risk for heart attack and stroke. If you need help quitting, talk to your doctor about stop-smoking programs and medicines. These can increase your chances of quitting for good. When should you call for help? Call  911 anytime you think you may need emergency care. This may mean having symptoms that suggest that your blood pressure is causing a serious heart or blood vessel problem. Your blood pressure may be over 180/120. For example, call 911 if: 
  · You have symptoms of a heart attack. These may include: 
? Chest pain or pressure, or a strange feeling in the chest. 
? Sweating. ? Shortness of breath. ? Nausea or vomiting. ? Pain, pressure, or a strange feeling in the back, neck, jaw, or upper belly or in one or both shoulders or arms. ? Lightheadedness or sudden weakness.  
? A fast or irregular heartbeat.  
  · You have symptoms of a stroke. These may include: 
? Sudden numbness, tingling, weakness, or loss of movement in your face, arm, or leg, especially on only one side of your body. ? Sudden vision changes. ? Sudden trouble speaking. ? Sudden confusion or trouble understanding simple statements. ? Sudden problems with walking or balance. ? A sudden, severe headache that is different from past headaches.  
  · You have severe back or belly pain. Do not wait until your blood pressure comes down on its own. Get help right away. Call your doctor now or seek immediate care if: 
  · Your blood pressure is much higher than normal (such as 180/120 or higher), but you don't have symptoms.  
  · You think high blood pressure is causing symptoms, such as: 
? Severe headache. 
? Blurry vision. Watch closely for changes in your health, and be sure to contact your doctor if: 
  · Your blood pressure measures higher than your doctor recommends at least 2 times. That means the top number is higher or the bottom number is higher, or both.  
  · You think you may be having side effects from your blood pressure medicine. Where can you learn more? Go to http://www.gray.com/ Enter Y768 in the search box to learn more about \"High Blood Pressure: Care Instructions. \" Current as of: August 31, 2020               Content Version: 12.8 © 2006-2021 [employer]. Care instructions adapted under license by [employer] (which disclaims liability or warranty for this information). If you have questions about a medical condition or this instruction, always ask your healthcare professional. [place] [address] any warranty or liability for your use of this information.

## 2021-05-12 NOTE — PROGRESS NOTES
Identified pt with two pt identifiers. Reviewed record in preparation for visit and have obtained necessary documentation. All patient medications has been reviewed. Chief Complaint   Patient presents with    Hypertension    Cholesterol Problem     Additional information about chief complaint:    Visit Vitals  BP (!) 156/91 (BP 1 Location: Left upper arm, BP Patient Position: Sitting, BP Cuff Size: Large adult)   Pulse (!) 122   Temp 98.1 °F (36.7 °C) (Oral)   Resp 16   Ht 5' 7\" (1.702 m)   Wt 175 lb (79.4 kg)   SpO2 99%   BMI 27.41 kg/m²       Health Maintenance Due   Topic    Shingrix Vaccine Age 50> (1 of 2)    Colorectal Cancer Screening Combo        1. Have you been to the ER, urgent care clinic since your last visit? Hospitalized since your last visit? NO   2. Have you seen or consulted any other health care providers outside of the [address] [place] [address] since your last visit? Include any pap smears or colon screening.   No  bdg

## 2021-05-12 NOTE — PROGRESS NOTES
CC:   Chief Complaint   Patient presents with    Hypertension    Cholesterol Problem       HISTORY OF PRESENT ILLNESS  Lance Vitale III is a 63 y.o. male. Presents for 6 month follow up evaluation. He has HTN, hyperlipidemia, prediabetes, chronic venous insufficiency, allergic rhinitis, family history of prostate cancer, and family history of colon cancer. No complaints. Denies HA's, CP, SOB, dizziness, or leg swelling. Has infrequent heart palpitations. Home BP monitoring: not done. He reports taking medications as instructed, no medication side effects noted. Diet and Lifestyle: generally follows a low fat low cholesterol diet, generally follows a low sodium diet, exercises regularly. Lab review: labs reviewed and discussed with patient. Colonoscopy: scheduled with Dr. Shannan Cosme in 2 days    ROS  A complete review of systems was performed and is negative except for those mentioned in the HPI. Patient Active Problem List   Diagnosis Code    Family history of colon cancer Z80.0    Family history of prostate cancer Z80.42    Palpitations R00.2    Essential hypertension I10    Hyperlipidemia E78.5    Allergic rhinitis J30.9    Overweight (BMI 25.0-29. 9) E66.3    Right inguinal hernia K40.90    Venous insufficiency (chronic) (peripheral) I87.2     Past Medical History:   Diagnosis Date    Allergic rhinitis     Family history of colon cancer     Family history of prostate cancer     Heart palpitations 2011    Event monitor: occ PVC's. Echo nl. Eval by Dr. Ruma Taylor Hypertension     Low back pain 4/3/2012    Began  Mid 2011 located in the lower back without radiation. Worse in the AM getting out of bed , not constant, better with ibuprofen. Does a lot of lifting, kids and stones last summer.  No sciatica or leg weakness      Lumbar back pain      No Known Allergies    Current Outpatient Medications   Medication Sig Dispense Refill    atorvastatin (LIPITOR) 10 mg tablet Take 1 Tab by mouth daily. 14 Tab 0    amLODIPine (NORVASC) 5 mg tablet TAKE 1 TABLET DAILY FOR    HIGH BLOOD PRESSURE 90 Tab 3    hydroCHLOROthiazide (MICROZIDE) 12.5 mg capsule TAKE 1 CAPSULE ONCE DAILY  FOR HIGH BLOOD PRESSURE. 90 Cap 3    multivitamin (ONE A DAY) tablet Take 1 Tab by mouth daily.  ibuprofen (MOTRIN) 200 mg tablet Take 200 mg by mouth as needed. PHYSICAL EXAM  Visit Vitals  BP (!) 156/91 (BP 1 Location: Left upper arm, BP Patient Position: Sitting, BP Cuff Size: Large adult)   Pulse (!) 114   Temp 98.1 °F (36.7 °C) (Oral)   Resp 16   Ht 5' 7\" (1.702 m)   Wt 175 lb (79.4 kg)   SpO2 99%   BMI 27.41 kg/m²       General: Well-developed and well-nourished, no distress. HEENT:  Head normocephalic/atraumatic, no scleral icterus  Lungs:  Clear to ausculation bilaterally. Good air movement. Heart:  Tachycardic, regular rhythm, normal S1 and S2, no murmur, gallop, or rub  Extremities: No clubbing, cyanosis, or edema. Neurological: Alert and oriented. Psychiatric: Anxious. Behavior is normal.     Labs drawn 5/7/21:  Results for orders placed or performed in visit on 03/17/21   LIPID PANEL   Result Value Ref Range    Cholesterol, total 199 100 - 199 mg/dL    Triglyceride 48 0 - 149 mg/dL    HDL Cholesterol 112 >39 mg/dL    VLDL, calculated 9 5 - 40 mg/dL    LDL, calculated 78 0 - 99 mg/dL   METABOLIC PANEL, COMPREHENSIVE   Result Value Ref Range    Glucose 105 (H) 65 - 99 mg/dL    BUN 16 6 - 24 mg/dL    Creatinine 1.16 0.76 - 1.27 mg/dL    GFR est non-AA 70 >59 mL/min/1.73    GFR est AA 81 >59 mL/min/1.73    BUN/Creatinine ratio 14 9 - 20    Sodium 138 134 - 144 mmol/L    Potassium 5.1 3.5 - 5.2 mmol/L    Chloride 97 96 - 106 mmol/L    CO2 27 20 - 29 mmol/L    Calcium 10.7 (H) 8.7 - 10.2 mg/dL    Protein, total 8.0 6.0 - 8.5 g/dL    Albumin 5.3 (H) 3.8 - 4.9 g/dL    GLOBULIN, TOTAL 2.7 1.5 - 4.5 g/dL    A-G Ratio 2.0 1.2 - 2.2    Bilirubin, total 1.0 0.0 - 1.2 mg/dL    Alk.  phosphatase 54 39 - 117 IU/L    AST (SGOT) 28 0 - 40 IU/L    ALT (SGPT) 19 0 - 44 IU/L   CBC WITH AUTOMATED DIFF   Result Value Ref Range    WBC 4.6 3.4 - 10.8 x10E3/uL    RBC 5.82 (H) 4.14 - 5.80 x10E6/uL    HGB 14.9 13.0 - 17.7 g/dL    HCT 45.4 37.5 - 51.0 %    MCV 78 (L) 79 - 97 fL    MCH 25.6 (L) 26.6 - 33.0 pg    MCHC 32.8 31.5 - 35.7 g/dL    RDW 13.1 11.6 - 15.4 %    PLATELET 165 078 - 658 x10E3/uL    NEUTROPHILS 45 Not Estab. %    Lymphocytes 43 Not Estab. %    MONOCYTES 10 Not Estab. %    EOSINOPHILS 1 Not Estab. %    BASOPHILS 1 Not Estab. %    ABS. NEUTROPHILS 2.1 1.4 - 7.0 x10E3/uL    Abs Lymphocytes 2.0 0.7 - 3.1 x10E3/uL    ABS. MONOCYTES 0.5 0.1 - 0.9 x10E3/uL    ABS. EOSINOPHILS 0.0 0.0 - 0.4 x10E3/uL    ABS. BASOPHILS 0.0 0.0 - 0.2 x10E3/uL    IMMATURE GRANULOCYTES 0 Not Estab. %    ABS. IMM. GRANS. 0.0 0.0 - 0.1 x10E3/uL   PSA SCREENING (SCREENING)   Result Value Ref Range    Prostate Specific Ag 2.0 0.0 - 4.0 ng/mL   PTH INTACT   Result Value Ref Range    PTH, Intact 29 15 - 65 pg/mL         ASSESSMENT AND PLAN    ICD-10-CM ICD-9-CM    1. Essential hypertension  I10 401.9 amLODIPine (NORVASC) 5 mg tablet   2. Hyperlipidemia, unspecified hyperlipidemia type  E78.5 272.4    3. Sinus tachycardia  R00.0 427.89      Discussed lab results with patient. Normal cholesterol (tot chol 199, LDL 78, ), improved from 7 mons ago (tot chol 209, LDL 92).  Continue atorvastatin, consider increasing dose to 20 mg to get cholesterol levels lower.  BS a little high at 105 if fasting.  Nl kidney and liver tests, blood counts, and PSA (at 2.0, higher than 7 mons ago at 1.3).  Elevated Ca at 10.7 with normal PTH. Diagnoses and all orders for this visit:    1. Essential hypertension  Not controlled. Stop HCTZ since it may be causing hypercalcemia. Increase amlodipine to 10 mg daily. If not controlled at next visit, plan to add metoprolol XL 25 mg daily.  -     Increase to: amLODIPine (NORVASC) 5 mg tablet;  Take 2 Tabs by mouth daily.    2. Hyperlipidemia, unspecified hyperlipidemia type  Controlled on atorvastatin 10 mg daily. 3. Sinus tachycardia  Likely due to anxiety. Follow-up and Dispositions    · Return in about 1 month (around 6/12/2021), or if symptoms worsen or fail to improve, for HTN, HR. I have discussed the diagnosis with the patient and the intended plan as seen in the above orders. Patient is in agreement. The patient has received an after-visit summary and questions were answered concerning future plans. I have discussed medication side effects and warnings with the patient as well.

## 2021-06-09 ENCOUNTER — OFFICE VISIT (OUTPATIENT)
Dept: INTERNAL MEDICINE CLINIC | Age: 56
End: 2021-06-09
Payer: COMMERCIAL

## 2021-06-09 VITALS
RESPIRATION RATE: 16 BRPM | BODY MASS INDEX: 27.62 KG/M2 | WEIGHT: 176 LBS | HEIGHT: 67 IN | OXYGEN SATURATION: 98 % | HEART RATE: 92 BPM | DIASTOLIC BLOOD PRESSURE: 72 MMHG | SYSTOLIC BLOOD PRESSURE: 114 MMHG | TEMPERATURE: 98.3 F

## 2021-06-09 DIAGNOSIS — J30.1 SEASONAL ALLERGIC RHINITIS DUE TO POLLEN: ICD-10-CM

## 2021-06-09 DIAGNOSIS — I10 ESSENTIAL HYPERTENSION: Primary | ICD-10-CM

## 2021-06-09 PROCEDURE — 99213 OFFICE O/P EST LOW 20 MIN: CPT | Performed by: INTERNAL MEDICINE

## 2021-06-09 NOTE — PROGRESS NOTES
CC:   Chief Complaint   Patient presents with    Hypertension       HISTORY OF PRESENT ILLNESS  Nahed Hopper III is a 63 y.o. male. Presents for 1 month follow up evaluation of HTN and heart rate. At last clinic visit, /91 and . Amlodipine dose increased from 5 to 10 mg daily; HCTZ stopped due to hypercalcemia. Reports compliance with medications and low-salt diet. Home 's/70's. Denies HA's, CP, SOB, dizziness. Infrequent heart palpitations. Had left leg swelling for one day only. Complains of increased stress at work. Uses an maggie called [employer] to help him relax. Would like to see the allergist his son is seeing about allergic rhinitis with much congestion. Patient Active Problem List   Diagnosis Code    Family history of colon cancer Z80.0    Family history of prostate cancer Z80.42    Palpitations R00.2    Essential hypertension I10    Hyperlipidemia E78.5    Allergic rhinitis J30.9    Overweight (BMI 25.0-29. 9) E66.3    Right inguinal hernia K40.90    Venous insufficiency (chronic) (peripheral) I87.2     Past Medical History:   Diagnosis Date    Allergic rhinitis     Family history of colon cancer     Family history of prostate cancer     Heart palpitations 2011    Event monitor: occ PVC's. Echo nl. Eval by Dr. Lit Fregoso Hypertension     Low back pain 4/3/2012    Began  Mid 2011 located in the lower back without radiation. Worse in the AM getting out of bed , not constant, better with ibuprofen. Does a lot of lifting, kids and stones last summer. No sciatica or leg weakness      Lumbar back pain      No Known Allergies    Current Outpatient Medications   Medication Sig Dispense Refill    amLODIPine (NORVASC) 5 mg tablet Take 2 Tabs by mouth daily. 90 Tab 3    atorvastatin (LIPITOR) 10 mg tablet Take 1 Tab by mouth daily. 14 Tab 0    multivitamin (ONE A DAY) tablet Take 1 Tab by mouth daily.       ibuprofen (MOTRIN) 200 mg tablet Take 200 mg by mouth as needed. PHYSICAL EXAM  Visit Vitals  /72   Pulse 92   Temp 98.3 °F (36.8 °C) (Oral)   Resp 16   Ht 5' 7\" (1.702 m)   Wt 176 lb (79.8 kg)   SpO2 98%   BMI 27.57 kg/m²       General: Well-developed and well-nourished, no distress. HEENT:  Head normocephalic/atraumatic, no scleral icterus  Lungs:  Clear to ausculation bilaterally. Good air movement. Heart:  Regular rate and rhythm, normal S1 and S2, no murmur, gallop, or rub  Extremities: No clubbing, cyanosis, or edema. Neurological: Alert and oriented. Psychiatric: Normal mood and affect. Behavior is normal.         ASSESSMENT AND PLAN    ICD-10-CM ICD-9-CM    1. Essential hypertension  I10 401.9    2. Seasonal allergic rhinitis due to pollen  J30.1 477.0 REFERRAL TO ALLERGY     Diagnoses and all orders for this visit:    1. Essential hypertension  Well-controlled. Continue amlodipine 5 mg 2 tabs daily. HR also improved. 2. Seasonal allergic rhinitis due to pollen  -     REFERRAL TO ALLERGY (Dr. Alphonse Little)       Follow-up and Dispositions    · Return in about 6 months (around 12/9/2021), or if symptoms worsen or fail to improve, for HTN, hyperlipidemia. I have discussed the diagnosis with the patient and the intended plan as seen in the above orders. Patient is in agreement. The patient has received an after-visit summary and questions were answered concerning future plans. I have discussed medication side effects and warnings with the patient as well.

## 2021-06-09 NOTE — PROGRESS NOTES
Identified pt with two pt identifiers. Reviewed record in preparation for visit and have obtained necessary documentation. All patient medications has been reviewed. Chief Complaint   Patient presents with    Hypertension     Additional information about chief complaint:    Visit Vitals  BP (!) 143/78 (BP 1 Location: Left upper arm, BP Patient Position: Sitting, BP Cuff Size: Large adult)   Pulse 92   Temp 98.3 °F (36.8 °C) (Oral)   Resp 16   Ht 5' 7\" (1.702 m)   Wt 176 lb (79.8 kg)   SpO2 98%   BMI 27.57 kg/m²       Health Maintenance Due   Topic    Shingrix Vaccine Age 50> (1 of 2)    Colorectal Cancer Screening Combo        1. Have you been to the ER, urgent care clinic since your last visit? Hospitalized since your last visit? NO   2. Have you seen or consulted any other health care providers outside of the [address] [place] [address] since your last visit? Include any pap smears or colon screening.   NO   bdg

## 2021-06-09 NOTE — PATIENT INSTRUCTIONS
Learning About High Blood Pressure What is high blood pressure? Blood pressure is a measure of how hard the blood pushes against the walls of your arteries. It's normal for blood pressure to go up and down throughout the day. But if it stays up, you have high blood pressure. Another name for high blood pressure is hypertension. Two numbers tell you your blood pressure. The first number is the systolic pressure (top number). It shows how hard the blood pushes when your heart is pumping. The second number is the diastolic pressure (bottom number). It shows how hard the blood pushes between heartbeats, when your heart is relaxed and filling with blood. Your doctor will give you a goal for your blood pressure based on your health and your age. High blood pressure (hypertension) means that the top number stays high, or the bottom number stays high, or both. High blood pressure increases the risk of stroke, heart attack, and other problems. What happens when you have high blood pressure? · Blood flows through your arteries with too much force. Over time, this can damage the heart and the walls of your arteries. But you can't feel it. High blood pressure usually doesn't cause symptoms. · High blood pressure makes your heart work harder. And that can lead to heart failure, which means your heart doesn't pump as much blood as your body needs. · Fat and calcium start to build up in your arteries. This buildup is called hardening of the arteries. It can cause many problems including a heart attack and stroke. · Arteries also carry blood and oxygen to organs like your eyes, kidneys, and brain. If high blood pressure damages those arteries, it can lead to vision loss, kidney disease, stroke, and a higher risk of dementia. How can you prevent high blood pressure? · Stay at a healthy weight. · Try to limit how much sodium you eat to less than 2,300 milligrams (mg) a day.  If you limit your sodium to 1,500 mg a day, you can lower your blood pressure even more. ? Buy foods that are labeled \"unsalted,\" \"sodium-free,\" or \"low-sodium. \" Foods labeled \"reduced-sodium\" and \"light sodium\" may still have too much sodium. ? Flavor your food with garlic, lemon juice, onion, vinegar, herbs, and spices instead of salt. Do not use soy sauce, steak sauce, onion salt, garlic salt, mustard, or ketchup on your food. ? Use less salt (or none) when recipes call for it. You can often use half the salt a recipe calls for without losing flavor. · Be physically active. Get at least 30 minutes of exercise on most days of the week. Walking is a good choice. You also may want to do other activities, such as running, swimming, cycling, or playing tennis or team sports. · Limit alcohol to 2 drinks a day for men and 1 drink a day for women. · Eat plenty of fruits, vegetables, and low-fat dairy products. Eat less saturated and total fats. How is high blood pressure treated? · Your doctor will suggest making lifestyle changes to help your heart. For example, your doctor may ask you to eat healthy foods, quit smoking, lose extra weight, and be more active. · If lifestyle changes don't help enough, your doctor may recommend that you take medicine. · When blood pressure is very high, medicines are needed to lower it. Follow-up care is a key part of your treatment and safety. Be sure to make and go to all appointments, and call your doctor if you are having problems. It's also a good idea to know your test results and keep a list of the medicines you take. Where can you learn more? Go to http://www.[employer].com/ Enter P501 in the search box to learn more about \"Learning About High Blood Pressure. \" Current as of: August 31, 2020               Content Version: 12.8 © 0745-2969 [employer].   
Care instructions adapted under license by [employer] (which disclaims liability or warranty for this information). If you have questions about a medical condition or this instruction, always ask your healthcare professional. [place] [address] any warranty or liability for your use of this information.

## 2021-07-22 NOTE — PROGRESS NOTES
[address] [facility], [address] [place] [address] Ne, [address]  [phone]     Subjective:      Nico Trevizo III is a 61 y.o. male is here for a f/u appt. Last seen by Dr Kassandra Hanley 2/11/2019. He reports he had episode of left sided CP which required visit to [facility] freestanding ED 7/13/21. He reports the pain was in his left chest and went through to his back. Acute work up was neg including labs, ekg, and CXR. Recommended he follow up outpt. He denies shortness of breath, orthopnea, PND, palpitations, syncope, or presyncope. Patient Active Problem List    Diagnosis Date Noted    Venous insufficiency (chronic) (peripheral) 09/24/2019    Right inguinal hernia 01/14/2018    Overweight (BMI 25.0-29.9) 09/19/2017    Hyperlipidemia 06/12/2014    Allergic rhinitis 06/12/2014    Essential hypertension     Palpitations 06/20/2011    Family history of colon cancer 06/14/2010    Family history of prostate cancer 06/14/2010      Leona Sanders MD  Past Medical History:   Diagnosis Date    Allergic rhinitis     Atrial fibrillation ([address]     Family history of colon cancer     Family history of prostate cancer     Heart palpitations 2011    Event monitor: occ PVC's. Echo nl. Eval by Dr. Jeff Ji Hyperlipidemia     Hypertension     Low back pain 4/3/2012    Began  Mid 2011 located in the lower back without radiation. Worse in the AM getting out of bed , not constant, better with ibuprofen. Does a lot of lifting, kids and stones last summer. No sciatica or leg weakness      Lumbar back pain     Murmur       Past Surgical History:   Procedure Laterality Date    HX COLONOSCOPY  2008    Dr Viviana Staley HX COLONOSCOPY  6/12/15    Dr. Carie Wu; diverticulosis, int. hemorrhoids    HX COLONOSCOPY  05/14/2021    Dr. Nelida Steward. Diverticulosis in sigmoid colon. Gr 1 int hemorrhoids.  Repeat in 5 yrs     No Known Allergies   Family History   Problem Relation Age of Onset    Cancer Mother         rectal ca   [employer] Diabetes Mother     Other Mother         thyroid issues/unknown type    Cancer Sister       Social History     Socioeconomic History    Marital status:      Spouse name: Not on file    Number of children: Not on file    Years of education: Not on file    Highest education level: Not on file   Occupational History    Not on file   Tobacco Use    Smoking status: Former Smoker     Packs/day: 0.50     Years: 2.00     Pack years: 1.00     Types: Cigarettes     Quit date: 1/3/1997     Years since quittin.5    Smokeless tobacco: Former User     Types: [address] date: 1/3/1996   Vaping Use    Vaping Use: Never used   Substance and Sexual Activity    Alcohol use: Yes     Alcohol/week: 5.8 standard drinks     Types: 7 Standard drinks or equivalent per week     Comment: weekly    Drug use: No    Sexual activity: Yes   Other Topics Concern    Not on file   Social History Narrative    Not on file     Social Determinants of Health     Financial Resource Strain:     Difficulty of Paying Living Expenses:    Food Insecurity:     Worried About Running Out of Food in the Last Year:     [address] in the Last Year:    Transportation Needs:     Lack of Transportation (Medical):  Lack of Transportation (Non-Medical):    Physical Activity:     Days of Exercise per Week:     Minutes of Exercise per Session:    Stress:     Feeling of Stress :    Social Connections:     Frequency of Communication with Friends and Family:     Frequency of Social Gatherings with Friends and Family:     Attends [employer] Services:     Active Member of Clubs or Organizations:     Attends Club or Organization Meetings:     Marital Status:    Intimate Partner Violence:     Fear of Current or Ex-Partner:     Emotionally Abused:     Physically Abused:     Sexually Abused:       Current Outpatient Medications   Medication Sig    amLODIPine (NORVASC) 5 mg tablet Take 2 Tabs by mouth daily.     atorvastatin (LIPITOR) 10 mg tablet Take 1 Tab by mouth daily.  multivitamin (ONE A DAY) tablet Take 1 Tab by mouth daily.  ibuprofen (MOTRIN) 200 mg tablet Take 200 mg by mouth as needed. No current facility-administered medications for this visit. Review of Symptoms:  11 systems reviewed, negative other than as stated in the HPI    Physical ExamPhysical Exam:    Vitals:    07/23/21 1348   BP: (!) 150/90   Pulse: 100   Resp: 16   SpO2: 98%   Weight: 175 lb 11.2 oz (79.7 kg)   Height: 5' 7\" (1.702 m)     Body mass index is 27.52 kg/m². General PE   Gen:  NAD  Mental Status - Alert. General Appearance - Not in acute distress. Chest and Lung Exam   Inspection: Accessory muscles - No use of accessory muscles in breathing. Auscultation:   Breath sounds: - Normal.   Cardiovascular   Inspection: Jugular vein - Bilateral - Inspection Normal.   Palpation/Percussion:   Apical Impulse: - Normal.   Auscultation: Rhythm - Regular. Heart Sounds - S1 WNL and S2 WNL. No S3 or S4. Murmurs & Other Heart Sounds: Auscultation of the heart reveals - No Murmurs. Peripheral Vascular   Upper Extremity: Inspection - Bilateral - No Cyanotic nailbeds or Digital clubbing. Lower Extremity:   Palpation: Edema - Bilateral - No edema. Abdomen:   Soft, non-tender, bowel sounds are active.   Neuro: A&O times 3, CN and motor grossly WNL    Labs:   Lab Results   Component Value Date/Time    Cholesterol, total 199 05/07/2021 10:05 AM    Cholesterol, total 209 (H) 09/16/2020 09:25 AM    Cholesterol, total 195 03/12/2019 08:30 AM    Cholesterol, total 216 (H) 03/20/2018 09:00 AM    Cholesterol, total 205 (H) 09/19/2017 09:22 AM    HDL Cholesterol 112 05/07/2021 10:05 AM    HDL Cholesterol 109 09/16/2020 09:25 AM    HDL Cholesterol 103 03/12/2019 08:30 AM    HDL Cholesterol 123 03/20/2018 09:00 AM    HDL Cholesterol 133 09/19/2017 09:22 AM    LDL, calculated 78 05/07/2021 10:05 AM    LDL, calculated 92 09/16/2020 09:25 AM    LDL, calculated 84 03/12/2019 08:30 AM    LDL, calculated 84 03/20/2018 09:00 AM    LDL, calculated 64 09/19/2017 09:22 AM    LDL, calculated 64 05/16/2017 10:22 AM    LDL, calculated 110 (H) 01/18/2017 09:54 AM    Triglyceride 48 05/07/2021 10:05 AM    Triglyceride 40 09/16/2020 09:25 AM    Triglyceride 40 03/12/2019 08:30 AM    Triglyceride 47 03/20/2018 09:00 AM    Triglyceride 42 09/19/2017 09:22 AM     No results found for: CPK, CPKX, CPX  Lab Results   Component Value Date/Time    Sodium 138 05/07/2021 10:05 AM    Potassium 5.1 05/07/2021 10:05 AM    Chloride 97 05/07/2021 10:05 AM    CO2 27 05/07/2021 10:05 AM    Anion gap 10 09/07/2016 03:45 PM    Glucose 105 (H) 05/07/2021 10:05 AM    BUN 16 05/07/2021 10:05 AM    Creatinine 1.16 05/07/2021 10:05 AM    BUN/Creatinine ratio 14 05/07/2021 10:05 AM    GFR est AA 81 05/07/2021 10:05 AM    GFR est non-AA 70 05/07/2021 10:05 AM    Calcium 10.7 (H) 05/07/2021 10:05 AM    Bilirubin, total 1.0 05/07/2021 10:05 AM    Alk. phosphatase 54 05/07/2021 10:05 AM    Protein, total 8.0 05/07/2021 10:05 AM    Albumin 5.3 (H) 05/07/2021 10:05 AM    Globulin 3.4 09/07/2016 03:45 PM    A-G Ratio 2.0 05/07/2021 10:05 AM    ALT (SGPT) 19 05/07/2021 10:05 AM       EKG: ST, , nonspecific T wave abnormality      Assessment:        1. Precordial pain    2. Essential hypertension    3. Mixed hyperlipidemia    4. Venous insufficiency    5. Palpitations    6. Leg swelling        Orders Placed This Encounter    AMB POC EKG ROUTINE W/ 12 LEADS, INTER & REP     Order Specific Question:   Reason for Exam:     Answer:   routine        Plan:   Chest pain  Episode of left sided CP radiating to his back 7/13/21. He denies recurrence or other symptoms recently. He has  with nonspecific T wave changes on his EKG. He reports he is anxious which is an issue for him, not uncommon for BP/HR to go up. Echo 2016 Normal EF without significant valvular disease.  Normal stress ekg in 10/16    Hx Palpitations   Denies recent palpitations. HR up today, reports he feels anxious d/t his recent CP. Holter 10/2016 showed SR with symptom correlation to ST. Previously on a BB but stopped medication as his palpitations had resolved. HTN  Elevated in office. He had change in his medications recently, now on Amlodipine. Reviewed labs ordered by PCP on 5/7/21 creatinine 1.16. He is also having anxiety today. Will monitor during stress echo.      Hyperlipidemia  Reviewed labs ordered by PCP from 5/7/21 LDL 78. On Lipitor 10mg  Labs and lipids are followed by PCP     Leg swelling/venous insufficiency  Being followed by Dr Frost Him. Stable.      f/u with Dr Leobardo Walker dependent on results.      Sloane Lew NP

## 2021-07-23 ENCOUNTER — OFFICE VISIT (OUTPATIENT)
Dept: CARDIOLOGY CLINIC | Age: 56
End: 2021-07-23
Payer: COMMERCIAL

## 2021-07-23 VITALS
OXYGEN SATURATION: 98 % | HEART RATE: 100 BPM | SYSTOLIC BLOOD PRESSURE: 150 MMHG | BODY MASS INDEX: 27.58 KG/M2 | RESPIRATION RATE: 16 BRPM | DIASTOLIC BLOOD PRESSURE: 90 MMHG | WEIGHT: 175.7 LBS | HEIGHT: 67 IN

## 2021-07-23 DIAGNOSIS — I10 ESSENTIAL HYPERTENSION: ICD-10-CM

## 2021-07-23 DIAGNOSIS — E78.2 MIXED HYPERLIPIDEMIA: ICD-10-CM

## 2021-07-23 DIAGNOSIS — M79.89 LEG SWELLING: ICD-10-CM

## 2021-07-23 DIAGNOSIS — R00.2 PALPITATIONS: ICD-10-CM

## 2021-07-23 DIAGNOSIS — R07.2 PRECORDIAL PAIN: Primary | ICD-10-CM

## 2021-07-23 DIAGNOSIS — I87.2 VENOUS INSUFFICIENCY: ICD-10-CM

## 2021-07-23 PROCEDURE — 99204 OFFICE O/P NEW MOD 45 MIN: CPT | Performed by: NURSE PRACTITIONER

## 2021-07-23 PROCEDURE — 93000 ELECTROCARDIOGRAM COMPLETE: CPT | Performed by: NURSE PRACTITIONER

## 2021-07-23 NOTE — LETTER
7/23/2021    Patient: Georgi Fonseca III   YOB: 1965   Date of Visit: 7/23/2021     Jed Castellano MD  [address] [address]  [place]    Dear Jed Castellano MD,      Thank you for referring Mr. Georgi Fonseca to [facility] for evaluation. My notes for this consultation are attached. If you have questions, please do not hesitate to call me. I look forward to following your patient along with you.       Sincerely,    Laurel Bedolla NP

## 2021-07-23 NOTE — PROGRESS NOTES
Chief Complaint   Patient presents with   [facility] Follow Up     Chest pain    Hypertension     1. Have you been to the ER, urgent care clinic since your last visit? Yes 7/14/21  [facility] Hospitalized since your last visit? No    2. Have you seen or consulted any other health care providers outside of the [facility] since your last visit? Yes  Include any pap smears or colon screening.   No      Patient C/O chest pain with radiation to back had recently started back lift weights thinks may have been muscular

## 2021-08-17 ENCOUNTER — ANCILLARY PROCEDURE (OUTPATIENT)
Dept: CARDIOLOGY CLINIC | Age: 56
End: 2021-08-17
Payer: COMMERCIAL

## 2021-08-17 VITALS
HEIGHT: 67 IN | BODY MASS INDEX: 27.47 KG/M2 | WEIGHT: 175 LBS | SYSTOLIC BLOOD PRESSURE: 150 MMHG | DIASTOLIC BLOOD PRESSURE: 90 MMHG

## 2021-08-17 DIAGNOSIS — R00.2 PALPITATIONS: ICD-10-CM

## 2021-08-17 DIAGNOSIS — I87.2 VENOUS INSUFFICIENCY: ICD-10-CM

## 2021-08-17 DIAGNOSIS — I10 ESSENTIAL HYPERTENSION: ICD-10-CM

## 2021-08-17 DIAGNOSIS — M79.89 LEG SWELLING: ICD-10-CM

## 2021-08-17 DIAGNOSIS — R07.2 PRECORDIAL PAIN: ICD-10-CM

## 2021-08-17 DIAGNOSIS — E78.2 MIXED HYPERLIPIDEMIA: ICD-10-CM

## 2021-08-17 LAB
STRESS ANGINA INDEX: 0
STRESS BASELINE DIAS BP: 88 MMHG
STRESS BASELINE HR: 102 BPM
STRESS BASELINE SYS BP: 145 MMHG
STRESS ESTIMATED WORKLOAD: 12 METS
STRESS EXERCISE DUR MIN: NORMAL MIN:SEC
STRESS O2 SAT PEAK: 95 %
STRESS O2 SAT REST: 100 %
STRESS PEAK DIAS BP: 90 MMHG
STRESS PEAK SYS BP: 190 MMHG
STRESS PERCENT HR ACHIEVED: 105 %
STRESS POST PEAK HR: 171 BPM
STRESS RATE PRESSURE PRODUCT: NORMAL BPM*MMHG
STRESS SR DUKE TREADMILL SCORE: 10
STRESS ST DEPRESSION: 0 MM
STRESS ST ELEVATION: 0 MM
STRESS STAGE 1 BP: NORMAL MMHG
STRESS STAGE 1 DURATION: NORMAL MIN:SEC
STRESS STAGE 1 HR: 123 BPM
STRESS STAGE 2 BP: NORMAL MMHG
STRESS STAGE 2 DURATION: NORMAL MIN:SEC
STRESS STAGE 2 HR: 150 BPM
STRESS STAGE 3 BP: NORMAL MMHG
STRESS STAGE 3 DURATION: NORMAL MIN:SEC
STRESS STAGE 3 HR: 162 BPM
STRESS STAGE 4 DURATION: NORMAL MIN:SEC
STRESS STAGE 4 HR: 169 BPM
STRESS STAGE RECOVERY 1 BP: NORMAL MMHG
STRESS STAGE RECOVERY 1 DURATION: NORMAL MIN:SEC
STRESS STAGE RECOVERY 1 HR: 112 BPM
STRESS TARGET HR: 163 BPM

## 2021-08-17 PROCEDURE — 93351 STRESS TTE COMPLETE: CPT | Performed by: INTERNAL MEDICINE

## 2021-08-18 ENCOUNTER — TELEPHONE (OUTPATIENT)
Dept: CARDIOLOGY CLINIC | Age: 56
End: 2021-08-18

## 2021-08-18 NOTE — PROGRESS NOTES
Stress test is negative for indications of blood flow abnormalities to the heart. Follow up with Dr Leobardo Walker sooner if still concerned about symptoms. 6 months if better.

## 2021-08-18 NOTE — TELEPHONE ENCOUNTER
----- Message from Ayse Dickson NP sent at 8/18/2021  8:18 AM EDT -----  Stress test is negative for indications of blood flow abnormalities to the heart. Follow up with Dr Angeles Patrick sooner if still concerned about symptoms. 6 months if better.

## 2021-08-18 NOTE — TELEPHONE ENCOUNTER
Spoke with patient. Verified with two patient identifiers. Advised pt per Sameera Peterson NP, Stress test is negative for indications of blood flow abnormalities to the heart. Follow up with Dr Hailey Todd sooner if still concerned about symptoms. 6 months if better. Pt states that he will play it by ear and call us to schedule an appt if needed. Patient verbalized understanding.

## 2021-08-26 ENCOUNTER — PATIENT MESSAGE (OUTPATIENT)
Dept: INTERNAL MEDICINE CLINIC | Age: 56
End: 2021-08-26

## 2021-08-26 DIAGNOSIS — Z80.42 FH: PROSTATE CANCER: Primary | ICD-10-CM

## 2021-08-27 NOTE — TELEPHONE ENCOUNTER
From: Rivera Belt III  To: Diane Montenegro MD  Sent: 8/26/2021 1:05 PM EDT  Subject: Referral Request    I'm hoping to get a referral to [facility]. Because of my family history, I would like to stay ahead of prostate issues. Thanks.  Tiffany Talley

## 2021-08-30 DIAGNOSIS — I10 ESSENTIAL HYPERTENSION: ICD-10-CM

## 2021-08-30 RX ORDER — AMLODIPINE BESYLATE 10 MG/1
10 TABLET ORAL DAILY
Qty: 90 TABLET | Refills: 3 | Status: SHIPPED | OUTPATIENT
Start: 2021-08-30 | End: 2022-06-27

## 2021-08-30 RX ORDER — AMLODIPINE BESYLATE 5 MG/1
10 TABLET ORAL DAILY
Qty: 90 TABLET | Refills: 3 | Status: CANCELLED | OUTPATIENT
Start: 2021-08-30

## 2021-08-30 RX ORDER — AMLODIPINE BESYLATE 10 MG/1
10 TABLET ORAL DAILY
Qty: 14 TABLET | Refills: 1 | Status: SHIPPED | OUTPATIENT
Start: 2021-08-30 | End: 2021-09-13

## 2021-08-30 NOTE — TELEPHONE ENCOUNTER
PCP: Agus Brown MD     Last appt: 6/9/2021     Future Appointments   Date Time Provider Josh Paz   12/9/2021  8:30 AM Agus Brown MD [facility]          Requested Prescriptions     Pending Prescriptions Disp Refills    amLODIPine (NORVASC) 10 mg tablet 14 Tablet 1     Sig: Take 1 Tablet by mouth daily for 14 days.  amLODIPine (NORVASC) 10 mg tablet 90 Tablet 3     Sig: Take 1 Tablet by mouth daily.

## 2021-08-30 NOTE — TELEPHONE ENCOUNTER
Pt called to request a short term rx for   Requested Prescriptions     Pending Prescriptions Disp Refills    amLODIPine (NORVASC) 5 mg tablet 90 Tablet 3     Sig: Take 2 Tablets by mouth daily. Pt has rx on the way from the mail order pharmacy, but is out already and needs a few days to last until delivery. Pt is currently taking 10mg per day. Pt requested a call at [phone] when rx has been sent to pharmacy.

## 2021-12-09 ENCOUNTER — VIRTUAL VISIT (OUTPATIENT)
Dept: INTERNAL MEDICINE CLINIC | Age: 56
End: 2021-12-09
Payer: COMMERCIAL

## 2021-12-09 DIAGNOSIS — M75.82 TENDINITIS OF LEFT ROTATOR CUFF: ICD-10-CM

## 2021-12-09 DIAGNOSIS — M79.671 FOOT ARCH PAIN, RIGHT: ICD-10-CM

## 2021-12-09 DIAGNOSIS — I10 ESSENTIAL HYPERTENSION: Primary | ICD-10-CM

## 2021-12-09 DIAGNOSIS — J30.1 SEASONAL ALLERGIC RHINITIS DUE TO POLLEN: ICD-10-CM

## 2021-12-09 PROCEDURE — 99214 OFFICE O/P EST MOD 30 MIN: CPT | Performed by: INTERNAL MEDICINE

## 2021-12-09 RX ORDER — CETIRIZINE HCL 10 MG
10 TABLET ORAL
COMMUNITY

## 2021-12-09 RX ORDER — IPRATROPIUM BROMIDE 42 UG/1
1 SPRAY, METERED NASAL AS NEEDED
COMMUNITY
Start: 2021-09-07

## 2021-12-09 NOTE — PROGRESS NOTES
Bentley Pineda III (: 1965) is a 61 y.o. male, established patient, here for evaluation of the following chief complaint(s):   Follow-up (6 month)         On this date 2021 I have spent 6 minutes reviewing previous notes, test results and face to face (virtual) with the patient discussing the diagnosis and importance of compliance with the treatment plan as well as documenting on the day of the visit. Bentley Pineda III, was evaluated through a synchronous (real-time) audio-video encounter. The patient (or guardian if applicable) is aware that this is a billable service. Verbal consent to proceed has been obtained within the past 12 months. The visit was conducted pursuant to the emergency declaration under the [address] [place] [address] authority and the [email or web address] and [employer] General Act. Patient identification was verified, and a caregiver was present when appropriate. The patient was located in a state where the provider was credentialed to provide care. An electronic signature was used to authenticate this note.   -- Estee Pires

## 2022-02-22 DIAGNOSIS — E78.2 MIXED HYPERLIPIDEMIA: ICD-10-CM

## 2022-02-22 RX ORDER — ATORVASTATIN CALCIUM 10 MG/1
TABLET, FILM COATED ORAL
Qty: 90 TABLET | Refills: 3 | Status: SHIPPED | OUTPATIENT
Start: 2022-02-22

## 2022-03-18 PROBLEM — E66.3 OVERWEIGHT (BMI 25.0-29.9): Status: ACTIVE | Noted: 2017-09-19

## 2022-03-19 PROBLEM — K40.90 RIGHT INGUINAL HERNIA: Status: ACTIVE | Noted: 2018-01-14

## 2022-03-20 PROBLEM — I87.2 VENOUS INSUFFICIENCY (CHRONIC) (PERIPHERAL): Status: ACTIVE | Noted: 2019-09-24

## 2022-09-09 ENCOUNTER — TELEPHONE (OUTPATIENT)
Dept: INTERNAL MEDICINE CLINIC | Age: 57
End: 2022-09-09

## 2022-09-09 DIAGNOSIS — Z12.5 SCREENING FOR PROSTATE CANCER: ICD-10-CM

## 2022-09-09 DIAGNOSIS — I10 ESSENTIAL HYPERTENSION: Primary | ICD-10-CM

## 2022-09-09 DIAGNOSIS — E83.52 HYPERCALCEMIA: ICD-10-CM

## 2022-09-09 DIAGNOSIS — E78.2 MIXED HYPERLIPIDEMIA: ICD-10-CM

## 2022-09-09 NOTE — TELEPHONE ENCOUNTER
Called to pt. Verified pt's name and date of birth. Notified pt that Dr. Rashawn Rivas would like him to have the Labs done before his appointment. Questioned pt is he would like the lab order mailed or could he pick it up. Pt states he will pick the order up from the office.

## 2022-09-09 NOTE — TELEPHONE ENCOUNTER
----- Message from Irene Landau sent at 9/9/2022 12:23 PM EDT -----  Subject: Message to Provider    QUESTIONS  Information for Provider? Pt would like to know if he needs blood work   prior to physical on 09/13. Please advise pt. No active orders. ---------------------------------------------------------------------------  --------------  Mei Emery [facility]  [phone]; OK to leave message on voicemail  ---------------------------------------------------------------------------  --------------  SCRIPT ANSWERS  Relationship to Patient?  Self

## 2022-09-09 NOTE — TELEPHONE ENCOUNTER
Dr. Erlinda Davis,   Would you like this pt to have labs done before his appointment on 09/26/2022? Pts mother calling, pt has scrotal rash (fh of psoriasis and eczema), there are no available apts for np and I have put pt on waitlist for possible cancellation and informed her to cb for any cancellations  (mother stating that Wednesday afternoons are best for her schedule with work, [email or web address]'s employee)  I would like to know what you advise on how to handle, if I can possibly double book your schedule on a Wednesday afternoon for this pt  Thank you for reviewing and pls advise

## 2022-09-22 LAB
ALBUMIN SERPL-MCNC: 5.2 G/DL (ref 3.8–4.9)
ALBUMIN/GLOB SERPL: 2.1 {RATIO} (ref 1.2–2.2)
ALP SERPL-CCNC: 70 IU/L (ref 44–121)
ALT SERPL-CCNC: 22 IU/L (ref 0–44)
AST SERPL-CCNC: 31 IU/L (ref 0–40)
BASOPHILS # BLD AUTO: 0 X10E3/UL (ref 0–0.2)
BASOPHILS NFR BLD AUTO: 1 %
BILIRUB SERPL-MCNC: 0.7 MG/DL (ref 0–1.2)
BUN SERPL-MCNC: 15 MG/DL (ref 6–24)
BUN/CREAT SERPL: 15 (ref 9–20)
CALCIUM SERPL-MCNC: 10.8 MG/DL (ref 8.7–10.2)
CHLORIDE SERPL-SCNC: 99 MMOL/L (ref 96–106)
CHOLEST SERPL-MCNC: 217 MG/DL (ref 100–199)
CO2 SERPL-SCNC: 25 MMOL/L (ref 20–29)
CREAT SERPL-MCNC: 1.01 MG/DL (ref 0.76–1.27)
EGFR: 86 ML/MIN/1.73
EOSINOPHIL # BLD AUTO: 0 X10E3/UL (ref 0–0.4)
EOSINOPHIL NFR BLD AUTO: 1 %
ERYTHROCYTE [DISTWIDTH] IN BLOOD BY AUTOMATED COUNT: 13.3 % (ref 11.6–15.4)
GLOBULIN SER CALC-MCNC: 2.5 G/DL (ref 1.5–4.5)
GLUCOSE SERPL-MCNC: 93 MG/DL (ref 65–99)
HCT VFR BLD AUTO: 46.1 % (ref 37.5–51)
HDLC SERPL-MCNC: 121 MG/DL
HGB BLD-MCNC: 15.2 G/DL (ref 13–17.7)
IMM GRANULOCYTES # BLD AUTO: 0 X10E3/UL (ref 0–0.1)
IMM GRANULOCYTES NFR BLD AUTO: 0 %
LDLC SERPL CALC-MCNC: 89 MG/DL (ref 0–99)
LYMPHOCYTES # BLD AUTO: 1.9 X10E3/UL (ref 0.7–3.1)
LYMPHOCYTES NFR BLD AUTO: 36 %
MCH RBC QN AUTO: 25.9 PG (ref 26.6–33)
MCHC RBC AUTO-ENTMCNC: 33 G/DL (ref 31.5–35.7)
MCV RBC AUTO: 79 FL (ref 79–97)
MONOCYTES # BLD AUTO: 0.4 X10E3/UL (ref 0.1–0.9)
MONOCYTES NFR BLD AUTO: 8 %
NEUTROPHILS # BLD AUTO: 2.9 X10E3/UL (ref 1.4–7)
NEUTROPHILS NFR BLD AUTO: 54 %
PLATELET # BLD AUTO: 321 X10E3/UL (ref 150–450)
POTASSIUM SERPL-SCNC: 5.4 MMOL/L (ref 3.5–5.2)
PROT SERPL-MCNC: 7.7 G/DL (ref 6–8.5)
PSA SERPL-MCNC: 1.8 NG/ML (ref 0–4)
PTH-INTACT SERPL-MCNC: 38 PG/ML (ref 15–65)
RBC # BLD AUTO: 5.86 X10E6/UL (ref 4.14–5.8)
SODIUM SERPL-SCNC: 139 MMOL/L (ref 134–144)
TRIGL SERPL-MCNC: 39 MG/DL (ref 0–149)
VLDLC SERPL CALC-MCNC: 7 MG/DL (ref 5–40)
WBC # BLD AUTO: 5.2 X10E3/UL (ref 3.4–10.8)

## 2022-09-25 NOTE — PROGRESS NOTES
Will discuss at 9/26/22 appt. K+ a little high. On no medications that should increase K+. Ca high despite no longer taking HCTZ (stopped in May 2021); normal PTH. Will order additional tests to evaluate (PTHrP, 1,25-diOH vit D, 25-OH D, SPEP, serum free light chain assay, TSH). Rest of labs normal, including kidney and liver tests, blood counts, cholesterol (tot chol high due to high HDL), and PSA.

## 2022-09-26 ENCOUNTER — OFFICE VISIT (OUTPATIENT)
Dept: INTERNAL MEDICINE CLINIC | Age: 57
End: 2022-09-26
Payer: COMMERCIAL

## 2022-09-26 VITALS
WEIGHT: 182.4 LBS | OXYGEN SATURATION: 98 % | SYSTOLIC BLOOD PRESSURE: 142 MMHG | HEART RATE: 92 BPM | BODY MASS INDEX: 28.63 KG/M2 | HEIGHT: 67 IN | DIASTOLIC BLOOD PRESSURE: 91 MMHG | RESPIRATION RATE: 18 BRPM

## 2022-09-26 DIAGNOSIS — M25.562 ACUTE PAIN OF LEFT KNEE: ICD-10-CM

## 2022-09-26 DIAGNOSIS — E87.5 HYPERKALEMIA: ICD-10-CM

## 2022-09-26 DIAGNOSIS — I10 ESSENTIAL HYPERTENSION: Primary | ICD-10-CM

## 2022-09-26 DIAGNOSIS — E83.52 HYPERCALCEMIA: ICD-10-CM

## 2022-09-26 DIAGNOSIS — E78.2 MIXED HYPERLIPIDEMIA: ICD-10-CM

## 2022-09-26 DIAGNOSIS — I83.93 ASYMPTOMATIC VARICOSE VEINS OF BOTH LOWER EXTREMITIES: ICD-10-CM

## 2022-09-26 PROCEDURE — 99214 OFFICE O/P EST MOD 30 MIN: CPT | Performed by: INTERNAL MEDICINE

## 2022-09-26 RX ORDER — IBUPROFEN 600 MG/1
600 TABLET ORAL
Qty: 15 TABLET | Refills: 0 | Status: SHIPPED | OUTPATIENT
Start: 2022-09-26 | End: 2022-10-01

## 2022-09-26 RX ORDER — NEBIVOLOL 5 MG/1
5 TABLET ORAL DAILY
Qty: 30 TABLET | Refills: 2 | Status: SHIPPED | OUTPATIENT
Start: 2022-09-26 | End: 2022-10-04 | Stop reason: ALTCHOICE

## 2022-09-26 NOTE — PROGRESS NOTES
Alda Tolliver III  Identified pt with two pt identifiers(name and ). Chief Complaint   Patient presents with    Knee Pain    Physical       Reviewed record In preparation for visit and have obtained necessary documentation. 1. Have you been to the ER, urgent care clinic or hospitalized since your last visit? No     2. Have you seen or consulted any other health care providers outside of the [address] [place] [address] since your last visit? Include any pap smears or colon screening. No    Vitals reviewed with provider.     Health Maintenance reviewed:     Health Maintenance Due   Topic    Shingrix Vaccine Age 49> (1 of 2)    COVID-19 Vaccine (4 - Booster for Moderna series)    Depression Screen     Flu Vaccine (1)          Wt Readings from Last 3 Encounters:   22 182 lb 6.4 oz (82.7 kg)   21 175 lb (79.4 kg)   21 175 lb 11.2 oz (79.7 kg)        Temp Readings from Last 3 Encounters:   21 98.3 °F (36.8 °C) (Oral)   21 98.1 °F (36.7 °C) (Oral)   20 98.7 °F (37.1 °C)        BP Readings from Last 3 Encounters:   22 (!) 142/91   21 (!) 150/90   21 (!) 150/90        Pulse Readings from Last 3 Encounters:   22 92   21 100   21 92        Vitals:    22 0851 22 0855   BP: (!) 150/88 (!) 142/91   Pulse: 92    Resp: 18    SpO2: 98%    Weight: 182 lb 6.4 oz (82.7 kg)    Height: 5' 7\" (1.702 m)           Learning Assessment:   :       Learning Assessment 2014   PRIMARY LEARNER Patient   HIGHEST LEVEL OF EDUCATION - PRIMARY LEARNER  4 YEARS OF COLLEGE   BARRIERS PRIMARY LEARNER NONE   CO-LEARNER CAREGIVER No   PRIMARY LANGUAGE ENGLISH   LEARNER PREFERENCE PRIMARY DEMONSTRATION   ANSWERED BY patient   RELATIONSHIP SELF        Depression Screening:   :       3 most recent PHQ Screens 2022   Little interest or pleasure in doing things Not at all   Feeling down, depressed, irritable, or hopeless Not at all   Total Score PHQ 2 0 Fall Risk Assessment:   :       Fall Risk Assessment, last 12 mths 9/26/2022   Able to walk? Yes   Fall in past 12 months? 0   Do you feel unsteady? 0   Are you worried about falling 0        Abuse Screening:   :       Abuse Screening Questionnaire 9/26/2022 7/23/2021   Do you ever feel afraid of your partner? N N   Are you in a relationship with someone who physically or mentally threatens you? N N   Is it safe for you to go home?  Y Y        ADL Screening:   :       ADL Assessment 9/26/2022   Feeding yourself No Help Needed   Getting from bed to chair No Help Needed   Getting dressed No Help Needed   Bathing or showering No Help Needed   Walk across the room (includes cane/walker) No Help Needed   Using the telphone No Help Needed   Taking your medications No Help Needed   Preparing meals No Help Needed   Managing money (expenses/bills) No Help Needed   Moderately strenuous housework (laundry) No Help Needed   Shopping for personal items (toiletries/medicines) No Help Needed   Shopping for groceries No Help Needed   Driving No Help Needed   Climbing a flight of stairs No Help Needed   Getting to places beyond walking distances No Help Needed

## 2022-09-26 NOTE — PROGRESS NOTES
CC:   Chief Complaint   Patient presents with    Knee Pain    Physical       HISTORY OF PRESENT ILLNESS  Eric Barnard III is a 61 y.o. male. Presents for annual physical exam. He has HTN, hyperlipidemia, prediabetes, chronic venous insufficiency, allergic rhinitis, family history of prostate cancer, and family history of colon cancer. Complains of left knee pain for past 1.5 weeks. Started after doing some sprinting. Constant ache, 5/10 in severity, mild swelling. Worse with standing. Using ice on it. Denies HA's, CP, SOB, dizziness, palpitations, or muscle cramps. Wears compression socks for varicose veins and leg swelling. Compliant with amlodipine, atorvastatin, and low-salt, low-fat diet. ROS  A complete review of systems was performed and is negative except for those mentioned in the HPI. Patient Active Problem List   Diagnosis Code    Family history of colon cancer Z80.0    Family history of prostate cancer Z80.42    Palpitations R00.2    Essential hypertension I10    Hyperlipidemia E78.5    Allergic rhinitis J30.9    Overweight (BMI 25.0-29. 9) E66.3    Right inguinal hernia K40.90    Venous insufficiency (chronic) (peripheral) I87.2     Past Medical History:   Diagnosis Date    Allergic rhinitis     Atrial fibrillation (HCC)     Family history of colon cancer     Family history of prostate cancer     Heart palpitations 2011    Event monitor: occ PVC's. Echo nl. Eval by Dr. Alfred Mcgregor    Hyperlipidemia     Hypertension     Low back pain 4/3/2012    Began  Mid 2011 located in the lower back without radiation. Worse in the AM getting out of bed , not constant, better with ibuprofen. Does a lot of lifting, kids and stones last summer.  No sciatica or leg weakness      Lumbar back pain     Murmur      No Known Allergies    Current Outpatient Medications   Medication Sig Dispense Refill    amLODIPine (NORVASC) 10 mg tablet TAKE 1 TABLET DAILY 90 Tablet 1    atorvastatin (LIPITOR) 10 mg tablet TAKE 1 TABLET ONCE DAILY   FOR CHOLESTEROL 90 Tablet 3    ipratropium (ATROVENT) 42 mcg (0.06 %) nasal spray 1 [place] by Both Nostrils route as needed. cetirizine (ZYRTEC) 10 mg tablet Take 10 mg by mouth daily as needed for Allergies or PRN Reason (Other). multivitamin (ONE A DAY) tablet Take 1 Tab by mouth daily. ibuprofen (MOTRIN) 200 mg tablet Take 200 mg by mouth as needed. PHYSICAL EXAM  Visit Vitals  BP (!) 142/91 (BP 1 Location: Left upper arm, BP Patient Position: Sitting, BP Cuff Size: Large adult)   Pulse 92   Resp 18   Ht 5' 7\" (1.702 m)   Wt 182 lb 6.4 oz (82.7 kg)   SpO2 98%   BMI 28.57 kg/m²   7 lb weight increase since last year    General: Well-developed and well-nourished, no distress. HEENT:  Head normocephalic/atraumatic, no scleral icterus  Neck: Supple. No carotid bruits, JVD, lymphadenopathy, or thyromegaly. Lungs:  Clear to ausculation bilaterally. Good air movement. Heart:  Regular rate and rhythm, normal S1 and S2, no murmur, gallop, or rub  Abdomen: Soft, non-distended, normal bowel sounds, no tenderness, no guarding, masses, rebound tenderness, or HSM. Extremities: No clubbing, cyanosis, or edema. Wearing compression socks. Neurological: Alert and oriented. Psychiatric: Normal mood and affect. Behavior is normal.     Labs drawn 9/21/22:  Results for orders placed or performed in visit on 56/93/13   METABOLIC PANEL, COMPREHENSIVE   Result Value Ref Range    Glucose 93 65 - 99 mg/dL    BUN 15 6 - 24 mg/dL    Creatinine 1.01 0.76 - 1.27 mg/dL    eGFR 86 >59 mL/min/1.73    BUN/Creatinine ratio 15 9 - 20    Sodium 139 134 - 144 mmol/L    Potassium 5.4 (H) 3.5 - 5.2 mmol/L    Chloride 99 96 - 106 mmol/L    CO2 25 20 - 29 mmol/L    Calcium 10.8 (H) 8.7 - 10.2 mg/dL    Protein, total 7.7 6.0 - 8.5 g/dL    Albumin 5.2 (H) 3.8 - 4.9 g/dL    GLOBULIN, TOTAL 2.5 1.5 - 4.5 g/dL    A-G Ratio 2.1 1.2 - 2.2    Bilirubin, total 0.7 0.0 - 1.2 mg/dL    Alk.  phosphatase 70 44 - 121 IU/L    AST (SGOT) 31 0 - 40 IU/L    ALT (SGPT) 22 0 - 44 IU/L   CBC WITH AUTOMATED DIFF   Result Value Ref Range    WBC 5.2 3.4 - 10.8 x10E3/uL    RBC 5.86 (H) 4.14 - 5.80 x10E6/uL    HGB 15.2 13.0 - 17.7 g/dL    HCT 46.1 37.5 - 51.0 %    MCV 79 79 - 97 fL    MCH 25.9 (L) 26.6 - 33.0 pg    MCHC 33.0 31.5 - 35.7 g/dL    RDW 13.3 11.6 - 15.4 %    PLATELET 483 261 - 496 x10E3/uL    NEUTROPHILS 54 Not Estab. %    Lymphocytes 36 Not Estab. %    MONOCYTES 8 Not Estab. %    EOSINOPHILS 1 Not Estab. %    BASOPHILS 1 Not Estab. %    ABS. NEUTROPHILS 2.9 1.4 - 7.0 x10E3/uL    Abs Lymphocytes 1.9 0.7 - 3.1 x10E3/uL    ABS. MONOCYTES 0.4 0.1 - 0.9 x10E3/uL    ABS. EOSINOPHILS 0.0 0.0 - 0.4 x10E3/uL    ABS. BASOPHILS 0.0 0.0 - 0.2 x10E3/uL    IMMATURE GRANULOCYTES 0 Not Estab. %    ABS. IMM. GRANS. 0.0 0.0 - 0.1 x10E3/uL   LIPID PANEL   Result Value Ref Range    Cholesterol, total 217 (H) 100 - 199 mg/dL    Triglyceride 39 0 - 149 mg/dL    HDL Cholesterol 121 >39 mg/dL    VLDL, calculated 7 5 - 40 mg/dL    LDL, calculated 89 0 - 99 mg/dL   PSA SCREENING (SCREENING)   Result Value Ref Range    Prostate Specific Ag 1.8 0.0 - 4.0 ng/mL   PTH INTACT   Result Value Ref Range    PTH, Intact 38 15 - 65 pg/mL         ASSESSMENT AND PLAN    ICD-10-CM ICD-9-CM    1. Essential hypertension  I10 401.9 nebivoloL (BYSTOLIC) 5 mg tablet      2. Mixed hyperlipidemia  E78.2 272.2       3. Acute pain of left knee  M25.562 719.46 ibuprofen (MOTRIN) 600 mg tablet      4. Asymptomatic varicose veins of both lower extremities  I83.93 454.9       5.  Hyperkalemia  E87.5 276.7 POTASSIUM      POTASSIUM      6. Hypercalcemia  E83.52 275.42 PTH-RELATED PEPTIDE      VITAMIN D, 1, 25 DIHYDROXY      VITAMIN D, 25 HYDROXY      THYROID CASCADE PROFILE      PROTEIN ELECTROPHORESIS      PTH-RELATED PEPTIDE      VITAMIN D, 1, 25 DIHYDROXY      VITAMIN D, 25 HYDROXY      THYROID CASCADE PROFILE      PROTEIN ELECTROPHORESIS        Discussed lab results from 9/21/22. K+ a little high. On no medications that should increase K+. Ca high despite no longer taking HCTZ (stopped in May 2021); normal PTH. Will order additional tests to evaluate (PTHrP, 1,25-diOH vit D, 25-OH D, SPEP, serum free light chain assay, TSH). Rest of labs normal, including kidney and liver tests, blood counts, cholesterol (tot chol high due to high HDL), and PSA. Diagnoses and all orders for this visit:    1. Essential hypertension  Not controlled on amlodipine 10 mg daily. Will add Bystolic.  -     nebivoloL (BYSTOLIC) 5 mg tablet; Take 1 Tablet by mouth daily. 2. Mixed hyperlipidemia  Well-controlled on atorvastatin 10 mg daily. 3. Acute pain of left knee  Most likely due to patellofemoral syndrome.  -     Start ibuprofen (MOTRIN) 600 mg tablet; Take 1 Tablet by mouth three (3) times daily (with meals) for 5 days. 4. Asymptomatic varicose veins of both lower extremities  Follow up with Dr. Delfin Contreras. 5. Hyperkalemia  Likely artifactual  due to hemolysis. Will recheck in 2 weeks. -     POTASSIUM; Future    6. Hypercalcemia  PTH normal so hyperparathyroidism unlikely. Rule out vitamin D deficiency, sarcoidosis, occult malignancy, or multiple myeloma. -     PTH-RELATED PEPTIDE; Future  -     VITAMIN D, 1, 25 DIHYDROXY; Future  -     VITAMIN D, 25 HYDROXY; Future  -     THYROID CASCADE PROFILE; Future  -     PROTEIN ELECTROPHORESIS; Future      Follow-up and Dispositions    Return in about 6 weeks (around 11/7/2022), or if symptoms worsen or fail to improve, for HTN (okay to use same day); bring BP log and home BP monitor to clinic. I have discussed the diagnosis with the patient and the intended plan as seen in the above orders. Patient is in agreement. The patient has received an after-visit summary and questions were answered concerning future plans. I have discussed medication side effects and warnings with the patient as well.

## 2022-09-26 NOTE — PATIENT INSTRUCTIONS
Vaccine Information Statement    Influenza (Flu) Vaccine (Inactivated or Recombinant): What You Need to Know    Many vaccine information statements are available in [place] and other languages. See www.immunize.org/vis. Hojas de información sobre vacunas están disponibles en español y en muchos otros idiomas. Visite www.immunize.org/vis. 1. Why get vaccinated? Influenza vaccine can prevent influenza (flu). Flu is a contagious disease that spreads around the [facility] every year, usually between October and May. Anyone can get the flu, but it is more dangerous for some people. Infants and young children, people 72 years and older, pregnant people, and people with certain health conditions or a weakened immune system are at greatest risk of flu complications. Pneumonia, bronchitis, sinus infections, and ear infections are examples of flu-related complications. If you have a medical condition, such as heart disease, cancer, or diabetes, flu can make it worse. Flu can cause fever and chills, sore throat, muscle aches, fatigue, cough, headache, and runny or stuffy nose. Some people may have vomiting and diarrhea, though this is more common in children than adults. In an average year, thousands of people in the [facility] die from flu, and many more are hospitalized. Flu vaccine prevents millions of illnesses and flu-related visits to the doctor each year. 2. Influenza vaccines     CDC recommends everyone 6 months and older get vaccinated every flu season. Children 6 months through 6years of age may need 2 doses during a single flu season. Everyone else needs only 1 dose each flu season. It takes about 2 weeks for protection to develop after vaccination. There are many flu viruses, and they are always changing. Each year a new flu vaccine is made to protect against the influenza viruses believed to be likely to cause disease in the upcoming flu season.  Even when the vaccine doesnt exactly match these viruses, it may still provide some protection. Influenza vaccine does not cause flu. Influenza vaccine may be given at the same time as other vaccines. 3. Talk with your health care provider    Tell your vaccination provider if the person getting the vaccine:  Has had an allergic reaction after a previous dose of influenza vaccine, or has any severe, life-threatening allergies   Has ever had Guillain-Barré Syndrome (also called GBS)    In some cases, your health care provider may decide to postpone influenza vaccination until a future visit. Influenza vaccine can be administered at any time during pregnancy. People who are or will be pregnant during influenza season should receive inactivated influenza vaccine. People with minor illnesses, such as a cold, may be vaccinated. People who are moderately or severely ill should usually wait until they recover before getting influenza vaccine. Your health care provider can give you more information. 4. Risks of a vaccine reaction    Soreness, redness, and swelling where the shot is given, fever, muscle aches, and headache can happen after influenza vaccination. There may be a very small increased risk of Guillain-Barré Syndrome (GBS) after inactivated influenza vaccine (the flu shot). [facility] children who get the flu shot along with pneumococcal vaccine (PCV13) and/or DTaP vaccine at the same time might be slightly more likely to have a seizure caused by fever. Tell your health care provider if a child who is getting flu vaccine has ever had a seizure. People sometimes faint after medical procedures, including vaccination. Tell your provider if you feel dizzy or have vision changes or ringing in the ears. As with any medicine, there is a very remote chance of a vaccine causing a severe allergic reaction, other serious injury, or death. 5. What if there is a serious problem?     An allergic reaction could occur after the vaccinated person leaves the clinic. If you see signs of a severe allergic reaction (hives, swelling of the face and throat, difficulty breathing, a fast heartbeat, dizziness, or weakness), call 9-1-1 and get the person to the nearest hospital.    For other signs that concern you, call your health care provider. Adverse reactions should be reported to the Vaccine Adverse Event Reporting System (VAERS). Your health care provider will usually file this report, or you can do it yourself. Visit the VAERS website at www.vaers. [facility].gov or call 0-796.810.8524. VAERS is only for reporting reactions, and VAERS staff members do not give medical advice. 6. The National Vaccine Injury Compensation Program    The [facility] Vaccine Injury Compensation Program (VICP) is a federal program that was created to compensate people who may have been injured by certain vaccines. Claims regarding alleged injury or death due to vaccination have a time limit for filing, which may be as short as two years. Visit the VICP website at www.[facility].gov/vaccinecompensation or call 9-818.734.2809 to learn about the program and about filing a claim. 7. How can I learn more? Ask your health care provider. Call your local or state health department. Visit the website of the Food and Drug Administration (FDA) for vaccine package inserts and additional information at www.fda.gov/vaccines-blood-biologics/vaccines. Contact the Centers for Disease Control and Prevention (CDC): Call 8-720.153.9509 (1-800-CDC-INFO) or  Visit CDCs influenza website at www.cdc.gov/flu. Vaccine Information Statement   Inactivated Influenza Vaccine   8/6/2021  [address] [id number]-87   Department of Health and Human Services  Centers for Disease Control and Prevention    Office Use Only

## 2022-09-28 ENCOUNTER — PATIENT MESSAGE (OUTPATIENT)
Dept: INTERNAL MEDICINE CLINIC | Age: 57
End: 2022-09-28

## 2022-09-28 DIAGNOSIS — I10 ESSENTIAL HYPERTENSION: Primary | ICD-10-CM

## 2022-09-29 RX ORDER — BENAZEPRIL HYDROCHLORIDE 10 MG/1
10 TABLET ORAL DAILY
Qty: 30 TABLET | Refills: 2 | Status: SHIPPED | OUTPATIENT
Start: 2022-09-29 | End: 2022-09-29 | Stop reason: ALTCHOICE

## 2022-09-30 NOTE — TELEPHONE ENCOUNTER
From: He Costa III  To: Anson Cockayne, MD  Sent: 9/28/2022 9:57 AM EDT  Subject: nebivoloL     Good Morning Dr. Martin Bhatia. Did a little reading on nebivoloL . Are there other options other than a beta blocker? Concerned about the slower heart rate. Also concerned about the alcohol warnings. As an aside. Renetta Money Renetta Money I've mentioned that I'm always anxious at the doctor's office. I expect my heartrate and bp to be up a little. I've tested my hr throughout the past couple of days. ..it was just 73 sitting at my desk. Thanks for your time.  Alanna Linn

## 2022-10-04 RX ORDER — BENAZEPRIL HYDROCHLORIDE 10 MG/1
10 TABLET ORAL DAILY
Qty: 90 TABLET | Refills: 0 | Status: SHIPPED | OUTPATIENT
Start: 2022-10-04 | End: 2022-10-24 | Stop reason: SDUPTHER

## 2022-10-24 DIAGNOSIS — I10 ESSENTIAL HYPERTENSION: ICD-10-CM

## 2022-10-24 RX ORDER — BENAZEPRIL HYDROCHLORIDE 10 MG/1
10 TABLET ORAL DAILY
Qty: 90 TABLET | Refills: 0 | Status: SHIPPED | OUTPATIENT
Start: 2022-10-24

## 2022-10-24 NOTE — TELEPHONE ENCOUNTER
PCP: Stephen Stephens MD     Last appt: 9/26/2022     Future Appointments   Date Time Provider Josh Paz   1/17/2023  2:20 PM Stephen Stephens MD [facility] BS AMB          Requested Prescriptions     Pending Prescriptions Disp Refills    benazepriL (LOTENSIN) 10 mg tablet 90 Tablet 0     Sig: Take 1 Tablet by mouth daily.

## 2022-10-25 DIAGNOSIS — I10 ESSENTIAL HYPERTENSION: ICD-10-CM

## 2022-10-25 RX ORDER — AMLODIPINE BESYLATE 10 MG/1
TABLET ORAL
Qty: 90 TABLET | Refills: 1 | Status: SHIPPED | OUTPATIENT
Start: 2022-10-25

## 2023-01-31 DIAGNOSIS — E78.2 MIXED HYPERLIPIDEMIA: ICD-10-CM

## 2023-01-31 DIAGNOSIS — I10 ESSENTIAL HYPERTENSION: ICD-10-CM

## 2023-01-31 RX ORDER — ATORVASTATIN CALCIUM 10 MG/1
TABLET, FILM COATED ORAL
Qty: 90 TABLET | Refills: 3 | Status: SHIPPED | OUTPATIENT
Start: 2023-01-31

## 2023-01-31 RX ORDER — BENAZEPRIL HYDROCHLORIDE 10 MG/1
10 TABLET ORAL DAILY
Qty: 90 TABLET | Refills: 0 | Status: SHIPPED | OUTPATIENT
Start: 2023-01-31

## 2023-01-31 NOTE — TELEPHONE ENCOUNTER
PCP: Arnoldo Greenberg MD     Last appt: 9/26/2022     Future Appointments   Date Time Provider Josh Paz   4/11/2023  9:10 AM Arnoldo Greenberg MD [facility] AMB          Requested Prescriptions     Pending Prescriptions Disp Refills    atorvastatin (LIPITOR) 10 mg tablet 90 Tablet 3     Sig: TAKE 1 TABLET ONCE DAILY   FOR CHOLESTEROL

## 2023-01-31 NOTE — TELEPHONE ENCOUNTER
Requested Prescriptions     Pending Prescriptions Disp Refills    benazepriL (LOTENSIN) 10 mg tablet 90 Tablet 0     Sig: Take 1 Tablet by mouth daily. Pt stated that he needs a 30 day supply sent to [facility] in [place].  Pt stated that the mail order told him it would take a while and pt is out of this medication

## 2023-01-31 NOTE — TELEPHONE ENCOUNTER
PCP: Timothy Castro MD     Last appt: 9/26/2022     Future Appointments   Date Time Provider Josh Paz   4/11/2023  9:10 AM Timothy Castro MD [facility] BS AMB          Requested Prescriptions     Pending Prescriptions Disp Refills    benazepriL (LOTENSIN) 10 mg tablet 90 Tablet 0     Sig: Take 1 Tablet by mouth daily.

## 2023-04-23 DIAGNOSIS — E83.52 HYPERCALCEMIA: Primary | ICD-10-CM

## 2023-04-23 PROBLEM — R00.2 HEART PALPITATIONS: Status: ACTIVE | Noted: 2023-04-23

## 2023-04-24 DIAGNOSIS — Z12.5 SCREENING FOR PROSTATE CANCER: Primary | ICD-10-CM

## 2023-04-28 DIAGNOSIS — E78.2 MIXED HYPERLIPIDEMIA: Primary | ICD-10-CM

## 2023-07-11 ENCOUNTER — TELEPHONE (OUTPATIENT)
Facility: CLINIC | Age: 58
End: 2023-07-11

## 2023-07-11 NOTE — TELEPHONE ENCOUNTER
Called pt verified name and . Pt stated that he has been having dull/achy pain in his right testicle on and off for about 6 weeks. Pt stated pain does not change with activity and he has not taken any medication for relief. Please advise.

## 2023-07-11 NOTE — TELEPHONE ENCOUNTER
----- Message from Harshil Ruiz sent at 7/11/2023  8:36 AM EDT -----  Regarding: groin pain  Contact: [phone]  having some dull pain in my right testicle.

## 2023-07-11 NOTE — TELEPHONE ENCOUNTER
----- Message from Harshil Ruiz sent at 7/11/2023  8:36 AM EDT -----  Regarding: groin pain  Contact: [phone]  having some dull pain in my right testicle.

## 2023-07-12 ENCOUNTER — OFFICE VISIT (OUTPATIENT)
Facility: CLINIC | Age: 58
End: 2023-07-12
Payer: COMMERCIAL

## 2023-07-12 VITALS
HEIGHT: 70 IN | SYSTOLIC BLOOD PRESSURE: 131 MMHG | TEMPERATURE: 97.9 F | DIASTOLIC BLOOD PRESSURE: 89 MMHG | WEIGHT: 184.4 LBS | OXYGEN SATURATION: 98 % | HEART RATE: 65 BPM | BODY MASS INDEX: 26.4 KG/M2 | RESPIRATION RATE: 16 BRPM

## 2023-07-12 DIAGNOSIS — R10.31 GROIN PAIN, RIGHT: Primary | ICD-10-CM

## 2023-07-12 PROCEDURE — 3075F SYST BP GE 130 - 139MM HG: CPT | Performed by: INTERNAL MEDICINE

## 2023-07-12 PROCEDURE — 99213 OFFICE O/P EST LOW 20 MIN: CPT | Performed by: INTERNAL MEDICINE

## 2023-07-12 PROCEDURE — 3079F DIAST BP 80-89 MM HG: CPT | Performed by: INTERNAL MEDICINE

## 2023-07-12 RX ORDER — AMLODIPINE BESYLATE 5 MG/1
5 TABLET ORAL DAILY
COMMUNITY
Start: 2023-04-17

## 2023-07-12 RX ORDER — IBUPROFEN 600 MG/1
600 TABLET ORAL 3 TIMES DAILY PRN
Qty: 30 TABLET | Refills: 0 | Status: SHIPPED | OUTPATIENT
Start: 2023-07-12

## 2023-07-12 RX ORDER — METOPROLOL SUCCINATE 25 MG/1
25 TABLET, EXTENDED RELEASE ORAL DAILY
COMMUNITY
Start: 2023-04-18

## 2023-07-12 SDOH — ECONOMIC STABILITY: FOOD INSECURITY: WITHIN THE PAST 12 MONTHS, THE FOOD YOU BOUGHT JUST DIDN'T LAST AND YOU DIDN'T HAVE MONEY TO GET MORE.: NEVER TRUE

## 2023-07-12 SDOH — ECONOMIC STABILITY: FOOD INSECURITY: WITHIN THE PAST 12 MONTHS, YOU WORRIED THAT YOUR FOOD WOULD RUN OUT BEFORE YOU GOT MONEY TO BUY MORE.: NEVER TRUE

## 2023-07-12 SDOH — ECONOMIC STABILITY: HOUSING INSECURITY
IN THE LAST 12 MONTHS, WAS THERE A TIME WHEN YOU DID NOT HAVE A STEADY PLACE TO SLEEP OR SLEPT IN A SHELTER (INCLUDING NOW)?: NO

## 2023-07-12 SDOH — ECONOMIC STABILITY: INCOME INSECURITY: HOW HARD IS IT FOR YOU TO PAY FOR THE VERY BASICS LIKE FOOD, HOUSING, MEDICAL CARE, AND HEATING?: NOT HARD AT ALL

## 2023-10-17 ENCOUNTER — OFFICE VISIT (OUTPATIENT)
Facility: CLINIC | Age: 58
End: 2023-10-17
Payer: COMMERCIAL

## 2023-10-17 VITALS
RESPIRATION RATE: 14 BRPM | WEIGHT: 183.4 LBS | HEIGHT: 70 IN | BODY MASS INDEX: 26.26 KG/M2 | SYSTOLIC BLOOD PRESSURE: 153 MMHG | HEART RATE: 88 BPM | TEMPERATURE: 98.3 F | DIASTOLIC BLOOD PRESSURE: 89 MMHG

## 2023-10-17 DIAGNOSIS — E83.52 HYPERCALCEMIA: ICD-10-CM

## 2023-10-17 DIAGNOSIS — I10 ESSENTIAL HYPERTENSION: Primary | ICD-10-CM

## 2023-10-17 DIAGNOSIS — Z12.5 SCREENING FOR PROSTATE CANCER: ICD-10-CM

## 2023-10-17 DIAGNOSIS — Z11.4 SCREENING FOR HIV (HUMAN IMMUNODEFICIENCY VIRUS): ICD-10-CM

## 2023-10-17 DIAGNOSIS — E78.2 MIXED HYPERLIPIDEMIA: ICD-10-CM

## 2023-10-17 PROCEDURE — 3077F SYST BP >= 140 MM HG: CPT | Performed by: INTERNAL MEDICINE

## 2023-10-17 PROCEDURE — 99213 OFFICE O/P EST LOW 20 MIN: CPT | Performed by: INTERNAL MEDICINE

## 2023-10-17 PROCEDURE — 3079F DIAST BP 80-89 MM HG: CPT | Performed by: INTERNAL MEDICINE

## 2023-10-17 RX ORDER — BENAZEPRIL HYDROCHLORIDE 20 MG/1
20 TABLET ORAL DAILY
Qty: 90 TABLET | Refills: 1 | Status: SHIPPED | OUTPATIENT
Start: 2023-10-17

## 2023-10-17 RX ORDER — OMEGA-3S/DHA/EPA/FISH OIL/D3 300MG-1000
CAPSULE ORAL DAILY
COMMUNITY

## 2023-10-17 RX ORDER — RIBOFLAVIN (VITAMIN B2) 100 MG
500 TABLET ORAL
COMMUNITY

## 2023-10-17 SDOH — ECONOMIC STABILITY: INCOME INSECURITY: HOW HARD IS IT FOR YOU TO PAY FOR THE VERY BASICS LIKE FOOD, HOUSING, MEDICAL CARE, AND HEATING?: NOT HARD AT ALL

## 2023-10-17 SDOH — ECONOMIC STABILITY: FOOD INSECURITY: WITHIN THE PAST 12 MONTHS, YOU WORRIED THAT YOUR FOOD WOULD RUN OUT BEFORE YOU GOT MONEY TO BUY MORE.: NEVER TRUE

## 2023-10-17 SDOH — ECONOMIC STABILITY: TRANSPORTATION INSECURITY
IN THE PAST 12 MONTHS, HAS LACK OF TRANSPORTATION KEPT YOU FROM MEETINGS, WORK, OR FROM GETTING THINGS NEEDED FOR DAILY LIVING?: NO

## 2023-10-17 NOTE — PROGRESS NOTES
Chief Complaint   Patient presents with    Hypertension    Cholesterol Problem       HISTORY OF PRESENT ILLNESS  Garcia Guevara is a 60 y.o. male    Presents for follow up evaluation of HTN and hyperlipidemia. Not monitoring home BP. Tries to stay on low-salt diet. No exercise recently. Compliant with BP medications. Denies headaches, CP, SOB, dizziness, heart palpitations, muscle cramps, or leg swelling. He forgot to have fasting labs done before this appointment. Flu: [facility] booster: had at pharmacy    Patient Active Problem List   Diagnosis    Essential hypertension    Allergic rhinitis    Hyperlipidemia    Overweight (BMI 25.0-29. 9)    Palpitations    Family history of prostate cancer    Right inguinal hernia    Venous insufficiency (chronic) (peripheral)    Family history of colon cancer    Asymptomatic varicose veins of both lower extremities    Hypercalcemia    Heart palpitations     Past Medical History:   Diagnosis Date    Allergic rhinitis     Atrial fibrillation ([address])     Family history of colon cancer     Family history of prostate cancer     Heart palpitations 2011    Event monitor: occ PVC's. Echo nl. Eval by Dr. Shantal Fiore    Hyperlipidemia     Hypertension     Low back pain 4/3/2012    Began  Mid 2011 located in the lower back without radiation. Worse in the AM getting out of bed , not constant, better with ibuprofen. Does a lot of lifting, kids and stones last summer.  No sciatica or leg weakness      Lumbar back pain     Murmur      No Known Allergies    Current Outpatient Medications   Medication Sig Dispense Refill    Ascorbic Acid (VITAMIN C) 100 MG tablet Take 5 tablets by mouth      vitamin D3 (CHOLECALCIFEROL) 10 MCG (400 UNIT) TABS tablet Take by mouth daily      Multiple Vitamins-Minerals (MULTIVITAMIN ADULT EXTRA C PO) Take 1 tablet by mouth daily      amLODIPine (NORVASC) 5 MG tablet Take 1 tablet by mouth daily      metoprolol succinate (TOPROL XL) 25 MG extended

## 2023-10-18 LAB
ALBUMIN SERPL-MCNC: 4.6 G/DL (ref 3.5–5)
ALBUMIN/GLOB SERPL: 1.5 (ref 1.1–2.2)
ALP SERPL-CCNC: 65 U/L (ref 45–117)
ALT SERPL-CCNC: 34 U/L (ref 12–78)
ANION GAP SERPL CALC-SCNC: 5 MMOL/L (ref 5–15)
AST SERPL-CCNC: 20 U/L (ref 15–37)
BASOPHILS # BLD: 0.1 K/UL (ref 0–0.1)
BASOPHILS NFR BLD: 1 % (ref 0–1)
BILIRUB SERPL-MCNC: 0.5 MG/DL (ref 0.2–1)
BUN SERPL-MCNC: 9 MG/DL (ref 6–20)
BUN/CREAT SERPL: 8 (ref 12–20)
CALCIUM SERPL-MCNC: 9.7 MG/DL (ref 8.5–10.1)
CALCIUM SERPL-MCNC: 9.8 MG/DL (ref 8.5–10.1)
CHLORIDE SERPL-SCNC: 105 MMOL/L (ref 97–108)
CHOLEST SERPL-MCNC: 186 MG/DL
CO2 SERPL-SCNC: 29 MMOL/L (ref 21–32)
CREAT SERPL-MCNC: 1.13 MG/DL (ref 0.7–1.3)
DIFFERENTIAL METHOD BLD: ABNORMAL
EOSINOPHIL # BLD: 0.2 K/UL (ref 0–0.4)
EOSINOPHIL NFR BLD: 4 % (ref 0–7)
ERYTHROCYTE [DISTWIDTH] IN BLOOD BY AUTOMATED COUNT: 14.1 % (ref 11.5–14.5)
GLOBULIN SER CALC-MCNC: 3 G/DL (ref 2–4)
GLUCOSE SERPL-MCNC: 103 MG/DL (ref 65–100)
HCT VFR BLD AUTO: 44.6 % (ref 36.6–50.3)
HDLC SERPL-MCNC: 104 MG/DL
HDLC SERPL: 1.8 (ref 0–5)
HGB BLD-MCNC: 13.9 G/DL (ref 12.1–17)
HIV 1+2 AB+HIV1 P24 AG SERPL QL IA: NONREACTIVE
HIV 1/2 RESULT COMMENT: NORMAL
IMM GRANULOCYTES # BLD AUTO: 0 K/UL (ref 0–0.04)
IMM GRANULOCYTES NFR BLD AUTO: 0 % (ref 0–0.5)
LDLC SERPL CALC-MCNC: 74.6 MG/DL (ref 0–100)
LYMPHOCYTES # BLD: 1.3 K/UL (ref 0.8–3.5)
LYMPHOCYTES NFR BLD: 30 % (ref 12–49)
MCH RBC QN AUTO: 25.6 PG (ref 26–34)
MCHC RBC AUTO-ENTMCNC: 31.2 G/DL (ref 30–36.5)
MCV RBC AUTO: 82.3 FL (ref 80–99)
MONOCYTES # BLD: 0.5 K/UL (ref 0–1)
MONOCYTES NFR BLD: 10 % (ref 5–13)
NEUTS SEG # BLD: 2.5 K/UL (ref 1.8–8)
NEUTS SEG NFR BLD: 55 % (ref 32–75)
NRBC # BLD: 0 K/UL (ref 0–0.01)
NRBC BLD-RTO: 0 PER 100 WBC
PLATELET # BLD AUTO: 339 K/UL (ref 150–400)
PMV BLD AUTO: 10.2 FL (ref 8.9–12.9)
POTASSIUM SERPL-SCNC: 5 MMOL/L (ref 3.5–5.1)
PROT SERPL-MCNC: 7.6 G/DL (ref 6.4–8.2)
PSA SERPL-MCNC: 2.2 NG/ML (ref 0.01–4)
PTH-INTACT SERPL-MCNC: 50 PG/ML (ref 18.4–88)
RBC # BLD AUTO: 5.42 M/UL (ref 4.1–5.7)
SODIUM SERPL-SCNC: 139 MMOL/L (ref 136–145)
TRIGL SERPL-MCNC: 37 MG/DL
VLDLC SERPL CALC-MCNC: 7.4 MG/DL
WBC # BLD AUTO: 4.6 K/UL (ref 4.1–11.1)

## 2023-12-11 ENCOUNTER — TELEPHONE (OUTPATIENT)
Facility: CLINIC | Age: 58
End: 2023-12-11

## 2023-12-11 ENCOUNTER — PATIENT MESSAGE (OUTPATIENT)
Facility: CLINIC | Age: 58
End: 2023-12-11

## 2023-12-11 DIAGNOSIS — U07.1 COVID-19: Primary | ICD-10-CM

## 2023-12-11 NOTE — TELEPHONE ENCOUNTER
From: Elver Self  To: Dr. Charleen Beasleyught: 12/11/2023 7:17 AM EST  Subject: COVID and Mucinex    Thanks for responding to me this weekend     Just want to confirm the Mucinex use. I should or should not have use Mucinex DM? Concerned about its use with high BP    THANKS.  Jill Kraft

## 2023-12-11 NOTE — TELEPHONE ENCOUNTER
On Call Phone Message: Received call from patient yesterday that he tested positive for COVID the day before and had congestion. Waned to know if it is okay to take Mucinex. I said yes. He can take Mucinex or Mucinex DM.

## 2023-12-13 ENCOUNTER — TELEPHONE (OUTPATIENT)
Facility: CLINIC | Age: 58
End: 2023-12-13

## 2023-12-13 NOTE — TELEPHONE ENCOUNTER
Patient called and sent a message in my chart about to proceed with the covid medication. Patient stated you can respond on the my chart.

## 2024-01-11 DIAGNOSIS — I10 ESSENTIAL HYPERTENSION: ICD-10-CM

## 2024-01-11 RX ORDER — BENAZEPRIL HYDROCHLORIDE 20 MG/1
20 TABLET ORAL DAILY
Qty: 30 TABLET | Refills: 2 | Status: SHIPPED | OUTPATIENT
Start: 2024-01-11

## 2024-01-11 NOTE — TELEPHONE ENCOUNTER
PCP: Lindsay Ríos MD     Last appt:  10/17/2023    No future appointments.       Requested Prescriptions     Pending Prescriptions Disp Refills    benazepril (LOTENSIN) 20 MG tablet 90 tablet 1     Sig: Take 1 tablet by mouth daily

## 2024-02-28 ENCOUNTER — OFFICE VISIT (OUTPATIENT)
Facility: CLINIC | Age: 59
End: 2024-02-28
Payer: COMMERCIAL

## 2024-02-28 VITALS
RESPIRATION RATE: 16 BRPM | WEIGHT: 181 LBS | TEMPERATURE: 97.9 F | OXYGEN SATURATION: 98 % | HEIGHT: 70 IN | HEART RATE: 87 BPM | DIASTOLIC BLOOD PRESSURE: 90 MMHG | SYSTOLIC BLOOD PRESSURE: 149 MMHG | BODY MASS INDEX: 25.91 KG/M2

## 2024-02-28 DIAGNOSIS — M79.602 LEFT ARM PAIN: Primary | ICD-10-CM

## 2024-02-28 DIAGNOSIS — J30.2 SEASONAL ALLERGIC RHINITIS, UNSPECIFIED TRIGGER: ICD-10-CM

## 2024-02-28 DIAGNOSIS — I10 ESSENTIAL HYPERTENSION: ICD-10-CM

## 2024-02-28 PROCEDURE — 3079F DIAST BP 80-89 MM HG: CPT | Performed by: INTERNAL MEDICINE

## 2024-02-28 PROCEDURE — 3077F SYST BP >= 140 MM HG: CPT | Performed by: INTERNAL MEDICINE

## 2024-02-28 PROCEDURE — 93000 ELECTROCARDIOGRAM COMPLETE: CPT | Performed by: INTERNAL MEDICINE

## 2024-02-28 PROCEDURE — 99214 OFFICE O/P EST MOD 30 MIN: CPT | Performed by: INTERNAL MEDICINE

## 2024-02-28 RX ORDER — AMLODIPINE BESYLATE 10 MG/1
10 TABLET ORAL DAILY
Qty: 30 TABLET | Refills: 3 | Status: SHIPPED | OUTPATIENT
Start: 2024-02-28

## 2024-02-28 ASSESSMENT — PATIENT HEALTH QUESTIONNAIRE - PHQ9
SUM OF ALL RESPONSES TO PHQ QUESTIONS 1-9: 0
1. LITTLE INTEREST OR PLEASURE IN DOING THINGS: 0
SUM OF ALL RESPONSES TO PHQ9 QUESTIONS 1 & 2: 0
SUM OF ALL RESPONSES TO PHQ QUESTIONS 1-9: 0
2. FEELING DOWN, DEPRESSED OR HOPELESS: 0

## 2024-02-28 NOTE — PROGRESS NOTES
Chief Complaint   Patient presents with    Pain     In elbow and wrist     Congestion     X 2-3 days, at home covid test done- Neg       HISTORY OF PRESENT ILLNESS  Bryan Carrillo is a 58 y.o. male    Complains of 5 day history of pain at left wrist, left elbow, and occasionally left shoulder. Sharp pain, lasts for seconds to a few minutes at a time, pain was 6/10 this morning, no pain currently. Worse with certain movements. No known injury or trauma. Plays basketball. Denies burning, tingling, or numbness at left hand or arm. Right-handed. Has taken no medication for this pain. Has occasional heart palpitations. Denies CP, SOB, dizziness, diaphoresis, or nausea.    Has throat and nasal congestion for past 1 week. Occasional runny nose and sneezing.    Brought his home BP monitor: 147/79. Clinic BP monitor: 149/90    He had  Flu and Covid vaccines,     Patient Active Problem List   Diagnosis    Essential hypertension    Allergic rhinitis    Hyperlipidemia    Overweight (BMI 25.0-29.9)    Palpitations    Family history of prostate cancer    Right inguinal hernia    Venous insufficiency (chronic) (peripheral)    Family history of colon cancer    Asymptomatic varicose veins of both lower extremities    Hypercalcemia    Heart palpitations     Past Medical History:   Diagnosis Date    Allergic rhinitis     Atrial fibrillation (HCC)     Family history of colon cancer     Family history of prostate cancer     Heart palpitations 2011    Event monitor: occ PVC's. Echo nl. Eval by Dr. Jean    Hyperlipidemia     Hypertension     Low back pain 4/3/2012    Began  Mid 2011 located in the lower back without radiation. Worse in the AM getting out of bed , not constant, better with ibuprofen.  Does a lot of lifting, kids and stones last summer. No sciatica or leg weakness      Lumbar back pain     Murmur      No Known Allergies    Current Outpatient Medications   Medication Sig Dispense Refill    benazepril (LOTENSIN) 20 MG

## 2024-02-28 NOTE — PROGRESS NOTES
Bryan Carrillo  Identified pt with two pt identifiers(name and ).  Chief Complaint   Patient presents with    Pain     In elbow and wrist        1. Have you been to the ER, urgent care clinic since your last visit?  Hospitalized since your last visit? NO    2. Have you seen or consulted any other health care providers outside of the [facility] since your last visit?  Include any pap smears or colon screening. NO  Pt had Flu and Covid vaccines,     Provider notified of reason for visit, vitals and flowsheets obtained on patients.     Patient received paperwork for advance directive during previous visit but has not completed at this time     Reviewed record In preparation for visit, huddled with provider and have obtained necessary documentation      Health Maintenance Due   Topic    Flu vaccine (1)    COVID-19 Vaccine ( season)    Diabetes screen        Wt Readings from Last 3 Encounters:   24 82.1 kg (181 lb)   10/17/23 83.2 kg (183 lb 6.4 oz)   23 83.6 kg (184 lb 6.4 oz)     Temp Readings from Last 3 Encounters:   24 97.9 °F (36.6 °C) (Oral)   10/17/23 98.3 °F (36.8 °C) (Oral)   23 97.9 °F (36.6 °C) (Oral)     BP Readings from Last 3 Encounters:   24 (!) 162/88   10/17/23 (!) 153/89   23 131/89     Pulse Readings from Last 3 Encounters:   24 87   10/17/23 88   23 65          No data to display                  Learning Assessment:  :         10/17/2023     9:10 AM   [facility] AMB LEARNING ASSESSMENT   Primary Learner Patient   Primary Language ENGLISH   Learning Preference DEMONSTRATION    READING   Answered By Patient   Relationship to Learner SELF       Fall Risk Assessment:  :         2022     8:00 AM   Amb Fall Risk Assessment and TUG Test   Fall in past 12 months? 0   Able to walk? Yes       Abuse Screening:  :          No data to display                ADL Screening:  :         10/17/2023     9:00 AM   ADL ASSESSMENT   Feeding

## 2024-03-27 DIAGNOSIS — R10.31 GROIN PAIN, RIGHT: ICD-10-CM

## 2024-03-28 RX ORDER — IBUPROFEN 600 MG/1
TABLET ORAL
Qty: 30 TABLET | Refills: 0 | OUTPATIENT
Start: 2024-03-28

## 2024-03-28 NOTE — TELEPHONE ENCOUNTER
Pt had this refilled on 3/25/24 by nura GARLAND      PCP: Lindsay Ríos MD     Last appt:  2/28/2024      Future Appointments   Date Time Provider Department Center   6/11/2024  9:10 AM Lindsay Ríos MD [facility] BS AMB          Requested Prescriptions     Pending Prescriptions Disp Refills     MG tablet [Pharmacy Med Name: IBU TAB 600MG] 30 tablet 0     Sig: TAKE 1 TABLET 3 TIMES A DAYAS NEEDED FOR PAIN

## 2024-06-11 ENCOUNTER — OFFICE VISIT (OUTPATIENT)
Facility: CLINIC | Age: 59
End: 2024-06-11
Payer: COMMERCIAL

## 2024-06-11 VITALS
HEART RATE: 76 BPM | TEMPERATURE: 98.1 F | BODY MASS INDEX: 26.57 KG/M2 | RESPIRATION RATE: 16 BRPM | OXYGEN SATURATION: 98 % | HEIGHT: 70 IN | DIASTOLIC BLOOD PRESSURE: 86 MMHG | WEIGHT: 185.6 LBS | SYSTOLIC BLOOD PRESSURE: 139 MMHG

## 2024-06-11 DIAGNOSIS — R00.2 PALPITATIONS: ICD-10-CM

## 2024-06-11 DIAGNOSIS — I10 ESSENTIAL HYPERTENSION: Primary | ICD-10-CM

## 2024-06-11 DIAGNOSIS — R00.2 HEART PALPITATIONS: ICD-10-CM

## 2024-06-11 PROBLEM — E83.52 HYPERCALCEMIA: Status: RESOLVED | Noted: 2022-09-26 | Resolved: 2024-06-11

## 2024-06-11 LAB
ANION GAP SERPL CALC-SCNC: 3 MMOL/L (ref 5–15)
BUN SERPL-MCNC: 11 MG/DL (ref 6–20)
BUN/CREAT SERPL: 9 (ref 12–20)
CALCIUM SERPL-MCNC: 10.1 MG/DL (ref 8.5–10.1)
CHLORIDE SERPL-SCNC: 108 MMOL/L (ref 97–108)
CO2 SERPL-SCNC: 29 MMOL/L (ref 21–32)
CREAT SERPL-MCNC: 1.19 MG/DL (ref 0.7–1.3)
GLUCOSE SERPL-MCNC: 107 MG/DL (ref 65–100)
POTASSIUM SERPL-SCNC: 5.4 MMOL/L (ref 3.5–5.1)
SODIUM SERPL-SCNC: 140 MMOL/L (ref 136–145)

## 2024-06-11 PROCEDURE — 3079F DIAST BP 80-89 MM HG: CPT | Performed by: INTERNAL MEDICINE

## 2024-06-11 PROCEDURE — 3075F SYST BP GE 130 - 139MM HG: CPT | Performed by: INTERNAL MEDICINE

## 2024-06-11 PROCEDURE — 99213 OFFICE O/P EST LOW 20 MIN: CPT | Performed by: INTERNAL MEDICINE

## 2024-06-11 RX ORDER — BENAZEPRIL HYDROCHLORIDE 40 MG/1
TABLET ORAL
COMMUNITY
Start: 2024-04-22

## 2024-06-11 NOTE — PROGRESS NOTES
Bryan Carrillo  Identified pt with two pt identifiers(name and ).  Chief Complaint   Patient presents with    Hypertension       1. Have you been to the ER, urgent care clinic since your last visit?  Hospitalized since your last visit? NO    2. Have you seen or consulted any other health care providers outside of the [facility] since your last visit?  Include any pap smears or colon screening. NO      Provider notified of reason for visit, vitals and flowsheets obtained on patients.     Patient received paperwork for advance directive during previous visit but has not completed at this time     Reviewed record In preparation for visit, huddled with provider and have obtained necessary documentation      Health Maintenance Due   Topic    COVID-19 Vaccine ( season)    Diabetes screen        Wt Readings from Last 3 Encounters:   24 82.1 kg (181 lb)   10/17/23 83.2 kg (183 lb 6.4 oz)   23 83.6 kg (184 lb 6.4 oz)     Temp Readings from Last 3 Encounters:   24 97.9 °F (36.6 °C) (Oral)   10/17/23 98.3 °F (36.8 °C) (Oral)   23 97.9 °F (36.6 °C) (Oral)     BP Readings from Last 3 Encounters:   24 (!) 149/90   10/17/23 (!) 153/89   23 131/89     Pulse Readings from Last 3 Encounters:   24 87   10/17/23 88   23 65          No data to display                  Learning Assessment:  :         10/17/2023     9:10 AM   [facility] AMB LEARNING ASSESSMENT   Primary Learner Patient   Primary Language ENGLISH   Learning Preference DEMONSTRATION    READING   Answered By Patient   Relationship to Learner SELF       Fall Risk Assessment:  :         2022     8:00 AM   Amb Fall Risk Assessment and TUG Test   Fall in past 12 months? 0   Able to walk? Yes       Abuse Screening:  :          No data to display                ADL Screening:  :         10/17/2023     9:00 AM   ADL ASSESSMENT   Feeding yourself No Help Needed   Getting from bed to chair No Help Needed 
palpitations 2011    Event monitor: occ PVC's. Echo nl. Eval by Dr. Jean    Hyperlipidemia     Hypertension     Low back pain 4/3/2012    Began  Mid 2011 located in the lower back without radiation. Worse in the AM getting out of bed , not constant, better with ibuprofen.  Does a lot of lifting, kids and stones last summer. No sciatica or leg weakness      Lumbar back pain     Murmur      No Known Allergies  Current Outpatient Medications   Medication Sig Dispense Refill    benazepril (LOTENSIN) 40 MG tablet       amLODIPine (NORVASC) 10 MG tablet Take 1 tablet by mouth daily 30 tablet 3    Ascorbic Acid (VITAMIN C) 100 MG tablet Take 5 tablets by mouth      vitamin D3 (CHOLECALCIFEROL) 10 MCG (400 UNIT) TABS tablet Take by mouth daily      Multiple Vitamins-Minerals (MULTIVITAMIN ADULT EXTRA C PO) Take 1 tablet by mouth daily      metoprolol succinate (TOPROL XL) 25 MG extended release tablet Take 1 tablet by mouth daily      atorvastatin (LIPITOR) 10 MG tablet TAKE 1 TABLET ONCE DAILY   FOR CHOLESTEROL      cetirizine (ZYRTEC) 10 MG tablet Take 1 tablet by mouth daily as needed      ibuprofen (ADVIL;MOTRIN) 200 MG tablet Take 1 tablet by mouth as needed      ipratropium (ATROVENT) 0.06 % nasal spray 1 spray by Nasal route as needed       No current facility-administered medications for this visit.     Physical Exam  /86 (Site: Left Upper Arm, Position: Sitting)   Pulse 76   Temp 98.1 °F (36.7 °C) (Oral)   Resp 16   Ht 1.778 m (5' 10\")   Wt 84.2 kg (185 lb 9.6 oz)   SpO2 98%   BMI 26.63 kg/m²     General: Well-developed and well-nourished, no distress.  HEENT:  Head normocephalic/atraumatic, no scleral icterus  Neck: Supple. No carotid bruits, JVD, lymphadenopathy, or thyromegaly.  Lungs:  Clear to auscultation bilaterally. Good air movement.  Heart:  Regular rate and rhythm, normal S1 and S2, no murmur, gallop, or rub  Extremities: No clubbing, cyanosis, or edema. 2+ pedal pulses

## 2024-06-13 DIAGNOSIS — I10 ESSENTIAL HYPERTENSION: ICD-10-CM

## 2024-06-13 DIAGNOSIS — E87.6 HYPOKALEMIA: Primary | ICD-10-CM

## 2024-06-13 RX ORDER — AMLODIPINE BESYLATE 10 MG/1
10 TABLET ORAL DAILY
Qty: 90 TABLET | Refills: 0 | Status: SHIPPED | OUTPATIENT
Start: 2024-06-13

## 2024-06-13 NOTE — TELEPHONE ENCOUNTER
PCP: Lindsay Ríos MD     Last appt:  6/11/2024      Future Appointments   Date Time Provider Department Center   7/16/2024 10:20 AM Mona Sykes MD [facility] BS AMB   8/28/2024  8:10 AM Lindsay Ríos MD [facility] BS AMB          Requested Prescriptions     Pending Prescriptions Disp Refills    amLODIPine (NORVASC) 10 MG tablet 90 tablet 2     Sig: Take 1 tablet by mouth daily

## 2024-07-15 ASSESSMENT — SLEEP AND FATIGUE QUESTIONNAIRES
DO YOU TAKE NAPS: NO
HOW LIKELY ARE YOU TO NOD OFF OR FALL ASLEEP WHILE SITTING INACTIVE IN A PUBLIC PLACE: SLIGHT CHANCE OF DOZING
HOW LIKELY ARE YOU TO NOD OFF OR FALL ASLEEP WHILE SITTING AND TALKING TO SOMEONE: WOULD NEVER DOZE
HOW LIKELY ARE YOU TO NOD OFF OR FALL ASLEEP WHEN YOU ARE A PASSENGER IN A CAR FOR AN HOUR WITHOUT A BREAK: WOULD NEVER DOZE
DO YOU GET TOO LITTLE SLEEP AT NIGHT: YES
HOW LIKELY ARE YOU TO NOD OFF OR FALL ASLEEP WHILE WATCHING TV: SLIGHT CHANCE OF DOZING
HOW LIKELY ARE YOU TO NOD OFF OR FALL ASLEEP WHILE SITTING AND READING: SLIGHT CHANCE OF DOZING
DO YOU HAVE DIFFICULTY VISITING YOUR FAMILY OR FRIENDS IN THEIR HOME BECAUSE YOU BECOME SLEEPY OR TIRED: NO
DO YOU GET TOO LITTLE SLEEP AT NIGHT: YES
SELECT ANY OF THE FOLLOWING BEHAVIORS OBSERVED WHILE PATIENT ASLEEP: LOUD SNORING;LIGHT SNORING
HAS YOUR MOOD BEEN AFFECTED BECAUSE YOU ARE SLEEPY OR TIRED: YES, LITTLE
SELECT ANY OF THE FOLLOWING BEHAVIORS OBSERVED WHILE YOU ARE ASLEEP: LIGHT SNORING
DO YOU WORK SHIFTS: NO
FOSQ SCORE: 16.5
ARE YOU BOTHERED BY WAKING UP TOO EARLY AND NOT BEING ABLE TO GET BACK TO SLEEP: YES
HOW LIKELY ARE YOU TO NOD OFF OR FALL ASLEEP WHEN YOU ARE A PASSENGER IN A CAR FOR AN HOUR WITHOUT A BREAK: WOULD NEVER DOZE
AVERAGE NUMBER OF SLEEP HOURS: 6
HOW LIKELY ARE YOU TO NOD OFF OR FALL ASLEEP WHILE SITTING QUIETLY AFTER LUNCH WITHOUT ALCOHOL: WOULD NEVER DOZE
AVERAGE NUMBER OF SLEEP HOURS: 6
HOW LIKELY ARE YOU TO NOD OFF OR FALL ASLEEP IN A CAR, WHILE STOPPED FOR A FEW MINUTES IN TRAFFIC: WOULD NEVER DOZE
DO YOU HAVE PROBLEMS WITH FREQUENT AWAKENINGS AT NIGHT: NO
HOW LIKELY ARE YOU TO NOD OFF OR FALL ASLEEP WHILE SITTING AND READING: SLIGHT CHANCE OF DOZING
WHAT TIME DO YOU USUALLY WAKE UP: 06:45
DO YOU HAVE DIFFICULTY WATCHING A MOVIE OR VIDEO BECAUSE YOU BECOME SLEEPY OR TIRED: YES, MODERATE
DO YOU HAVE DIFFICULTY OPERATING A MOTOR VEHICLE FOR SHORT DISTANCES (LESS THAN 100 MILES) BECAUSE YOU BECOME SLEEPY: NO
DO YOU HAVE DIFFICULTY BEING AS ACTIVE AS YOU WANT TO BE IN THE EVENING BECAUSE YOU ARE SLEEPY OR TIRED: YES, LITTLE
HOW LIKELY ARE YOU TO NOD OFF OR FALL ASLEEP WHILE WATCHING TV: SLIGHT CHANCE OF DOZING
HOW LIKELY ARE YOU TO NOD OFF OR FALL ASLEEP WHILE LYING DOWN TO REST IN THE AFTERNOON WHEN CIRCUMSTANCES PERMIT: SLIGHT CHANCE OF DOZING
NUMBER OF TIMES YOU WAKE PER NIGHT: 1
DO YOU HAVE DIFFICULTY OPERATING A MOTOR VEHICLE FOR LONG DISTANCES (GREATER THAN 100 MILES) BECAUSE YOU BECOME SLEEPY: YES, A LITTLE
HOW LIKELY ARE YOU TO NOD OFF OR FALL ASLEEP WHILE SITTING AND TALKING TO SOMEONE: WOULD NEVER DOZE
DO YOU GENERALLY HAVE DIFFICULTY REMEMBERING THINGS BECAUSE YOU ARE SLEEPY OR TIRED: YES, A LITTLE
DO YOU HAVE DIFFICULTY BEING AS ACTIVE AS YOU WANT TO BE IN THE MORNING BECAUSE YOU ARE SLEEPY OR TIRED: NO
HOW LIKELY ARE YOU TO NOD OFF OR FALL ASLEEP IN A CAR, WHILE STOPPED FOR A FEW MINUTES IN TRAFFIC: WOULD NEVER DOZE
HOW LIKELY ARE YOU TO NOD OFF OR FALL ASLEEP WHILE SITTING INACTIVE IN A PUBLIC PLACE: SLIGHT CHANCE OF DOZING
ESS TOTAL SCORE: 4
ARE YOU BOTHERED BY WAKING UP TOO EARLY AND NOT BEING ABLE TO GET BACK TO SLEEP: YES
SELECT ANY OF THE FOLLOWING BEHAVIORS OBSERVED WHILE YOU ARE ASLEEP: LOUD SNORING
HOW LIKELY ARE YOU TO NOD OFF OR FALL ASLEEP WHILE LYING DOWN TO REST IN THE AFTERNOON WHEN CIRCUMSTANCES PERMIT: SLIGHT CHANCE OF DOZING
HAS YOUR RELATIONSHIP WITH FAMILY, FRIENDS OR WORK COLLEAGUES BEEN AFFECTED BECAUSE YOU ARE SLEEPY OR TIRED: NO
DO YOU HAVE DIFFICULTY CONCENTRATING ON THE THINGS YOU DO BECAUSE YOU ARE SLEEPY OR TIRED: YES, A LITTLE
HOW LIKELY ARE YOU TO NOD OFF OR FALL ASLEEP WHILE SITTING QUIETLY AFTER LUNCH WITHOUT ALCOHOL: WOULD NEVER DOZE

## 2024-07-16 ENCOUNTER — OFFICE VISIT (OUTPATIENT)
Age: 59
End: 2024-07-16
Payer: COMMERCIAL

## 2024-07-16 VITALS
TEMPERATURE: 98 F | BODY MASS INDEX: 26.34 KG/M2 | SYSTOLIC BLOOD PRESSURE: 132 MMHG | HEART RATE: 75 BPM | DIASTOLIC BLOOD PRESSURE: 82 MMHG | OXYGEN SATURATION: 97 % | HEIGHT: 70 IN | WEIGHT: 184 LBS

## 2024-07-16 DIAGNOSIS — G47.33 OBSTRUCTIVE SLEEP APNEA (ADULT) (PEDIATRIC): Primary | ICD-10-CM

## 2024-07-16 DIAGNOSIS — I10 ESSENTIAL HYPERTENSION: ICD-10-CM

## 2024-07-16 PROCEDURE — 3079F DIAST BP 80-89 MM HG: CPT | Performed by: INTERNAL MEDICINE

## 2024-07-16 PROCEDURE — 99204 OFFICE O/P NEW MOD 45 MIN: CPT | Performed by: INTERNAL MEDICINE

## 2024-07-16 PROCEDURE — 3075F SYST BP GE 130 - 139MM HG: CPT | Performed by: INTERNAL MEDICINE

## 2024-07-16 NOTE — PROGRESS NOTES
daily, Disp: 90 tablet, Rfl: 0    benazepril (LOTENSIN) 40 MG tablet, , Disp: , Rfl:     Ascorbic Acid (VITAMIN C) 100 MG tablet, Take 5 tablets by mouth, Disp: , Rfl:     vitamin D3 (CHOLECALCIFEROL) 10 MCG (400 UNIT) TABS tablet, Take by mouth daily, Disp: , Rfl:     Multiple Vitamins-Minerals (MULTIVITAMIN ADULT EXTRA C PO), Take 1 tablet by mouth daily, Disp: , Rfl:     metoprolol succinate (TOPROL XL) 25 MG extended release tablet, Take 1 tablet by mouth daily, Disp: , Rfl:     atorvastatin (LIPITOR) 10 MG tablet, TAKE 1 TABLET ONCE DAILY   FOR CHOLESTEROL, Disp: , Rfl:     cetirizine (ZYRTEC) 10 MG tablet, Take 1 tablet by mouth daily as needed, Disp: , Rfl:     ibuprofen (ADVIL;MOTRIN) 200 MG tablet, Take 1 tablet by mouth as needed, Disp: , Rfl:     ipratropium (ATROVENT) 0.06 % nasal spray, 1 spray by Nasal route as needed, Disp: , Rfl:      He  has a past medical history of Allergic rhinitis, Atrial fibrillation (HCC), Family history of colon cancer, Family history of prostate cancer, Heart palpitations, Hyperlipidemia, Hypertension, Low back pain, Lumbar back pain, and Murmur.    He  has a past surgical history that includes Colonoscopy (2008); Colonoscopy (6/12/15); and Colonoscopy (05/14/2021).    He family history includes Cancer in his father, mother, sister, and sister; Diabetes in his mother; Other in his mother; Stroke in his paternal aunt.    He  reports that he quit smoking about 22 years ago. His smoking use included cigarettes. He started smoking about 25 years ago. He has a 0.7 pack-year smoking history. He quit smokeless tobacco use about 28 years ago. He reports current alcohol use. He reports that he does not use drugs.     Review of Systems:  Constitutional:  No significant weight loss or weight gain.  Eyes:  No blurred vision.  CVS:  No significant chest pain  Pulm:  No significant shortness of breath  GI:  No significant nausea or vomiting  :  No significant nocturia  Musculoskeletal:

## 2024-07-19 ENCOUNTER — TELEPHONE (OUTPATIENT)
Facility: CLINIC | Age: 59
End: 2024-07-19

## 2024-07-19 ENCOUNTER — E-VISIT (OUTPATIENT)
Facility: CLINIC | Age: 59
End: 2024-07-19
Payer: COMMERCIAL

## 2024-07-19 ENCOUNTER — PATIENT MESSAGE (OUTPATIENT)
Facility: CLINIC | Age: 59
End: 2024-07-19

## 2024-07-19 DIAGNOSIS — U07.1 COVID-19: Primary | ICD-10-CM

## 2024-07-19 PROCEDURE — 99422 OL DIG E/M SVC 11-20 MIN: CPT | Performed by: INTERNAL MEDICINE

## 2024-07-19 ASSESSMENT — LIFESTYLE VARIABLES
SMOKING_STATUS: NO, BUT I USED TO SMOKE
PACKS_PER_DAY: .25
SMOKING_YEARS: 2

## 2024-07-20 NOTE — TELEPHONE ENCOUNTER
Patient called on call line asking for PAXLOVID for COVID positive x 1730 tonight. Reports minimal symptoms of mild congestion, \"like allergies\". Advised he should consider holding on paxlovid due to mild symptoms. Advised I would call rx in to his request [facility] for  tomorrow if sx's worsen. Appears PCP had sent in previously to [facility] earlier today. Will hold on sending in.

## 2024-07-20 NOTE — PROGRESS NOTES
Bryan Carrillo (:  1965) is a 58 y.o. male,Established patient, here for evaluation of the following chief complaint(s):  COVID symptoms      Assessment & Plan   1. COVID-19  He qualifies for Paxlovid because he has mild to moderate COVID symptoms, is >17 yo, is within 72 hr period of symptom onset, and has risk factors for progression to severe COVID (HTN, overweight).  -     nirmatrelvir/ritonavir 300/100 (PAXLOVID, 300/100,) 20 x 150 MG & 10 x 100MG TBPK; Take 3 tablets (two 150 mg nirmatrelvir and one 100 mg ritonavir tablets) by mouth every 12 hours for 5 days., Disp-30 tablet, R-0Normal      Follow up as scheduled on 24 for HTN.       Subjective   HPI  Patient complains of feeling ill for past 2 days with sore throat, body aches, cough, and sneezing. He was exposed to someone who tested positive for COVID. From a separate My Chart in response to my inquiry, he reports testing positive for COVID by home test today at 5:30 pm and was hoping to get Paxlovid because it worked well for him in the past. Has been vaccinated for COVID but our records do not indicate that he had the latest booster vaccine.    Review of Systems  See E-visit responses    Objective   Physical Exam       On this date 2024 I have spent 20 minutes reviewing previous notes, test results and face to face with the patient discussing the diagnosis and importance of compliance with the treatment plan as well as documenting on the day of the visit.      An electronic signature was used to authenticate this note.    --Lindsay Ríos MD

## 2024-09-17 ENCOUNTER — OFFICE VISIT (OUTPATIENT)
Facility: CLINIC | Age: 59
End: 2024-09-17
Payer: COMMERCIAL

## 2024-09-17 VITALS
BODY MASS INDEX: 26.8 KG/M2 | HEART RATE: 69 BPM | WEIGHT: 187.2 LBS | SYSTOLIC BLOOD PRESSURE: 146 MMHG | DIASTOLIC BLOOD PRESSURE: 91 MMHG | TEMPERATURE: 97.5 F | RESPIRATION RATE: 16 BRPM | OXYGEN SATURATION: 98 % | HEIGHT: 70 IN

## 2024-09-17 DIAGNOSIS — E78.2 MIXED HYPERLIPIDEMIA: ICD-10-CM

## 2024-09-17 DIAGNOSIS — Z13.1 SCREENING FOR DIABETES MELLITUS: ICD-10-CM

## 2024-09-17 DIAGNOSIS — Z12.5 SCREENING FOR PROSTATE CANCER: ICD-10-CM

## 2024-09-17 DIAGNOSIS — I10 ESSENTIAL HYPERTENSION: Primary | ICD-10-CM

## 2024-09-17 PROCEDURE — 99213 OFFICE O/P EST LOW 20 MIN: CPT | Performed by: INTERNAL MEDICINE

## 2024-09-17 PROCEDURE — 3077F SYST BP >= 140 MM HG: CPT | Performed by: INTERNAL MEDICINE

## 2024-09-17 PROCEDURE — 3080F DIAST BP >= 90 MM HG: CPT | Performed by: INTERNAL MEDICINE

## 2024-09-17 SDOH — ECONOMIC STABILITY: INCOME INSECURITY: HOW HARD IS IT FOR YOU TO PAY FOR THE VERY BASICS LIKE FOOD, HOUSING, MEDICAL CARE, AND HEATING?: NOT HARD AT ALL

## 2024-09-17 SDOH — ECONOMIC STABILITY: FOOD INSECURITY: WITHIN THE PAST 12 MONTHS, YOU WORRIED THAT YOUR FOOD WOULD RUN OUT BEFORE YOU GOT MONEY TO BUY MORE.: NEVER TRUE

## 2024-09-17 SDOH — ECONOMIC STABILITY: FOOD INSECURITY: WITHIN THE PAST 12 MONTHS, THE FOOD YOU BOUGHT JUST DIDN'T LAST AND YOU DIDN'T HAVE MONEY TO GET MORE.: NEVER TRUE

## 2024-09-20 ENCOUNTER — PROCEDURE VISIT (OUTPATIENT)
Age: 59
End: 2024-09-20

## 2024-09-20 DIAGNOSIS — G47.33 OBSTRUCTIVE SLEEP APNEA (ADULT) (PEDIATRIC): Primary | ICD-10-CM

## 2024-09-22 ENCOUNTER — HOSPITAL ENCOUNTER (OUTPATIENT)
Facility: HOSPITAL | Age: 59
Discharge: HOME | End: 2024-09-25
Payer: COMMERCIAL

## 2024-09-22 PROCEDURE — 95800 SLP STDY UNATTENDED: CPT | Performed by: INTERNAL MEDICINE

## 2024-09-25 ENCOUNTER — CLINICAL DOCUMENTATION (OUTPATIENT)
Age: 59
End: 2024-09-25

## 2024-09-25 DIAGNOSIS — G47.33 OBSTRUCTIVE SLEEP APNEA (ADULT) (PEDIATRIC): Primary | ICD-10-CM

## 2024-09-26 ENCOUNTER — PATIENT MESSAGE (OUTPATIENT)
Facility: CLINIC | Age: 59
End: 2024-09-26

## 2024-09-26 ENCOUNTER — CLINICAL DOCUMENTATION (OUTPATIENT)
Age: 59
End: 2024-09-26

## 2024-09-30 ENCOUNTER — TELEPHONE (OUTPATIENT)
Age: 59
End: 2024-09-30

## 2024-10-20 DIAGNOSIS — I10 ESSENTIAL HYPERTENSION: ICD-10-CM

## 2024-10-21 RX ORDER — AMLODIPINE BESYLATE 10 MG/1
10 TABLET ORAL DAILY
Qty: 90 TABLET | Refills: 2 | Status: SHIPPED | OUTPATIENT
Start: 2024-10-21

## 2024-10-21 NOTE — TELEPHONE ENCOUNTER
Future Appointments:  Future Appointments   Date Time Provider Department Center   1/21/2025  9:30 AM Lindsay Ríos MD [facility]   2/4/2025  3:20 PM Mona Sykes MD [facility] BS [facility]        Last Appointment With Me:  9/17/2024     Requested Prescriptions     Pending Prescriptions Disp Refills    amLODIPine (NORVASC) 10 MG tablet 90 tablet 0     Sig: Take 1 tablet by mouth daily

## 2025-01-21 ENCOUNTER — OFFICE VISIT (OUTPATIENT)
Facility: CLINIC | Age: 60
End: 2025-01-21

## 2025-01-21 VITALS
WEIGHT: 187.6 LBS | DIASTOLIC BLOOD PRESSURE: 80 MMHG | HEART RATE: 86 BPM | RESPIRATION RATE: 16 BRPM | TEMPERATURE: 98 F | BODY MASS INDEX: 26.86 KG/M2 | OXYGEN SATURATION: 100 % | SYSTOLIC BLOOD PRESSURE: 130 MMHG | HEIGHT: 70 IN

## 2025-01-21 DIAGNOSIS — Z12.5 SCREENING FOR PROSTATE CANCER: ICD-10-CM

## 2025-01-21 DIAGNOSIS — I87.2 VENOUS INSUFFICIENCY (CHRONIC) (PERIPHERAL): ICD-10-CM

## 2025-01-21 DIAGNOSIS — G89.29 CHRONIC PAIN OF RIGHT KNEE: ICD-10-CM

## 2025-01-21 DIAGNOSIS — E78.2 MIXED HYPERLIPIDEMIA: ICD-10-CM

## 2025-01-21 DIAGNOSIS — Z00.00 ENCOUNTER FOR WELL ADULT EXAM WITHOUT ABNORMAL FINDINGS: Primary | ICD-10-CM

## 2025-01-21 DIAGNOSIS — H61.22 IMPACTED CERUMEN OF LEFT EAR: ICD-10-CM

## 2025-01-21 DIAGNOSIS — M25.561 CHRONIC PAIN OF RIGHT KNEE: ICD-10-CM

## 2025-01-21 DIAGNOSIS — M25.611 SHOULDER JOINT STIFFNESS, RIGHT: ICD-10-CM

## 2025-01-21 DIAGNOSIS — I10 ESSENTIAL HYPERTENSION: ICD-10-CM

## 2025-01-21 DIAGNOSIS — R73.03 PREDIABETES: ICD-10-CM

## 2025-01-21 DIAGNOSIS — G47.33 OSA ON CPAP: ICD-10-CM

## 2025-01-21 DIAGNOSIS — Z13.1 SCREENING FOR DIABETES MELLITUS: ICD-10-CM

## 2025-01-21 SDOH — ECONOMIC STABILITY: FOOD INSECURITY: WITHIN THE PAST 12 MONTHS, YOU WORRIED THAT YOUR FOOD WOULD RUN OUT BEFORE YOU GOT MONEY TO BUY MORE.: NEVER TRUE

## 2025-01-21 SDOH — ECONOMIC STABILITY: FOOD INSECURITY: WITHIN THE PAST 12 MONTHS, THE FOOD YOU BOUGHT JUST DIDN'T LAST AND YOU DIDN'T HAVE MONEY TO GET MORE.: NEVER TRUE

## 2025-01-21 ASSESSMENT — PATIENT HEALTH QUESTIONNAIRE - PHQ9
2. FEELING DOWN, DEPRESSED OR HOPELESS: NOT AT ALL
SUM OF ALL RESPONSES TO PHQ QUESTIONS 1-9: 0
SUM OF ALL RESPONSES TO PHQ QUESTIONS 1-9: 0
SUM OF ALL RESPONSES TO PHQ9 QUESTIONS 1 & 2: 0
SUM OF ALL RESPONSES TO PHQ QUESTIONS 1-9: 0
1. LITTLE INTEREST OR PLEASURE IN DOING THINGS: NOT AT ALL
SUM OF ALL RESPONSES TO PHQ QUESTIONS 1-9: 0

## 2025-01-21 NOTE — PATIENT INSTRUCTIONS
hands, brush your teeth twice a day, and wear a seat belt in the car.   Where can you learn more?  Go to https://www.[employer].net/patientEd and enter P072 to learn more about \"Well Visit, Ages 18 to 65: Care Instructions.\"  Current as of: April 30, 2024  Content Version: 14.3  © 2024 [employer].   Care instructions adapted under license by [employer]. If you have questions about a medical condition or this instruction, always ask your healthcare professional. [employer] disclaims any warranty or liability for your use of this information.

## 2025-01-21 NOTE — PROGRESS NOTES
Bryan aCrrillo  Identified pt with two pt identifiers(name and ).  Chief Complaint   Patient presents with    Annual Exam       1. Have you been to the ER, urgent care clinic since your last visit?  Hospitalized since your last visit? NO    2. Have you seen or consulted any other health care providers outside of the [facility] since your last visit?  Include any pap smears or colon screening. NO      Provider notified of reason for visit, vitals and flowsheets obtained on patients.     Patient received paperwork for advance directive during previous visit but has not completed at this time     Reviewed record In preparation for visit, huddled with provider and have obtained necessary documentation      Health Maintenance Due   Topic    Diabetes screen     Lipids        Wt Readings from Last 3 Encounters:   25 85.1 kg (187 lb 9.6 oz)   24 84.9 kg (187 lb 3.2 oz)   24 83.5 kg (184 lb)     Temp Readings from Last 3 Encounters:   25 98 °F (36.7 °C) (Oral)   24 97.5 °F (36.4 °C) (Oral)   24 98 °F (36.7 °C)     BP Readings from Last 3 Encounters:   25 130/80   24 (!) 146/91   24 132/82     Pulse Readings from Last 3 Encounters:   25 86   24 69   24 75          No data to display                  Learning Assessment:  :         10/17/2023     9:10 AM   [facility] AMB LEARNING ASSESSMENT   Primary Learner Patient   Primary Language ENGLISH   Learning Preference DEMONSTRATION    READING   Answered By Patient   Relationship to Learner SELF       Fall Risk Assessment:  :         2022     8:00 AM   Amb Fall Risk Assessment and TUG Test   Fall in past 12 months? 0   Able to walk? Yes       Abuse Screening:  :          No data to display                ADL Screening:  :         10/17/2023     9:00 AM   ADL ASSESSMENT   Feeding yourself No Help Needed   Getting from bed to chair No Help Needed   Getting dressed No Help Needed   Bathing or 
05/15/2023        Health Maintenance Due   Topic Date Due    Diabetes screen  09/16/2023    Lipids  10/17/2024      Recommendations for Preventive Services Due: see orders and patient instructions/AVS.    The patient (or guardian, if applicable) and other individuals in attendance with the patient were advised that Artificial Intelligence will be utilized during this visit to record and process the conversation to generate a clinical note. The patient (or guardian, if applicable) and other individuals in attendance at the appointment consented to the use of AI, including the recording.

## 2025-01-22 LAB
ALBUMIN SERPL-MCNC: 4.4 G/DL (ref 3.5–5)
ALBUMIN/GLOB SERPL: 1.3 (ref 1.1–2.2)
ALP SERPL-CCNC: 77 U/L (ref 45–117)
ALT SERPL-CCNC: 30 U/L (ref 12–78)
ANION GAP SERPL CALC-SCNC: 6 MMOL/L (ref 2–12)
AST SERPL-CCNC: 22 U/L (ref 15–37)
BASOPHILS # BLD: 0.04 K/UL (ref 0–0.1)
BASOPHILS NFR BLD: 0.8 % (ref 0–1)
BILIRUB SERPL-MCNC: 0.6 MG/DL (ref 0.2–1)
BUN SERPL-MCNC: 19 MG/DL (ref 6–20)
BUN/CREAT SERPL: 16 (ref 12–20)
CALCIUM SERPL-MCNC: 10.3 MG/DL (ref 8.5–10.1)
CHLORIDE SERPL-SCNC: 104 MMOL/L (ref 97–108)
CHOLEST SERPL-MCNC: 226 MG/DL
CO2 SERPL-SCNC: 29 MMOL/L (ref 21–32)
CREAT SERPL-MCNC: 1.2 MG/DL (ref 0.7–1.3)
DIFFERENTIAL METHOD BLD: ABNORMAL
EOSINOPHIL # BLD: 0.04 K/UL (ref 0–0.4)
EOSINOPHIL NFR BLD: 0.8 % (ref 0–7)
ERYTHROCYTE [DISTWIDTH] IN BLOOD BY AUTOMATED COUNT: 13.2 % (ref 11.5–14.5)
EST. AVERAGE GLUCOSE BLD GHB EST-MCNC: 117 MG/DL
GLOBULIN SER CALC-MCNC: 3.3 G/DL (ref 2–4)
GLUCOSE SERPL-MCNC: 92 MG/DL (ref 65–100)
HBA1C MFR BLD: 5.7 % (ref 4–5.6)
HCT VFR BLD AUTO: 44.7 % (ref 36.6–50.3)
HDLC SERPL-MCNC: 121 MG/DL
HDLC SERPL: 1.9 (ref 0–5)
HGB BLD-MCNC: 14.2 G/DL (ref 12.1–17)
IMM GRANULOCYTES # BLD AUTO: 0.01 K/UL (ref 0–0.04)
IMM GRANULOCYTES NFR BLD AUTO: 0.2 % (ref 0–0.5)
LDLC SERPL CALC-MCNC: 91.2 MG/DL (ref 0–100)
LYMPHOCYTES # BLD: 1.46 K/UL (ref 0.8–3.5)
LYMPHOCYTES NFR BLD: 28.5 % (ref 12–49)
MCH RBC QN AUTO: 25.9 PG (ref 26–34)
MCHC RBC AUTO-ENTMCNC: 31.8 G/DL (ref 30–36.5)
MCV RBC AUTO: 81.4 FL (ref 80–99)
MONOCYTES # BLD: 0.41 K/UL (ref 0–1)
MONOCYTES NFR BLD: 8 % (ref 5–13)
NEUTS SEG # BLD: 3.17 K/UL (ref 1.8–8)
NEUTS SEG NFR BLD: 61.7 % (ref 32–75)
NRBC # BLD: 0 K/UL (ref 0–0.01)
NRBC BLD-RTO: 0 PER 100 WBC
PLATELET # BLD AUTO: 306 K/UL (ref 150–400)
PMV BLD AUTO: 10.3 FL (ref 8.9–12.9)
POTASSIUM SERPL-SCNC: 5.4 MMOL/L (ref 3.5–5.1)
PROT SERPL-MCNC: 7.7 G/DL (ref 6.4–8.2)
PSA SERPL-MCNC: 2.1 NG/ML (ref 0.01–4)
RBC # BLD AUTO: 5.49 M/UL (ref 4.1–5.7)
SODIUM SERPL-SCNC: 139 MMOL/L (ref 136–145)
TRIGL SERPL-MCNC: 69 MG/DL
TSH SERPL DL<=0.05 MIU/L-ACNC: 0.6 UIU/ML (ref 0.36–3.74)
VLDLC SERPL CALC-MCNC: 13.8 MG/DL
WBC # BLD AUTO: 5.1 K/UL (ref 4.1–11.1)

## 2025-01-25 ENCOUNTER — PATIENT MESSAGE (OUTPATIENT)
Facility: CLINIC | Age: 60
End: 2025-01-25

## 2025-01-25 DIAGNOSIS — I10 ESSENTIAL HYPERTENSION: Primary | ICD-10-CM

## 2025-01-25 DIAGNOSIS — E87.5 HYPERKALEMIA: Primary | ICD-10-CM

## 2025-01-25 DIAGNOSIS — E55.9 VITAMIN D DEFICIENCY: ICD-10-CM

## 2025-01-29 RX ORDER — BENAZEPRIL HYDROCHLORIDE 20 MG/1
30 TABLET ORAL DAILY
Qty: 45 TABLET | Refills: 1 | Status: SHIPPED | OUTPATIENT
Start: 2025-01-29 | End: 2025-01-31

## 2025-01-31 RX ORDER — BENAZEPRIL HYDROCHLORIDE 20 MG/1
30 TABLET ORAL DAILY
Qty: 135 TABLET | Refills: 1 | Status: SHIPPED | OUTPATIENT
Start: 2025-01-31

## 2025-02-07 ENCOUNTER — TELEMEDICINE (OUTPATIENT)
Age: 60
End: 2025-02-07
Payer: COMMERCIAL

## 2025-02-07 DIAGNOSIS — G47.33 OBSTRUCTIVE SLEEP APNEA (ADULT) (PEDIATRIC): Primary | ICD-10-CM

## 2025-02-07 DIAGNOSIS — I10 ESSENTIAL HYPERTENSION: ICD-10-CM

## 2025-02-07 PROCEDURE — 99214 OFFICE O/P EST MOD 30 MIN: CPT | Performed by: INTERNAL MEDICINE

## 2025-02-07 ASSESSMENT — SLEEP AND FATIGUE QUESTIONNAIRES
ESS TOTAL SCORE: 11
HOW LIKELY ARE YOU TO NOD OFF OR FALL ASLEEP WHILE WATCHING TV: MODERATE CHANCE OF DOZING
HOW LIKELY ARE YOU TO NOD OFF OR FALL ASLEEP WHILE SITTING AND TALKING TO SOMEONE: SLIGHT CHANCE OF DOZING
HOW LIKELY ARE YOU TO NOD OFF OR FALL ASLEEP WHEN YOU ARE A PASSENGER IN A CAR FOR AN HOUR WITHOUT A BREAK: SLIGHT CHANCE OF DOZING
HOW LIKELY ARE YOU TO NOD OFF OR FALL ASLEEP IN A CAR, WHILE STOPPED FOR A FEW MINUTES IN TRAFFIC: SLIGHT CHANCE OF DOZING
HOW LIKELY ARE YOU TO NOD OFF OR FALL ASLEEP WHILE SITTING INACTIVE IN A PUBLIC PLACE: SLIGHT CHANCE OF DOZING
HOW LIKELY ARE YOU TO NOD OFF OR FALL ASLEEP WHILE LYING DOWN TO REST IN THE AFTERNOON WHEN CIRCUMSTANCES PERMIT: MODERATE CHANCE OF DOZING
HOW LIKELY ARE YOU TO NOD OFF OR FALL ASLEEP WHILE SITTING QUIETLY AFTER LUNCH WITHOUT ALCOHOL: SLIGHT CHANCE OF DOZING
HOW LIKELY ARE YOU TO NOD OFF OR FALL ASLEEP WHILE SITTING AND READING: MODERATE CHANCE OF DOZING

## 2025-02-07 NOTE — PATIENT INSTRUCTIONS
[address]  [place] [address]  Tel.  [phone]  Fax. [phone] [address]  [place] [address]  Tel.  [phone]  Fax. [phone] [address] [place] [address]  Tel.  [phone]  Fax. [phone]     PROPER SLEEP HYGIENE    What to avoid  Do not have drinks with caffeine, such as coffee or black tea, for 8 hours before bed.  Do not smoke or use other types of tobacco near bedtime. Nicotine is a stimulant and can keep you awake.  Avoid drinking alcohol late in the evening, because it can cause you to wake in the middle of the night.  Do not eat a big meal close to bedtime. If you are hungry, eat a light snack.  Do not drink a lot of water close to bedtime, because the need to urinate may wake you up during the night.  Do not read or watch TV in bed. Use the bed only for sleeping and sexual activity.  What to try  Go to bed at the same time every night, and wake up at the same time every morning. Do not take naps during the day.  Keep your bedroom quiet, dark, and cool.  Get regular exercise, but not within 3 to 4 hours of your bedtime..  Sleep on a comfortable pillow and mattress.  If watching the clock makes you anxious, turn it facing away from you so you cannot see the time.  If you worry when you lie down, start a worry book. Well before bedtime, write down your worries, and then set the book and your concerns aside.  Try meditation or other relaxation techniques before you go to bed.  If you cannot fall asleep, get up and go to another room until you feel sleepy. Do something relaxing. Repeat your bedtime routine before you go to bed again.  Make your house quiet and calm about an hour before bedtime. Turn down the lights, turn off the TV, log off the computer, and turn down the volume on music. This can help you relax after a busy day.    Drowsy Driving  The [place] National Highway Traffic Safety Administration cites drowsiness as a

## 2025-02-07 NOTE — PROGRESS NOTES
Bryan Carrillo, was evaluated through a synchronous (real-time) audio-video encounter. The patient (or guardian if applicable) is aware that this is a billable service, which includes applicable co-pays. This Virtual Visit was conducted with patient's (and/or legal guardian's) consent. Patient identification was verified, and a caregiver was present when appropriate.   The patient was located at Home: [address] [place] [id number]  Provider was located at Facility (Appt Dept): [address]  [place] [address]  Confirm you are appropriately licensed, registered, or certified to deliver care in the state where the patient is located as indicated above. If you are not or unsure, please re-schedule the visit: Yes, I confirm.      Total time spent for this encounter: Not billed by time    --Mona Sykes MD on 2/7/2025 at 4:48 PM    An electronic signature was used to authenticate this note.'                    [address]  [place] [address]  Tel.  [phone]  Fax. [phone] [address]  [place] [address]  Tel.  [phone]  Fax. [phone] [address] [place] [address].  [place] [address]  Tel.  [phone]  Fax. [phone]       Telemedicine visit performed with verbal consent of the patient.  Patient called and identity confirmed with 2 patient identifers          S>Bryan Carrillo is a 59 y.o. male seen at this telemedicine visit for a positive airway pressure follow-up.  He reports no problems using the device.  He is   compliant over the past 30 days.  The following problems are identified:    Drowsiness At times-he stays up late Problems exhaling no   Snoring no Forget to put on no   Mask Comfortable yes Can't fall asleep no   Dry Mouth no Mask falls off no   Air Leaking no Frequent awakenings no       Download reviewed.  Estimated AHI flow from download shows 1/hour.   no significant leak  Averaging 5.5/hours use on nights used  Days

## 2025-06-03 DIAGNOSIS — I10 ESSENTIAL HYPERTENSION: ICD-10-CM

## 2025-06-03 RX ORDER — AMLODIPINE BESYLATE 10 MG/1
10 TABLET ORAL DAILY
Qty: 90 TABLET | Refills: 3 | Status: SHIPPED | OUTPATIENT
Start: 2025-06-03

## 2025-06-03 NOTE — TELEPHONE ENCOUNTER
PCP: Lindsay Ríos MD     Last appt:  1/21/2025      Future Appointments   Date Time Provider Department Center   8/6/2025  9:50 AM Lindsay Ríos MD [facility] DEP          Requested Prescriptions     Pending Prescriptions Disp Refills    amLODIPine (NORVASC) 10 MG tablet 90 tablet 2     Sig: Take 1 tablet by mouth daily

## 2025-08-06 ENCOUNTER — OFFICE VISIT (OUTPATIENT)
Facility: CLINIC | Age: 60
End: 2025-08-06
Payer: COMMERCIAL

## 2025-08-06 VITALS
OXYGEN SATURATION: 99 % | RESPIRATION RATE: 16 BRPM | HEART RATE: 72 BPM | BODY MASS INDEX: 26.37 KG/M2 | HEIGHT: 70 IN | WEIGHT: 184.2 LBS | DIASTOLIC BLOOD PRESSURE: 82 MMHG | TEMPERATURE: 98.2 F | SYSTOLIC BLOOD PRESSURE: 134 MMHG

## 2025-08-06 DIAGNOSIS — I10 ESSENTIAL HYPERTENSION: Primary | ICD-10-CM

## 2025-08-06 DIAGNOSIS — E83.52 HYPERCALCEMIA: ICD-10-CM

## 2025-08-06 DIAGNOSIS — Z12.5 SCREENING FOR PROSTATE CANCER: ICD-10-CM

## 2025-08-06 DIAGNOSIS — E78.2 MIXED HYPERLIPIDEMIA: ICD-10-CM

## 2025-08-06 DIAGNOSIS — E55.9 VITAMIN D DEFICIENCY: ICD-10-CM

## 2025-08-06 DIAGNOSIS — R73.03 PREDIABETES: ICD-10-CM

## 2025-08-06 DIAGNOSIS — M17.11 PRIMARY OSTEOARTHRITIS OF RIGHT KNEE: ICD-10-CM

## 2025-08-06 PROCEDURE — 3079F DIAST BP 80-89 MM HG: CPT | Performed by: INTERNAL MEDICINE

## 2025-08-06 PROCEDURE — 3075F SYST BP GE 130 - 139MM HG: CPT | Performed by: INTERNAL MEDICINE

## 2025-08-06 PROCEDURE — 99214 OFFICE O/P EST MOD 30 MIN: CPT | Performed by: INTERNAL MEDICINE

## 2025-08-06 RX ORDER — BENAZEPRIL HYDROCHLORIDE 20 MG/1
30 TABLET ORAL DAILY
Qty: 135 TABLET | Refills: 1 | Status: SHIPPED | OUTPATIENT
Start: 2025-08-06

## 2025-08-07 LAB
25(OH)D3 SERPL-MCNC: 34.1 NG/ML (ref 30–100)
ALBUMIN SERPL-MCNC: 4.6 G/DL (ref 3.5–5.2)
ALBUMIN/GLOB SERPL: 1.8 (ref 1.1–2.2)
ALP SERPL-CCNC: 59 U/L (ref 40–129)
ALT SERPL-CCNC: 33 U/L (ref 10–50)
ANION GAP SERPL CALC-SCNC: 10 MMOL/L (ref 2–14)
AST SERPL-CCNC: 40 U/L (ref 10–50)
BILIRUB SERPL-MCNC: 0.5 MG/DL (ref 0–1.2)
BUN SERPL-MCNC: 14 MG/DL (ref 8–23)
BUN/CREAT SERPL: 14 (ref 12–20)
CALCIUM SERPL-MCNC: 10.3 MG/DL (ref 8.8–10.2)
CALCIUM SERPL-MCNC: 10.4 MG/DL (ref 8.8–10.2)
CHLORIDE SERPL-SCNC: 103 MMOL/L (ref 98–107)
CO2 SERPL-SCNC: 27 MMOL/L (ref 20–29)
CREAT SERPL-MCNC: 1.01 MG/DL (ref 0.7–1.2)
GLOBULIN SER CALC-MCNC: 2.6 G/DL (ref 2–4)
GLUCOSE SERPL-MCNC: 107 MG/DL (ref 65–100)
POTASSIUM SERPL-SCNC: 5.2 MMOL/L (ref 3.5–5.1)
PROT SERPL-MCNC: 7.2 G/DL (ref 6.4–8.3)
PTH-INTACT SERPL-MCNC: 46.3 PG/ML (ref 15–65)
SODIUM SERPL-SCNC: 140 MMOL/L (ref 136–145)

## 2025-08-14 ENCOUNTER — OFFICE VISIT (OUTPATIENT)
Facility: CLINIC | Age: 60
End: 2025-08-14
Payer: COMMERCIAL

## 2025-08-14 VITALS
SYSTOLIC BLOOD PRESSURE: 136 MMHG | WEIGHT: 183.4 LBS | HEIGHT: 70 IN | BODY MASS INDEX: 26.26 KG/M2 | DIASTOLIC BLOOD PRESSURE: 82 MMHG | OXYGEN SATURATION: 99 % | RESPIRATION RATE: 16 BRPM | TEMPERATURE: 98.2 F | HEART RATE: 88 BPM

## 2025-08-14 DIAGNOSIS — R19.06 EPIGASTRIC MASS: Primary | ICD-10-CM

## 2025-08-14 PROCEDURE — 3079F DIAST BP 80-89 MM HG: CPT | Performed by: INTERNAL MEDICINE

## 2025-08-14 PROCEDURE — 99213 OFFICE O/P EST LOW 20 MIN: CPT | Performed by: INTERNAL MEDICINE

## 2025-08-14 PROCEDURE — 3075F SYST BP GE 130 - 139MM HG: CPT | Performed by: INTERNAL MEDICINE

## 2025-08-19 DIAGNOSIS — I10 ESSENTIAL HYPERTENSION: ICD-10-CM

## 2025-08-19 RX ORDER — AMLODIPINE BESYLATE 10 MG/1
10 TABLET ORAL DAILY
Qty: 90 TABLET | Refills: 3 | Status: SHIPPED | OUTPATIENT
Start: 2025-08-19